# Patient Record
Sex: FEMALE | Race: WHITE | Employment: FULL TIME | URBAN - METROPOLITAN AREA
[De-identification: names, ages, dates, MRNs, and addresses within clinical notes are randomized per-mention and may not be internally consistent; named-entity substitution may affect disease eponyms.]

---

## 2016-02-09 LAB — HPV ABSTRACT: NORMAL

## 2017-05-12 LAB — TSH SERPL-ACNC: 0.98 UIU/ML (ref 0.3–4.5)

## 2017-06-07 ENCOUNTER — TRANSFERRED RECORDS (OUTPATIENT)
Dept: HEALTH INFORMATION MANAGEMENT | Facility: CLINIC | Age: 32
End: 2017-06-07

## 2017-06-07 LAB
HPV ABSTRACT: NORMAL
PAP-ABSTRACT: NORMAL

## 2018-04-09 ENCOUNTER — OFFICE VISIT (OUTPATIENT)
Dept: FAMILY MEDICINE | Facility: CLINIC | Age: 33
End: 2018-04-09
Payer: COMMERCIAL

## 2018-04-09 VITALS
RESPIRATION RATE: 16 BRPM | HEIGHT: 64 IN | DIASTOLIC BLOOD PRESSURE: 68 MMHG | HEART RATE: 68 BPM | SYSTOLIC BLOOD PRESSURE: 118 MMHG | BODY MASS INDEX: 24.59 KG/M2 | TEMPERATURE: 98.1 F | WEIGHT: 144 LBS | OXYGEN SATURATION: 99 %

## 2018-04-09 DIAGNOSIS — G43.009 MIGRAINE WITHOUT AURA AND WITHOUT STATUS MIGRAINOSUS, NOT INTRACTABLE: ICD-10-CM

## 2018-04-09 DIAGNOSIS — F33.1 MODERATE EPISODE OF RECURRENT MAJOR DEPRESSIVE DISORDER (H): Primary | ICD-10-CM

## 2018-04-09 DIAGNOSIS — F41.9 ANXIETY: ICD-10-CM

## 2018-04-09 DIAGNOSIS — G47.09 OTHER INSOMNIA: ICD-10-CM

## 2018-04-09 DIAGNOSIS — Z12.4 SCREENING FOR MALIGNANT NEOPLASM OF CERVIX: ICD-10-CM

## 2018-04-09 DIAGNOSIS — F90.9 ATTENTION DEFICIT HYPERACTIVITY DISORDER (ADHD), UNSPECIFIED ADHD TYPE: ICD-10-CM

## 2018-04-09 DIAGNOSIS — F19.21 DRUG DEPENDENCE, IN REMISSION (H): ICD-10-CM

## 2018-04-09 PROBLEM — E03.9 ACQUIRED HYPOTHYROIDISM: Status: ACTIVE | Noted: 2018-04-09

## 2018-04-09 PROCEDURE — 99203 OFFICE O/P NEW LOW 30 MIN: CPT | Performed by: FAMILY MEDICINE

## 2018-04-09 RX ORDER — RIZATRIPTAN BENZOATE 10 MG/1
TABLET, ORALLY DISINTEGRATING ORAL
Refills: 6 | COMMUNITY
Start: 2018-03-08 | End: 2019-08-19

## 2018-04-09 RX ORDER — VENLAFAXINE HYDROCHLORIDE 150 MG/1
150 CAPSULE, EXTENDED RELEASE ORAL DAILY
Refills: 3 | COMMUNITY
Start: 2018-03-27 | End: 2018-05-22

## 2018-04-09 RX ORDER — VENLAFAXINE HYDROCHLORIDE 75 MG/1
75 CAPSULE, EXTENDED RELEASE ORAL DAILY
Qty: 30 CAPSULE | Refills: 0 | Status: SHIPPED | OUTPATIENT
Start: 2018-04-09 | End: 2018-05-22

## 2018-04-09 RX ORDER — QUETIAPINE FUMARATE 25 MG/1
25-50 TABLET, FILM COATED ORAL
Qty: 45 TABLET | Refills: 0 | Status: SHIPPED | OUTPATIENT
Start: 2018-04-09 | End: 2018-05-13

## 2018-04-09 RX ORDER — ONDANSETRON 4 MG/1
TABLET, ORALLY DISINTEGRATING ORAL
Refills: 1 | COMMUNITY
Start: 2018-03-08 | End: 2019-10-30

## 2018-04-09 RX ORDER — BUPROPION HYDROCHLORIDE 150 MG/1
150 TABLET ORAL EVERY MORNING
Qty: 30 TABLET | Refills: 1 | Status: SHIPPED | OUTPATIENT
Start: 2018-04-09 | End: 2018-05-22

## 2018-04-09 ASSESSMENT — PATIENT HEALTH QUESTIONNAIRE - PHQ9: 5. POOR APPETITE OR OVEREATING: NEARLY EVERY DAY

## 2018-04-09 ASSESSMENT — ANXIETY QUESTIONNAIRES
5. BEING SO RESTLESS THAT IT IS HARD TO SIT STILL: SEVERAL DAYS
1. FEELING NERVOUS, ANXIOUS, OR ON EDGE: NEARLY EVERY DAY
7. FEELING AFRAID AS IF SOMETHING AWFUL MIGHT HAPPEN: SEVERAL DAYS
IF YOU CHECKED OFF ANY PROBLEMS ON THIS QUESTIONNAIRE, HOW DIFFICULT HAVE THESE PROBLEMS MADE IT FOR YOU TO DO YOUR WORK, TAKE CARE OF THINGS AT HOME, OR GET ALONG WITH OTHER PEOPLE: EXTREMELY DIFFICULT
3. WORRYING TOO MUCH ABOUT DIFFERENT THINGS: MORE THAN HALF THE DAYS
6. BECOMING EASILY ANNOYED OR IRRITABLE: MORE THAN HALF THE DAYS
GAD7 TOTAL SCORE: 14
2. NOT BEING ABLE TO STOP OR CONTROL WORRYING: MORE THAN HALF THE DAYS

## 2018-04-09 NOTE — MR AVS SNAPSHOT
After Visit Summary   4/9/2018    Inessa Ayala    MRN: [id number]           Patient Information     Date Of Birth          1985        Visit Information        Provider Department      4/9/2018 1:40 PM Maria Guadalupe Thornton MD [facility]        Today's Diagnoses     Screening for malignant neoplasm of cervix    -  1    Migraine without aura and without status migrainosus, not intractable        Moderate episode of recurrent major depressive disorder (H)        Anxiety          Care Instructions    Decrease Effexor: switch between 150 mg and 75 mg every other day. After 1-2 weeks decrease to 75 mg daily. After another 1-2 weeks add wellbutrin    Be aware you may not see mood changes until about 2-3 weeks after decreasing medication.     Start Wellbutrin 150 mg once daily in the morning- after you have been taking Effexor to 75 mg daily for a couple weeks.      Take Seroquel before bed.    Follow up with psychiatry and counseling.     Med check with me in 3-4 weeks.    Initiate supplemental oral Vitamin D, 2000 IUs daily.            Follow-ups after your visit        Additional Services     MENTAL HEALTH REFERRAL  - Adult; Outpatient Treatment; Individual/Couples/Family/Group Therapy/Health Psychology; [place]: [facility] [phone]; We will contact you to schedule the appointment or please call with any questions       All scheduling is subject to the client's specific insurance plan & benefits, provider/location availability, and provider clinical specialities.  Please arrive 15 minutes early for your first appointment and bring your completed paperwork.    Please be aware that coverage of these services is subject to the terms and limitations of your health insurance plan.  Call member services at your health plan with any benefit or coverage questions.                      MENTAL HEALTH REFERRAL  - Adult; Psychiatry and Medication Management;  "Psychiatry; [facility]: Psychiatry Clinic [phone]; We will contact you to schedule the appointment or please call with any questions       All scheduling is subject to the client's specific insurance plan & benefits, provider/location availability, and provider clinical specialities.  Please arrive 15 minutes early for your first appointment and bring your completed paperwork.    Please be aware that coverage of these services is subject to the terms and limitations of your health insurance plan.  Call member services at your health plan with any benefit or coverage questions.                            Who to contact     If you have questions or need follow up information about today's clinic visit or your schedule please contact [facility] directly at [phone].  Normal or non-critical lab and imaging results will be communicated to you by BRAINDIGIThart, letter or phone within 4 business days after the clinic has received the results. If you do not hear from us within 7 days, please contact the clinic through BRAINDIGIThart or phone. If you have a critical or abnormal lab result, we will notify you by phone as soon as possible.  Submit refill requests through [employer] or call your pharmacy and they will forward the refill request to us. Please allow 3 business days for your refill to be completed.          Additional Information About Your Visit        BRAINDIGIThart Information     [employer] lets you send messages to your doctor, view your test results, renew your prescriptions, schedule appointments and more. To sign up, go to [email or web address] . Click on \"Log in\" on the left side of the screen, which will take you to the Welcome page. Then click on \"Sign up Now\" on the right side of the page.     You will be asked to enter the access code listed below, as well as some personal information. Please follow the directions to create your username and password.     Your access code is: [email or web address]  Expires: " "2018  2:37 PM     Your access code will  in 90 days. If you need help or a new code, please call your [place] clinic or [phone].        Care EveryWhere ID     This is your Care EveryWhere ID. This could be used by other organizations to access your [place] medical records  [id number]        Your Vitals Were     Pulse Temperature Respirations Height Last Period Pulse Oximetry    68 98.1  F (36.7  C) (Oral) 16 1.615 m (5' 3.58\") 03/15/2018 99%    BMI (Body Mass Index)                   25.04 kg/m2            Blood Pressure from Last 3 Encounters:   18 118/68    Weight from Last 3 Encounters:   18 65.3 kg (144 lb)              We Performed the Following     MENTAL HEALTH REFERRAL  - Adult; Outpatient Treatment; Individual/Couples/Family/Group Therapy/Health Psychology; [place]: [facility] [phone]; We will contact you to schedule the appointment or please call with any questions     MENTAL HEALTH REFERRAL  - Adult; Psychiatry and Medication Management; Psychiatry; [facility] [place]: Psychiatry Clinic [phone]; We will contact you to schedule the appointment or please call with any questions          Today's Medication Changes          These changes are accurate as of 18  2:37 PM.  If you have any questions, ask your nurse or doctor.               Start taking these medicines.        Dose/Directions    buPROPion 150 MG 24 hr tablet   Commonly known as:  WELLBUTRIN XL   Used for:  Moderate episode of recurrent major depressive disorder (H), Anxiety   Started by:  Maria Guadalupe Thornton MD        Dose:  150 mg   Take 1 tablet (150 mg) by mouth every morning   Quantity:  30 tablet   Refills:  1       QUEtiapine 25 MG tablet   Commonly known as:  SEROQUEL   Used for:  Moderate episode of recurrent major depressive disorder (H), Anxiety   Started by:  Maria Guadalupe Thornton MD        Dose:  25-50 mg   Take 1-2 tablets (25-50 mg) by mouth nightly as needed   Quantity:  " 45 tablet   Refills:  0         These medicines have changed or have updated prescriptions.        Dose/Directions    * venlafaxine 150 MG 24 hr capsule   Commonly known as:  EFFEXOR-XR   This may have changed:  Another medication with the same name was added. Make sure you understand how and when to take each.   Changed by:  Maria Guadalupe Thornton MD        Dose:  150 mg   Take 150 mg by mouth daily   Refills:  3       * venlafaxine 75 MG 24 hr capsule   Commonly known as:  EFFEXOR-XR   This may have changed:  You were already taking a medication with the same name, and this prescription was added. Make sure you understand how and when to take each.   Used for:  Moderate episode of recurrent major depressive disorder (H), Anxiety   Changed by:  Maria Guadalupe Thornton MD        Dose:  75 mg   Take 1 capsule (75 mg) by mouth daily   Quantity:  30 capsule   Refills:  0       * Notice:  This list has 2 medication(s) that are the same as other medications prescribed for you. Read the directions carefully, and ask your doctor or other care provider to review them with you.         Where to get your medicines      These medications were sent to [id number] - [facility] [address] [facility], [place] AT [address], [place] [id number]     Phone:  [phone]     buPROPion 150 MG 24 hr tablet    QUEtiapine 25 MG tablet    venlafaxine 75 MG 24 hr capsule                Primary Care Provider Office Phone # Fax #    Maria Guadalupe Thornton [phone] [address] [facility] [address]        Equal Access to Services     [facility]: Hadii gladis martinez hadasho Soomaali, waaxda luqadaha, qaybta kaalmada adeegyashell, osman dawson . So [facility] [phone].    ATENCIÓN: Si habla español, tiene a go disposición servicios gratuitos de asistencia lingüística. Llame al [phone].    We comply with applicable federal  civil rights laws and [place] laws. We do not discriminate on the basis of race, color, national origin, age, disability, sex, sexual orientation, or gender identity.            Thank you!     Thank you for choosing [facility]  for your care. Our goal is always to provide you with excellent care. Hearing back from our patients is one way we can continue to improve our services. Please take a few minutes to complete the written survey that you may receive in the mail after your visit with us. Thank you!             Your Updated Medication List - Protect others around you: Learn how to safely use, store and throw away your medicines at www.disposemymeds.org.          This list is accurate as of 4/9/18  2:37 PM.  Always use your most recent med list.                   Brand Name Dispense Instructions for use Diagnosis    ADDERALL 10 MG per tablet   Generic drug:  amphetamine-dextroamphetamine      Take 10 mg by mouth 2 times daily. PRN        buPROPion 150 MG 24 hr tablet    WELLBUTRIN XL    30 tablet    Take 1 tablet (150 mg) by mouth every morning    Moderate episode of recurrent major depressive disorder (H), Anxiety       Multiple vitamin Tabs      Take 1 tablet by mouth daily.        ondansetron 4 MG ODT tab    ZOFRAN-ODT     TAKE 1-2 TABLETS BY MOUTH UP TO TWICE A DAY AS NEEDED FOR NAUSEA RELATED TO MIGRAINE. MAX 9 TABS/MO        QUEtiapine 25 MG tablet    SEROQUEL    45 tablet    Take 1-2 tablets (25-50 mg) by mouth nightly as needed    Moderate episode of recurrent major depressive disorder (H), Anxiety       rizatriptan 10 MG ODT tab    MAXALT-MLT     TAKE 1 TABLET AT ONSET OF TYPICAL MIGRAINE. MAY REPEAT IN 1-2 HRS. MAX 2 TABS/DAY, 9 TABS/MONTH        SYNTHROID 50 MCG tablet   Generic drug:  levothyroxine      Take 50 mcg by mouth daily. 1 tablet daily        * venlafaxine 150 MG 24 hr capsule    EFFEXOR-XR     Take 150 mg by mouth daily        * venlafaxine 75 MG 24 hr capsule    EFFEXOR-XR     30 capsule    Take 1 capsule (75 mg) by mouth daily    Moderate episode of recurrent major depressive disorder (H), Anxiety       XANAX 0.25 MG tablet   Generic drug:  ALPRAZolam      Take 0.25 mg by mouth as needed.        * Notice:  This list has 2 medication(s) that are the same as other medications prescribed for you. Read the directions carefully, and ask your doctor or other care provider to review them with you.

## 2018-04-09 NOTE — PATIENT INSTRUCTIONS
Decrease Effexor: switch between 150 mg and 75 mg every other day. After 1-2 weeks decrease to 75 mg daily. After another 1-2 weeks add wellbutrin    Be aware you may not see mood changes until about 2-3 weeks after decreasing medication.     Start Wellbutrin 150 mg once daily in the morning- after you have been taking Effexor to 75 mg daily for a couple weeks.      Take Seroquel before bed.    Follow up with psychiatry and counseling.     Med check with me in 3-4 weeks.    Initiate supplemental oral Vitamin D, 2000 IUs daily.

## 2018-04-09 NOTE — PROGRESS NOTES
"  SUBJECTIVE:   Inessa Ayala is a 32 year old female who presents to clinic today for the following health issues:    New Patient/Transfer of Care from [facility]. She has also been seen at  urgent care in the past. Patient is here today to establish care, looking for a PCP. Patient would like to discuss Adderall, haven't been on it for years.     Headaches: Pt has hx of migraines- ongoing for the past 10 years. She recently followed with neuro ([facility] [place]) for this and recent med update. She has tried PT, chiropractor, and acupuncture. Also has had MRI and x-rays completed in the past. Suspected migraines are due to hx of back pain.     Mood: When she originally started Effexor she thought it had helped. After increasing the dose she felt flat/zombie like. She is hoping to d/c this med.  Other medications she has tried in the past are zoloft (made her feel sick) and citalopram (made her feel suicidal). Pt has cycles of feeling up and down. For about a week she feels very good/happy, she takes risks and is very goal oriented but when she is not feeling this way she is very depressed and cries \"all the time.\" During her good weeks she feels like she spends excessive amounts of money, books vacations, and start new projects. During these weeks she doesn't talk faster and sleep is unchanged.   -She struggles with pulling out her hair to the point she has needed to get extensions for a period of time. Pt also mentions she binge eats and had a short phase of bulimia (about 2 months) when she was studying abroad in college.  - pt feels she has not slept more than 3 hours per night in months. She is taking \"Amando\" OTC gummy vitamins- has melatonin in it. Pt wakes frequently in the middle of the night but is able to fall asleep within 5-10 minutes. She later reports no sleep induction insomnia.  Anxiety worsens at night and this is usually the only time she needs to use xanax. She has " used 4 xanax tablets in the last 2-3 months. She has tried Unisom for sleep in the past, but has not tried any other prescription medications.  -Anxiety is specifically the worst on Sunday nights, prior to returning to work. She is unsure why because she loves her job- works as a . Pt mentions concentration has been a problem- she had taken adderall in her 20's during grad school to help with ADHD sx's.  -has followed with counseling via [facility] in [place]. Pt is unsure if it was helpful. Has not followed with psychiatry in the past.  -Hx of drug use for short time in her teens. She has not used any drugs since this time.       Thyroid: has family hx of thyroid disease. She has been taking levothyroxine for about 10 years and says there have been no changes. Pt feels she has many thyroid sx's but they are likely tied into mood rather than thyroid.      Problem list and histories reviewed & adjusted, as indicated.  Additional history: as documented    There is no problem list on file for this patient.    History reviewed. No pertinent surgical history.    Social History   Substance Use Topics     Smoking status: Never Smoker     Smokeless tobacco: Never Used     Alcohol use Yes      Comment: Occasionally     Family History   Problem Relation Age of Onset     HEART DISEASE Father      Heart Attack     DIABETES Maternal Grandfather      CANCER Maternal Uncle          Current Outpatient Prescriptions   Medication Sig Dispense Refill     venlafaxine (EFFEXOR-XR) 150 MG 24 hr capsule Take 150 mg by mouth daily  3     ondansetron (ZOFRAN-ODT) 4 MG ODT tab TAKE 1-2 TABLETS BY MOUTH UP TO TWICE A DAY AS NEEDED FOR NAUSEA RELATED TO MIGRAINE. MAX 9 TABS/MO  1     rizatriptan (MAXALT-MLT) 10 MG ODT tab TAKE 1 TABLET AT ONSET OF TYPICAL MIGRAINE. MAY REPEAT IN 1-2 HRS. MAX 2 TABS/DAY, 9 TABS/MONTH  6     alprazolam (XANAX) 0.25 MG tablet Take 0.25 mg by mouth as needed.       Multiple Vitamin  "TABS Take 1 tablet by mouth daily.       levothyroxine (SYNTHROID) 50 MCG tablet Take 50 mcg by mouth daily. 1 tablet daily       amphetamine-dextroamphetamine (ADDERALL) 10 MG tablet Take 10 mg by mouth 2 times daily. PRN        Allergies   Allergen Reactions     Sudafed [Fd&C Red #40-Fd&C Yellow #6-Pse]      Zoloft [Serotonin Reuptake Inhibitors]      Other reaction(s): Other, see comments  Pt states has opposite affects       Reviewed and updated as needed this visit by clinical staff  Tobacco  Allergies  Meds  Med Hx  Surg Hx  Fam Hx  Soc Hx      Reviewed and updated as needed this visit by Provider Tobacco  Allergies  Meds  Med Hx  Surg Hx  Fam Hx  Soc Hx          ROS:  Constitutional, HEENT, cardiovascular, pulmonary, gi and gu systems are negative, except as otherwise noted.    This document serves as a record of the services and decisions personally performed and made by Maria Guadalupe Thornton MD. It was created on her behalf by Nury Nash, a trained medical scribe. The creation of this document is based the provider's statements to the medical scribe.  Nury Nash April 9, 2018 2:03 PM      OBJECTIVE:     /68 (BP Location: Right arm, Patient Position: Sitting, Cuff Size: Adult Regular)  Pulse 68  Temp 98.1  F (36.7  C) (Oral)  Resp 16  Ht 1.615 m (5' 3.58\")  Wt 65.3 kg (144 lb)  LMP 03/15/2018  SpO2 99%  BMI 25.04 kg/m2  Body mass index is 25.04 kg/(m^2).  GENERAL: healthy, alert and no distress, overweight  SKIN: no suspicious lesions or rashes to visible skin  PSYCH: mentation appears normal, affect normal/bright    Diagnostic Test Results:  No results found for this or any previous visit (from the past 24 hour(s)).    ASSESSMENT/PLAN:     1. Moderate episode of recurrent major depressive disorder (H)  2. Anxiety  3. ADHD  4. R/o bipolar d/o  5. Hx drug abuse in remission.  6. insomnia  Mood is not well controlled.  ?bipolar disorder given her history of cycles of mood " and poor response to anti-depressants, drug use in the past. Pt is to f/u with psychiatry and counseling. Will trial weaning off Effexor and starting Wellbutrin. See pt instructions for directions on how to do this. Also advised starting 2000 IU's vitamin D daily and taking Seroquel before bed for sleep.    Med check in 3-4 weeks. She will f/u for cpe  - venlafaxine (EFFEXOR-XR) 75 MG 24 hr capsule; Take 1 capsule (75 mg) by mouth daily  Dispense: 30 capsule; Refill: 0  - MENTAL HEALTH REFERRAL  - Adult; Outpatient Treatment; Individual/Couples/Family/Group Therapy/Health Psychology; [facility]: [facility] [phone]; We will contact you to schedule the appointment or please call with any questions  - MENTAL HEALTH REFERRAL  - Adult; Psychiatry and Medication Management; Psychiatry; [facility]: Psychiatry Clinic [phone]; We will contact you to schedule the appointment or please call with any questions  - QUEtiapine (SEROQUEL) 25 MG tablet; Take 1-2 tablets (25-50 mg) by mouth nightly as needed  Dispense: 45 tablet; Refill: 0  - buPROPion (WELLBUTRIN XL) 150 MG 24 hr tablet; Take 1 tablet (150 mg) by mouth every morning  Dispense: 30 tablet; Refill: 1      3. Migraine without aura and without status migrainosus, not intractable  following with neurology.     4. Screening for malignant neoplasm of cervix  pt is to schedule annual physical with me      Patient Instructions   Decrease Effexor: switch between 150 mg and 75 mg every other day. After 1-2 weeks decrease to 75 mg daily. After another 1-2 weeks add wellbutrin    Be aware you may not see mood changes until about 2-3 weeks after decreasing medication.     Start Wellbutrin 150 mg once daily in the morning- after you have been taking Effexor to 75 mg daily for a couple weeks.      Take Seroquel before bed.    Follow up with psychiatry and counseling.     Med check with me in 3-4 weeks.    Initiate supplemental oral Vitamin D, 2000 IUs  daily.        Length of visit was 36 minutes with more than 50 percent of that time used for discussing medical concerns and education    The information in this document, created by the medical scribe for me, accurately reflects the services I personally performed and the decisions made by me. I have reviewed and approved this document for accuracy.   MD Maria Guadalupe Gusman MD  [facility]

## 2018-04-10 PROBLEM — F90.9 ATTENTION DEFICIT HYPERACTIVITY DISORDER (ADHD), UNSPECIFIED ADHD TYPE: Status: ACTIVE | Noted: 2018-04-10

## 2018-04-10 ASSESSMENT — ANXIETY QUESTIONNAIRES: GAD7 TOTAL SCORE: 14

## 2018-04-10 ASSESSMENT — PATIENT HEALTH QUESTIONNAIRE - PHQ9: SUM OF ALL RESPONSES TO PHQ QUESTIONS 1-9: 17

## 2018-05-13 ENCOUNTER — TELEPHONE (OUTPATIENT)
Dept: FAMILY MEDICINE | Facility: CLINIC | Age: 33
End: 2018-05-13

## 2018-05-13 DIAGNOSIS — F41.9 ANXIETY: ICD-10-CM

## 2018-05-13 DIAGNOSIS — F33.1 MODERATE EPISODE OF RECURRENT MAJOR DEPRESSIVE DISORDER (H): ICD-10-CM

## 2018-05-13 NOTE — LETTER
[address]  [address]  [phone]    May 21, 2018      Inessa Ayala  [id number] [address] [address]      Dear Inessa,    We have refilled your Seroquel for one month.   We will need to see you for an office visit before any additional refills can be given.  Please call [phone] to schedule this appointment.      Thank you,    [facility]

## 2018-05-14 NOTE — TELEPHONE ENCOUNTER
"Requested Prescriptions   Pending Prescriptions Disp Refills     QUEtiapine (SEROQUEL) 25 MG tablet [Pharmacy Med Name: QUETIAPINE FUMARATE 25 MG TAB] 45 tablet 0     Sig: TAKE 1-2 TABLETS BY MOUTH NIGHTLY AS NEEDED    Antipsychotic Medications Failed    5/13/2018  4:16 PM       Failed - Lipid panel on file within the past 12 months    No lab results found.           Failed - CBC on file in past 12 months    No lab results found.         Failed - A1c or Glucose on file in past 12 months    No lab results found.    Please review patients last 3 weights. If a weight gain of >10 lbs exists, you may refill the prescription once after instructing the patient to schedule an appointment within the next 30 days.    Wt Readings from Last 3 Encounters:   04/09/18 65.3 kg (144 lb)            Passed - Blood pressure under 140/90 in past 12 months    BP Readings from Last 3 Encounters:   04/09/18 118/68                Passed - Patient is 12 years of age or older       Passed - Heart Rate on file within past 12 months    Pulse Readings from Last 3 Encounters:   04/09/18 68              Passed - Patient is not pregnant       Passed - No positve pregnancy test on file in past 12 months       Passed - Recent (6 mo) or future (30 days) visit within the authorizing provider's specialty    Patient had office visit in the last 6 months or has a visit in the next 30 days with authorizing provider or within the authorizing provider's specialty.  See \"Patient Info\" tab in inbasket, or \"Choose Columns\" in Meds & Orders section of the refill encounter.            QUEtiapine (SEROQUEL) 25 MG tablet  Last Written Prescription Date:  4/9/18  Last Fill Quantity: 45,  # refills: 0   Last office visit: 4/9/2018 with prescribing provider:  Dr. Thornton   Future Office Visit:   Next 5 appointments (look out 90 days)     Jun 25, 2018  1:00 PM CDT   Return Visit with Nyasia Gómez LP   [facility] ([facility])    " [address] [facility] [id number] [phone]

## 2018-05-15 NOTE — TELEPHONE ENCOUNTER
Routing refill request to provider for review/approval because:  Antipsychotic Medications Failed      5/13/2018  4:16 PM         Failed - Lipid panel on file within the past 12 months     No lab results found.            Failed - CBC on file in past 12 months     No lab results found.         Failed - A1c or Glucose on file in past 12 months     No lab results found.

## 2018-05-16 RX ORDER — QUETIAPINE FUMARATE 25 MG/1
25-50 TABLET, FILM COATED ORAL
Qty: 45 TABLET | Refills: 0 | Status: SHIPPED | OUTPATIENT
Start: 2018-05-16 | End: 2018-08-14

## 2018-05-16 NOTE — TELEPHONE ENCOUNTER
This writer attempted to contact pt on 05/16/18      Reason for call appt and unable to leave message.      If patient calls back:   Schedule Office Visit appointment at her convenience with PCP, document that pt called and close encounter         Kaylin Grover CMA

## 2018-05-17 NOTE — TELEPHONE ENCOUNTER
This writer attempted to contact pt on 05/17/18      Reason for call schedule appt and left message.      If patient calls back:   Schedule Office Visit appointment within 1 month with PCP, document that pt called and close encounter         Joie Cervantes MA;

## 2018-05-22 ENCOUNTER — TELEPHONE (OUTPATIENT)
Dept: FAMILY MEDICINE | Facility: CLINIC | Age: 33
End: 2018-05-22

## 2018-05-22 ENCOUNTER — OFFICE VISIT (OUTPATIENT)
Dept: FAMILY MEDICINE | Facility: CLINIC | Age: 33
End: 2018-05-22
Payer: COMMERCIAL

## 2018-05-22 VITALS
SYSTOLIC BLOOD PRESSURE: 100 MMHG | DIASTOLIC BLOOD PRESSURE: 68 MMHG | TEMPERATURE: 97.9 F | OXYGEN SATURATION: 100 % | RESPIRATION RATE: 18 BRPM | WEIGHT: 147.1 LBS | HEIGHT: 63 IN | HEART RATE: 70 BPM | BODY MASS INDEX: 26.06 KG/M2

## 2018-05-22 DIAGNOSIS — F33.1 MODERATE EPISODE OF RECURRENT MAJOR DEPRESSIVE DISORDER (H): Primary | ICD-10-CM

## 2018-05-22 PROCEDURE — 99213 OFFICE O/P EST LOW 20 MIN: CPT | Performed by: NURSE PRACTITIONER

## 2018-05-22 RX ORDER — BUPROPION HYDROCHLORIDE 75 MG/1
75 TABLET ORAL 2 TIMES DAILY
Qty: 60 TABLET | Refills: 0 | Status: SHIPPED | OUTPATIENT
Start: 2018-05-22 | End: 2018-06-20

## 2018-05-22 ASSESSMENT — PAIN SCALES - GENERAL: PAINLEVEL: NO PAIN (0)

## 2018-05-22 NOTE — TELEPHONE ENCOUNTER
Patient calls to report that she quit taking Effexor on Saturday and started taking Wellbutrin on Sunday. She reports feeling really sick like she has the flu. She reports that she has experienced following symptoms: dizziness, nausea, decreased appetite and she reports that she is having difficult time drinking water as she feels like she is going to vomit. This RN told her that she should be evaluated by provider in clinic. Assisted her in scheduling appointment in next hour. Asked her if she felt like she was safe to drive and she stated that she felt safe enough to drive.   Candis Justice RN

## 2018-05-22 NOTE — MR AVS SNAPSHOT
"              After Visit Summary   5/22/2018    Inessa Ayala    MRN: [id number]           Patient Information     Date Of Birth          1985        Visit Information        Provider Department      5/22/2018 10:20 AM Cadence Huerta NP [facility]        Today's Diagnoses     Moderate episode of recurrent major depressive disorder (H)    -  1       Follow-ups after your visit        Your next 10 appointments already scheduled     Jun 04, 2018  1:00 PM CDT   (Arrive by 12:30 PM)   New Visit with Nyasia Gómez LP   [facility] ([facility])    [address] [place] [id number] [phone]            Jun 25, 2018  1:00 PM CDT   Return Visit with Nyasia Gómez LP   [facility] ([facility])    [address] [place] [id number] [phone]              Who to contact     If you have questions or need follow up information about today's clinic visit or your schedule please contact [facility] directly at [phone].  Normal or non-critical lab and imaging results will be communicated to you by MyChart, letter or phone within 4 business days after the clinic has received the results. If you do not hear from us within 7 days, please contact the clinic through [employer] or phone. If you have a critical or abnormal lab result, we will notify you by phone as soon as possible.  Submit refill requests through [employer] or call your pharmacy and they will forward the refill request to us. Please allow 3 business days for your refill to be completed.          Additional Information About Your Visit        MyChart Information     [employer] lets you send messages to your doctor, view your test results, renew your prescriptions, schedule appointments and more. To sign up, go to [email or web address] . Click on \"Log in\" on the left side of the screen, which will take you to the Welcome page. " "Then click on \"Sign up Now\" on the right side of the page.     You will be asked to enter the access code listed below, as well as some personal information. Please follow the directions to create your username and password.     Your access code is: [email or web address]  Expires: 2018  2:37 PM     Your access code will  in 90 days. If you need help or a new code, please call your [place] clinic or [phone].        Care EveryWhere ID     This is your Care EveryWhere ID. This could be used by other organizations to access your [place] medical records  [id number]        Your Vitals Were     Pulse Temperature Respirations Height Last Period Pulse Oximetry    70 97.9  F (36.6  C) (Oral) 18 1.6 m (5' 3\") 05/15/2018 100%    BMI (Body Mass Index)                   26.06 kg/m2            Blood Pressure from Last 3 Encounters:   18 100/68   18 118/68    Weight from Last 3 Encounters:   18 66.7 kg (147 lb 1.6 oz)   18 65.3 kg (144 lb)              Today, you had the following     No orders found for display         Today's Medication Changes          These changes are accurate as of 18 10:44 AM.  If you have any questions, ask your nurse or doctor.               Start taking these medicines.        Dose/Directions    buPROPion 75 MG tablet   Commonly known as:  WELLBUTRIN   Used for:  Moderate episode of recurrent major depressive disorder (H)   Replaces:  buPROPion 150 MG 24 hr tablet   Started by:  Cadence Huerta NP        Dose:  75 mg   Take 1 tablet (75 mg) by mouth 2 times daily   Quantity:  60 tablet   Refills:  0         Stop taking these medicines if you haven't already. Please contact your care team if you have questions.     buPROPion 150 MG 24 hr tablet   Commonly known as:  WELLBUTRIN XL   Replaced by:  buPROPion 75 MG tablet   Stopped by:  Cadence Huerta NP                Where to get your medicines      These medications were sent to [facility]/pharmacy #[id number] [address] [place] [address]  [address] [facility] [address]     Phone:  [phone]     buPROPion 75 MG tablet                Primary Care Provider Office Phone # Fax #    Maria Guadalupe Thornton [phone] [address]  [facility] [address]        Equal Access to Services     [facility] : Hadii aad ku hadasho Soomaali, waaxda luqadaha, qaybta kaalmada adeegyada, waxay idiin hayaan adeeg kharash la'jonathann ah. So [facility] [phone].    ATENCIÓN: Si dileep marinelli, tiene a go disposición servicios gratuitos de asistencia lingüística. [facility] [phone].    We comply with applicable federal civil rights laws and [place] laws. We do not discriminate on the basis of race, color, national origin, age, disability, sex, sexual orientation, or gender identity.            Thank you!     Thank you for choosing [facility]  for your care. Our goal is always to provide you with excellent care. Hearing back from our patients is one way we can continue to improve our services. Please take a few minutes to complete the written survey that you may receive in the mail after your visit with us. Thank you!             Your Updated Medication List - Protect others around you: Learn how to safely use, store and throw away your medicines at www.disposemymeds.org.          This list is accurate as of 5/22/18 10:44 AM.  Always use your most recent med list.                   Brand Name Dispense Instructions for use Diagnosis    buPROPion 75 MG tablet    WELLBUTRIN    60 tablet    Take 1 tablet (75 mg) by mouth 2 times daily    Moderate episode of recurrent major depressive disorder (H)       Multiple vitamin Tabs      Take 1 tablet by mouth daily.        ondansetron 4 MG ODT tab    ZOFRAN-ODT     TAKE 1-2 TABLETS BY MOUTH UP TO TWICE A DAY AS NEEDED FOR NAUSEA RELATED TO MIGRAINE. MAX 9 TABS/MO        QUEtiapine 25 MG tablet    SEROquel    45 tablet     Take 1-2 tablets (25-50 mg) by mouth nightly as needed Due for office visit prior to further refill    Moderate episode of recurrent major depressive disorder (H), Anxiety       rizatriptan 10 MG ODT tab    MAXALT-MLT     TAKE 1 TABLET AT ONSET OF TYPICAL MIGRAINE. MAY REPEAT IN 1-2 HRS. MAX 2 TABS/DAY, 9 TABS/MONTH        SYNTHROID 50 MCG tablet   Generic drug:  levothyroxine      Take 50 mcg by mouth daily. 1 tablet daily        XANAX 0.25 MG tablet   Generic drug:  ALPRAZolam      Take 0.25 mg by mouth as needed.

## 2018-05-22 NOTE — PROGRESS NOTES
SUBJECTIVE:   Inessa Ayala is a 32 year old female who presents to clinic today for the following health issues:      Concern - Nausea  Onset: sunday    Description:   Has been weak, fatigued, dizzy, nauseous and loss of appetite.     Intensity: moderate, severe    Progression of Symptoms:  worsening    Accompanying Signs & Symptoms:  no    Previous history of similar problem:   no    Precipitating factors:   Worsened by: started wellbutrin and thinking it may be a side effect. Also went to [facility] this past weekend and was hiking and not sure if it is from elevation change or not    Alleviating factors:  Improved by: nothing    Therapies Tried and outcome: xanax but that has not helped      Has tried zoloft, celexa in the past. Effexor recently weaned off to start bupropion last weekend. On seroquel at night. Thought maybe it was from [place] as she was hiking there the past week, but she still feels awful this week/today.  Very dizzy, has water retention, nausea, feeling hot. Takes bupropion with breakfast, still gets nauseated. Last took the 150 mg tab this morning. Last night her stomach felt really tight and 'off'. She feels slightly improved with that symptom today. Feeling a little constipated.     Has not taken seroquel in a week due to vacation. She falls asleep faster and stays asleep longer but still wakes up frequently when she does take it. Doesn't feels tired, lethargic after taking it.  Last night tried melatonin.     Has a therapy appointment at [facility] soon. Is going to make an appointment with psychiatrist soon. The only available appointment was in June when she couldn't get off from a mandatory work event.       Problem list and histories reviewed & adjusted, as indicated.  Additional history: as documented    Patient Active Problem List   Diagnosis     Anxiety     Acquired hypothyroidism     Migraine without aura and without status migrainosus, not intractable     Moderate episode of  "recurrent major depressive disorder (H)     Other insomnia     Drug dependence, in remission (H)     Attention deficit hyperactivity disorder (ADHD), unspecified ADHD type     S/P LEEP     History reviewed. No pertinent surgical history.    Social History   Substance Use Topics     Smoking status: Never Smoker     Smokeless tobacco: Never Used     Alcohol use Yes      Comment: Occasionally     Family History   Problem Relation Age of Onset     HEART DISEASE Father      Heart Attack     DIABETES Maternal Grandfather      CANCER Maternal Uncle            Reviewed and updated as needed this visit by clinical staff  Tobacco  Allergies  Meds  Problems  Med Hx  Surg Hx  Fam Hx  Soc Hx        Reviewed and updated as needed this visit by Provider  Allergies  Meds  Problems         ROS:  Constitutional, cardiovascular, pulmonary, gi and psych as above, systems are negative, except as otherwise noted.    OBJECTIVE:     /68 (BP Location: Right arm, Patient Position: Sitting, Cuff Size: Adult Regular)  Pulse 70  Temp 97.9  F (36.6  C) (Oral)  Resp 18  Ht 1.6 m (5' 3\")  Wt 66.7 kg (147 lb 1.6 oz)  LMP 05/15/2018  SpO2 100%  BMI 26.06 kg/m2  Body mass index is 26.06 kg/(m^2).  GENERAL: healthy, alert and no distress  RESP: lungs clear to auscultation - no rales, rhonchi or wheezes  CV: regular rate and rhythm, normal S1 S2, no S3 or S4, no murmur, click or rub  ABDOMEN: soft, nontender, no hepatosplenomegaly, no masses and bowel sounds normal  MS: no gross musculoskeletal defects noted, no edema  Psych: normal affect, anxious appearing, denies thoughts of hurting herself    Diagnostic Test Results:  none     ASSESSMENT/PLAN:         1. Moderate episode of recurrent major depressive disorder (H)  Change script to short acting formulation BID. Start tomorrow, 5/23/18. Call by Friday morning and let provider know how symptoms are. May need to change medication all together, although she may not know from " Wednesday to Friday if symptoms improved or not.   - buPROPion (WELLBUTRIN) 75 MG tablet; Take 1 tablet (75 mg) by mouth 2 times daily  Dispense: 60 tablet; Refill: 0    FUTURE APPOINTMENTS:       - Follow-up visit in 1-2 months, sooner if medication not helping    WALTER Nguyen, NP-C  [facility]

## 2018-06-04 ENCOUNTER — OFFICE VISIT (OUTPATIENT)
Dept: PSYCHOLOGY | Facility: CLINIC | Age: 33
End: 2018-06-04
Attending: FAMILY MEDICINE
Payer: COMMERCIAL

## 2018-06-04 DIAGNOSIS — F33.1 MODERATE EPISODE OF RECURRENT MAJOR DEPRESSIVE DISORDER (H): Primary | ICD-10-CM

## 2018-06-04 DIAGNOSIS — F41.9 ANXIETY: ICD-10-CM

## 2018-06-04 PROCEDURE — 90791 PSYCH DIAGNOSTIC EVALUATION: CPT | Performed by: PSYCHOLOGIST

## 2018-06-04 NOTE — MR AVS SNAPSHOT
"                  MRN:[id number]                      After Visit Summary   2018    Inessa Ayala    MRN: [id number]           Visit Information        Provider Department      2018 1:00 PM Dalia Nyasia OLMOS, [facility]      Your next 10 appointments already scheduled     2018  1:00 PM CDT   Return Visit with Nyasia Gómez [facility] ([address] [address] [phone]            2018  3:00 PM CDT   Return Visit with Nyasia Gómez [facility] ([address] [address] [phone]              MyChart Information     [employer] lets you send messages to your doctor, view your test results, renew your prescriptions, schedule appointments and more. To sign up, go to [email or web address] . Click on \"Log in\" on the left side of the screen, which will take you to the Welcome page. Then click on \"Sign up Now\" on the right side of the page.     You will be asked to enter the access code listed below, as well as some personal information. Please follow the directions to create your username and password.     Your access code is: [email or web address]  Expires: 2018  2:37 PM     Your access code will  in 90 days. If you need help or a new code, please call your [place] clinic or [phone].        Care EveryWhere ID     This is your Care EveryWhere ID. This could be used by other organizations to access your [place] medical records  [id number]        Equal Access to Services     MIMI BOJORQUEZ AH: Hadii gladis Marie, wablackda luqadaha, qaybta kaalmada adeconstanceyada, osman forman. So [facility] [phone].    ATENCIÓN: Si habla español, tiene a go disposición servicios gratuitos de asistencia lingüística. Llame al [phone].    We comply with applicable " federal civil rights laws and [place] laws. We do not discriminate on the basis of race, color, national origin, age, disability, sex, sexual orientation, or gender identity.
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
## 2018-06-04 NOTE — Clinical Note
Hello, I will be seeing Nancy for therapy. She present with some Bipolar II symptoms but has not gotten that diagnosis. I have referred her to [facility] for psych testing. Nyasia

## 2018-06-06 ASSESSMENT — ANXIETY QUESTIONNAIRES
7. FEELING AFRAID AS IF SOMETHING AWFUL MIGHT HAPPEN: NOT AT ALL
GAD7 TOTAL SCORE: 10
2. NOT BEING ABLE TO STOP OR CONTROL WORRYING: SEVERAL DAYS
IF YOU CHECKED OFF ANY PROBLEMS ON THIS QUESTIONNAIRE, HOW DIFFICULT HAVE THESE PROBLEMS MADE IT FOR YOU TO DO YOUR WORK, TAKE CARE OF THINGS AT HOME, OR GET ALONG WITH OTHER PEOPLE: VERY DIFFICULT
6. BECOMING EASILY ANNOYED OR IRRITABLE: NEARLY EVERY DAY
5. BEING SO RESTLESS THAT IT IS HARD TO SIT STILL: SEVERAL DAYS
3. WORRYING TOO MUCH ABOUT DIFFERENT THINGS: SEVERAL DAYS
1. FEELING NERVOUS, ANXIOUS, OR ON EDGE: MORE THAN HALF THE DAYS

## 2018-06-06 ASSESSMENT — PATIENT HEALTH QUESTIONNAIRE - PHQ9: 5. POOR APPETITE OR OVEREATING: MORE THAN HALF THE DAYS

## 2018-06-07 ASSESSMENT — ANXIETY QUESTIONNAIRES: GAD7 TOTAL SCORE: 10

## 2018-06-07 ASSESSMENT — PATIENT HEALTH QUESTIONNAIRE - PHQ9: SUM OF ALL RESPONSES TO PHQ QUESTIONS 1-9: 16

## 2018-06-19 ENCOUNTER — DOCUMENTATION ONLY (OUTPATIENT)
Dept: LAB | Facility: CLINIC | Age: 33
End: 2018-06-19

## 2018-06-19 DIAGNOSIS — F33.1 MODERATE EPISODE OF RECURRENT MAJOR DEPRESSIVE DISORDER (H): ICD-10-CM

## 2018-06-19 DIAGNOSIS — F41.9 ANXIETY: ICD-10-CM

## 2018-06-19 DIAGNOSIS — E03.9 ACQUIRED HYPOTHYROIDISM: ICD-10-CM

## 2018-06-19 DIAGNOSIS — Z00.00 ENCOUNTER FOR ROUTINE ADULT HEALTH EXAMINATION WITHOUT ABNORMAL FINDINGS: ICD-10-CM

## 2018-06-19 DIAGNOSIS — Z00.00 ENCOUNTER FOR ROUTINE ADULT HEALTH EXAMINATION WITHOUT ABNORMAL FINDINGS: Primary | ICD-10-CM

## 2018-06-19 LAB
BASOPHILS # BLD AUTO: 0 10E9/L (ref 0–0.2)
BASOPHILS NFR BLD AUTO: 1 %
CHOLEST SERPL-MCNC: 230 MG/DL
DEPRECATED CALCIDIOL+CALCIFEROL SERPL-MC: 49 UG/L (ref 20–75)
DIFFERENTIAL METHOD BLD: ABNORMAL
EOSINOPHIL # BLD AUTO: 0 10E9/L (ref 0–0.7)
EOSINOPHIL NFR BLD AUTO: 0.3 %
ERYTHROCYTE [DISTWIDTH] IN BLOOD BY AUTOMATED COUNT: 13.7 % (ref 10–15)
FERRITIN SERPL-MCNC: 8 NG/ML (ref 12–150)
GLUCOSE SERPL-MCNC: 90 MG/DL (ref 70–99)
HCT VFR BLD AUTO: 33.2 % (ref 35–47)
HDLC SERPL-MCNC: 55 MG/DL
HGB BLD-MCNC: 11 G/DL (ref 11.7–15.7)
LDLC SERPL CALC-MCNC: 158 MG/DL
LYMPHOCYTES # BLD AUTO: 1.2 10E9/L (ref 0.8–5.3)
LYMPHOCYTES NFR BLD AUTO: 30.4 %
MCH RBC QN AUTO: 28.9 PG (ref 26.5–33)
MCHC RBC AUTO-ENTMCNC: 33.1 G/DL (ref 31.5–36.5)
MCV RBC AUTO: 87 FL (ref 78–100)
MONOCYTES # BLD AUTO: 0.4 10E9/L (ref 0–1.3)
MONOCYTES NFR BLD AUTO: 10.2 %
NEUTROPHILS # BLD AUTO: 2.2 10E9/L (ref 1.6–8.3)
NEUTROPHILS NFR BLD AUTO: 58.1 %
NONHDLC SERPL-MCNC: 175 MG/DL
PLATELET # BLD AUTO: 290 10E9/L (ref 150–450)
RBC # BLD AUTO: 3.81 10E12/L (ref 3.8–5.2)
TRIGL SERPL-MCNC: 84 MG/DL
TSH SERPL DL<=0.005 MIU/L-ACNC: 1.9 MU/L (ref 0.4–4)
WBC # BLD AUTO: 3.8 10E9/L (ref 4–11)

## 2018-06-19 PROCEDURE — 84443 ASSAY THYROID STIM HORMONE: CPT | Performed by: FAMILY MEDICINE

## 2018-06-19 PROCEDURE — 82947 ASSAY GLUCOSE BLOOD QUANT: CPT | Performed by: FAMILY MEDICINE

## 2018-06-19 PROCEDURE — 80061 LIPID PANEL: CPT | Performed by: FAMILY MEDICINE

## 2018-06-19 PROCEDURE — 36415 COLL VENOUS BLD VENIPUNCTURE: CPT | Performed by: FAMILY MEDICINE

## 2018-06-19 PROCEDURE — 82728 ASSAY OF FERRITIN: CPT | Performed by: FAMILY MEDICINE

## 2018-06-19 PROCEDURE — 82306 VITAMIN D 25 HYDROXY: CPT | Performed by: FAMILY MEDICINE

## 2018-06-19 PROCEDURE — 85025 COMPLETE CBC W/AUTO DIFF WBC: CPT | Performed by: FAMILY MEDICINE

## 2018-06-19 NOTE — PROGRESS NOTES
Patient came in for FASTING pre visit labs but no orders found. Please place FUTURE orders when possible. Patient mentioned she needed thyroid. She was also wonder about her glucose and if she needs a  Hemoglobin check. She went to try to donate blood but was denied because was not at 12.5. She was at around 11. Thank you, Abigail.

## 2018-06-20 ENCOUNTER — TELEPHONE (OUTPATIENT)
Dept: FAMILY MEDICINE | Facility: CLINIC | Age: 33
End: 2018-06-20

## 2018-06-20 DIAGNOSIS — F33.1 MODERATE EPISODE OF RECURRENT MAJOR DEPRESSIVE DISORDER (H): ICD-10-CM

## 2018-06-20 NOTE — TELEPHONE ENCOUNTER
"Requested Prescriptions   Pending Prescriptions Disp Refills     buPROPion (WELLBUTRIN) 75 MG tablet 60 tablet 0     Sig: Take 1 tablet (75 mg) by mouth 2 times daily    SSRIs Protocol Failed    6/20/2018  2:10 PM       Failed - PHQ-9 score less than 5 in past 6 months    Please review last PHQ-9 score.          Passed - Medication is Bupropion    If the medication is Bupropion (Wellbutrin), and the patient is taking for smoking cessation; OK to refill.         Passed - Patient is age 18 or older       Passed - No active pregnancy on record       Passed - No positive pregnancy test in last 12 months       Passed - Recent (6 mo) or future (30 days) visit within the authorizing provider's specialty    Patient had office visit in the last 6 months or has a visit in the next 30 days with authorizing provider or within the authorizing provider's specialty.  See \"Patient Info\" tab in inbasket, or \"Choose Columns\" in Meds & Orders section of the refill encounter.            buPROPion (WELLBUTRIN) 75 MG tablet  Last Written Prescription Date:  5/22/18  Last Fill Quantity: 60,  # refills: 0   Last office visit: 5/22/2018 with prescribing provider:  Dr. Thornton   Future Office Visit:   Next 5 appointments (look out 90 days)     Jun 25, 2018  2:00 PM CDT   PHYSICAL with Maria Guadalupe Thornton MD   [facility] ([address] [address] [phone]            Jul 16, 2018  3:00 PM CDT   Return Visit with Nyasia Gómez LP   [facility] ([address] [address] [phone]                 PHQ-9 SCORE 4/9/2018 6/6/2018   Total Score 17 16     JOEL-7 SCORE 4/9/2018 6/6/2018   Total Score 14 10         "

## 2018-06-22 RX ORDER — BUPROPION HYDROCHLORIDE 75 MG/1
75 TABLET ORAL 2 TIMES DAILY
Qty: 60 TABLET | Refills: 0 | Status: SHIPPED | OUTPATIENT
Start: 2018-06-22 | End: 2018-07-21

## 2018-06-22 NOTE — TELEPHONE ENCOUNTER
This writer attempted to contact 1 on 06/22/18      Reason for call Rx and left detailed message.      If patient calls back:   Patient contacted by [facility] Team (MA/TC). Inform patient that someone from the team will contact them, document that pt called and route to care team.         Deborah Gallo, Medical Assistant

## 2018-06-22 NOTE — TELEPHONE ENCOUNTER
.Reason for Call:    checking on the medication refill    Detailed comments: dosage has changed, now takes it twice a day -  Pharmacy has made several attempts to contact clinic  Patient is low, about two days worth  Phone Number Patient can be reached at: Home number on file [phone] (home)    Best Time: anytime    Can we leave a detailed message on this number? YES    Call taken on 6/22/2018 at 10:37 AM by Radha Ward

## 2018-06-22 NOTE — TELEPHONE ENCOUNTER
Routing refill request to provider for review/approval because:  Patient was to follow up for recheck. Was only given one month. Notes dose change below. Patient has been seen by psychology.    Marquita Martínez RN, [facility]

## 2018-07-21 ENCOUNTER — TELEPHONE (OUTPATIENT)
Dept: FAMILY MEDICINE | Facility: CLINIC | Age: 33
End: 2018-07-21

## 2018-07-21 DIAGNOSIS — F33.1 MODERATE EPISODE OF RECURRENT MAJOR DEPRESSIVE DISORDER (H): ICD-10-CM

## 2018-07-23 NOTE — TELEPHONE ENCOUNTER
"Requested Prescriptions   Pending Prescriptions Disp Refills     buPROPion (WELLBUTRIN) 75 MG tablet [Pharmacy Med Name: BUPROPION HCL 75 MG TABLET]  Last Written Prescription Date:  6/22/18  Last Fill Quantity: 60 tablet,  # refills: 0   Last office visit: 5/22/2018 with prescribing provider:  Dr. Thornton   Future Office Visit:     60 tablet 0     Sig: TAKE 1 TABLET BY MOUTH TWICE A DAY    SSRIs Protocol Failed    7/21/2018 11:21 AM       Failed - PHQ-9 score less than 5 in past 6 months    Please review last PHQ-9 score.   PHQ-9 SCORE 4/9/2018 6/6/2018   Total Score 17 16     JOEL-7 SCORE 4/9/2018 6/6/2018   Total Score 14 10          Passed - Medication is Bupropion    If the medication is Bupropion (Wellbutrin), and the patient is taking for smoking cessation; OK to refill.         Passed - Patient is age 18 or older       Passed - No active pregnancy on record       Passed - No positive pregnancy test in last 12 months       Passed - Recent (6 mo) or future (30 days) visit within the authorizing provider's specialty    Patient had office visit in the last 6 months or has a visit in the next 30 days with authorizing provider or within the authorizing provider's specialty.  See \"Patient Info\" tab in inbasket, or \"Choose Columns\" in Meds & Orders section of the refill encounter.              "

## 2018-07-24 RX ORDER — BUPROPION HYDROCHLORIDE 75 MG/1
75 TABLET ORAL 2 TIMES DAILY
Qty: 30 TABLET | Refills: 0 | Status: SHIPPED | OUTPATIENT
Start: 2018-07-24 | End: 2018-08-14

## 2018-07-24 NOTE — TELEPHONE ENCOUNTER
Routing refill request to provider for review/approval because:  Constance given x1 and patient did not follow up, please advise    Anneliese Mcbride RN  [facility]

## 2018-07-24 NOTE — TELEPHONE ENCOUNTER
Patient needs clinic f/u for medication refill.   Will give 2 week lisset refill to allow her to get scheduled.   Her appt with me on 6/25/18 was cancelled and not rescheduled.

## 2018-08-03 ENCOUNTER — TELEPHONE (OUTPATIENT)
Dept: FAMILY MEDICINE | Facility: CLINIC | Age: 33
End: 2018-08-03

## 2018-08-13 ENCOUNTER — TELEPHONE (OUTPATIENT)
Dept: FAMILY MEDICINE | Facility: CLINIC | Age: 33
End: 2018-08-13

## 2018-08-13 NOTE — TELEPHONE ENCOUNTER
Panel Management Review      BP Readings from Last 1 Encounters:   05/22/18 100/68    , No results found for: A1C, 8/3/2018  Last Office Visit with this department: 8/3/2018    Fail List measure:     Depression / Dysthymia review    Measure:  Needs PHQ-9 score of 4 or less during index window.  Administer PHQ-9 and if score is 5 or more, send encounter to provider for next steps.    5 - 7 month window range: 8/9-12/9    PHQ-9 SCORE 4/9/2018 6/6/2018   Total Score 17 16       If PHQ-9 recheck is 5 or more, route to provider for next steps.    Patient is due for:  PHQ9      Patient is due/failing the following:   PHQ9    Action needed:   Patient needs to do PHQ9.    Type of outreach:    Sent ANDalyzehart message.    Questions for provider review:    None                                                                                                                                    Kaylin Grover [facility]      Chart routed to Care Team .

## 2018-08-14 ENCOUNTER — OFFICE VISIT (OUTPATIENT)
Dept: FAMILY MEDICINE | Facility: CLINIC | Age: 33
End: 2018-08-14
Payer: COMMERCIAL

## 2018-08-14 VITALS
WEIGHT: 143.5 LBS | OXYGEN SATURATION: 98 % | BODY MASS INDEX: 25.43 KG/M2 | SYSTOLIC BLOOD PRESSURE: 106 MMHG | HEIGHT: 63 IN | TEMPERATURE: 98.2 F | DIASTOLIC BLOOD PRESSURE: 76 MMHG | HEART RATE: 73 BPM | RESPIRATION RATE: 18 BRPM

## 2018-08-14 DIAGNOSIS — Z00.00 ENCOUNTER FOR ROUTINE ADULT HEALTH EXAMINATION WITHOUT ABNORMAL FINDINGS: Primary | ICD-10-CM

## 2018-08-14 DIAGNOSIS — F40.243 FEAR OF FLYING: ICD-10-CM

## 2018-08-14 DIAGNOSIS — E03.9 ACQUIRED HYPOTHYROIDISM: ICD-10-CM

## 2018-08-14 DIAGNOSIS — F41.9 ANXIETY: ICD-10-CM

## 2018-08-14 DIAGNOSIS — Z12.4 SCREENING FOR CERVICAL CANCER: ICD-10-CM

## 2018-08-14 DIAGNOSIS — D64.9 ANEMIA, UNSPECIFIED TYPE: ICD-10-CM

## 2018-08-14 DIAGNOSIS — F33.1 MODERATE EPISODE OF RECURRENT MAJOR DEPRESSIVE DISORDER (H): ICD-10-CM

## 2018-08-14 LAB
ERYTHROCYTE [DISTWIDTH] IN BLOOD BY AUTOMATED COUNT: 15.3 % (ref 10–15)
HCT VFR BLD AUTO: 37.4 % (ref 35–47)
HGB BLD-MCNC: 12.3 G/DL (ref 11.7–15.7)
MCH RBC QN AUTO: 28.5 PG (ref 26.5–33)
MCHC RBC AUTO-ENTMCNC: 32.9 G/DL (ref 31.5–36.5)
MCV RBC AUTO: 87 FL (ref 78–100)
PLATELET # BLD AUTO: 310 10E9/L (ref 150–450)
RBC # BLD AUTO: 4.32 10E12/L (ref 3.8–5.2)
WBC # BLD AUTO: 6.2 10E9/L (ref 4–11)

## 2018-08-14 PROCEDURE — 82607 VITAMIN B-12: CPT | Performed by: NURSE PRACTITIONER

## 2018-08-14 PROCEDURE — 85027 COMPLETE CBC AUTOMATED: CPT | Performed by: NURSE PRACTITIONER

## 2018-08-14 PROCEDURE — 99395 PREV VISIT EST AGE 18-39: CPT | Performed by: NURSE PRACTITIONER

## 2018-08-14 PROCEDURE — 83540 ASSAY OF IRON: CPT | Performed by: NURSE PRACTITIONER

## 2018-08-14 PROCEDURE — 82728 ASSAY OF FERRITIN: CPT | Performed by: NURSE PRACTITIONER

## 2018-08-14 PROCEDURE — 36415 COLL VENOUS BLD VENIPUNCTURE: CPT | Performed by: NURSE PRACTITIONER

## 2018-08-14 PROCEDURE — 83550 IRON BINDING TEST: CPT | Performed by: NURSE PRACTITIONER

## 2018-08-14 RX ORDER — ALPRAZOLAM 0.25 MG
0.25 TABLET ORAL PRN
Qty: 15 TABLET | Refills: 0 | Status: SHIPPED | OUTPATIENT
Start: 2018-08-14 | End: 2019-03-13

## 2018-08-14 RX ORDER — QUETIAPINE FUMARATE 25 MG/1
25-50 TABLET, FILM COATED ORAL
Qty: 90 TABLET | Refills: 0 | Status: SHIPPED | OUTPATIENT
Start: 2018-08-14 | End: 2019-10-30

## 2018-08-14 RX ORDER — BUPROPION HYDROCHLORIDE 75 MG/1
75 TABLET ORAL DAILY
Qty: 90 TABLET | Refills: 1 | Status: SHIPPED | OUTPATIENT
Start: 2018-08-14 | End: 2018-11-02

## 2018-08-14 ASSESSMENT — PATIENT HEALTH QUESTIONNAIRE - PHQ9: 5. POOR APPETITE OR OVEREATING: NEARLY EVERY DAY

## 2018-08-14 ASSESSMENT — ANXIETY QUESTIONNAIRES
5. BEING SO RESTLESS THAT IT IS HARD TO SIT STILL: SEVERAL DAYS
2. NOT BEING ABLE TO STOP OR CONTROL WORRYING: SEVERAL DAYS
3. WORRYING TOO MUCH ABOUT DIFFERENT THINGS: SEVERAL DAYS
GAD7 TOTAL SCORE: 10
6. BECOMING EASILY ANNOYED OR IRRITABLE: MORE THAN HALF THE DAYS
1. FEELING NERVOUS, ANXIOUS, OR ON EDGE: SEVERAL DAYS
IF YOU CHECKED OFF ANY PROBLEMS ON THIS QUESTIONNAIRE, HOW DIFFICULT HAVE THESE PROBLEMS MADE IT FOR YOU TO DO YOUR WORK, TAKE CARE OF THINGS AT HOME, OR GET ALONG WITH OTHER PEOPLE: SOMEWHAT DIFFICULT
7. FEELING AFRAID AS IF SOMETHING AWFUL MIGHT HAPPEN: SEVERAL DAYS

## 2018-08-14 ASSESSMENT — PAIN SCALES - GENERAL: PAINLEVEL: NO PAIN (0)

## 2018-08-14 NOTE — PATIENT INSTRUCTIONS
Return in 6 months for med check or as needed      Preventive Health Recommendations  Female Ages 26 - 39  Yearly exam:   See your health care provider every year in order to    Review health changes.     Discuss preventive care.      Review your medicines if you your doctor has prescribed any.    Until age 30: Get a Pap test every three years (more often if you have had an abnormal result).    After age 30: Talk to your doctor about whether you should have a Pap test every 3 years or have a Pap test with HPV screening every 5 years.   You do not need a Pap test if your uterus was removed (hysterectomy) and you have not had cancer.  You should be tested each year for STDs (sexually transmitted diseases), if you're at risk.   Talk to your provider about how often to have your cholesterol checked.  If you are at risk for diabetes, you should have a diabetes test (fasting glucose).  Shots: Get a flu shot each year. Get a tetanus shot every 10 years.   Nutrition:     Eat at least 5 servings of fruits and vegetables each day.    Eat whole-grain bread, whole-wheat pasta and brown rice instead of white grains and rice.    Get adequate Calcium and Vitamin D.     Lifestyle    Exercise at least 150 minutes a week (30 minutes a day, 5 days of the week). This will help you control your weight and prevent disease.    Limit alcohol to one drink per day.    No smoking.     Wear sunscreen to prevent skin cancer.    See your dentist every six months for an exam and cleaning.

## 2018-08-14 NOTE — MR AVS SNAPSHOT
After Visit Summary   8/14/2018    Inessa Ayala    MRN: [id number]           Patient Information     Date Of Birth          1985        Visit Information        Provider Department      8/14/2018 3:40 PM Cadence Huerta NP [facility]        Today's Diagnoses     Moderate episode of recurrent major depressive disorder (H)    -  1    Encounter for routine adult health examination without abnormal findings        Anxiety        Fear of flying        Screening for cervical cancer          Care Instructions      Preventive Health Recommendations  Female Ages 26 - 39  Yearly exam:   See your health care provider every year in order to    Review health changes.     Discuss preventive care.      Review your medicines if you your doctor has prescribed any.    Until age 30: Get a Pap test every three years (more often if you have had an abnormal result).    After age 30: Talk to your doctor about whether you should have a Pap test every 3 years or have a Pap test with HPV screening every 5 years.   You do not need a Pap test if your uterus was removed (hysterectomy) and you have not had cancer.  You should be tested each year for STDs (sexually transmitted diseases), if you're at risk.   Talk to your provider about how often to have your cholesterol checked.  If you are at risk for diabetes, you should have a diabetes test (fasting glucose).  Shots: Get a flu shot each year. Get a tetanus shot every 10 years.   Nutrition:     Eat at least 5 servings of fruits and vegetables each day.    Eat whole-grain bread, whole-wheat pasta and brown rice instead of white grains and rice.    Get adequate Calcium and Vitamin D.     Lifestyle    Exercise at least 150 minutes a week (30 minutes a day, 5 days of the week). This will help you control your weight and prevent disease.    Limit alcohol to one drink per day.    No smoking.     Wear sunscreen to prevent skin cancer.    See your dentist every  "six months for an exam and cleaning.            Follow-ups after your visit        Who to contact     If you have questions or need follow up information about today's clinic visit or your schedule please contact [facility] directly at [phone].  Normal or non-critical lab and imaging results will be communicated to you by ControlScanhart, letter or phone within 4 business days after the clinic has received the results. If you do not hear from us within 7 days, please contact the clinic through Leapforcet or phone. If you have a critical or abnormal lab result, we will notify you by phone as soon as possible.  Submit refill requests through Sharetivity or call your pharmacy and they will forward the refill request to us. Please allow 3 business days for your refill to be completed.          Additional Information About Your Visit        [id number] Information     Sharetivity gives you secure access to your electronic health record. If you see a primary care provider, you can also send messages to your care team and make appointments. If you have questions, please call your primary care clinic.  If you do not have a primary care provider, please call [phone] and they will assist you.        Care EveryWhere ID     This is your Care EveryWhere ID. This could be used by other organizations to access your [place] medical records  [id number]        Your Vitals Were     Pulse Temperature Respirations Height Last Period Pulse Oximetry    73 98.2  F (36.8  C) (Oral) 18 1.6 m (5' 3\") 08/03/2018 98%    BMI (Body Mass Index)                   25.42 kg/m2            Blood Pressure from Last 3 Encounters:   08/14/18 106/76   05/22/18 100/68   04/09/18 118/68    Weight from Last 3 Encounters:   08/14/18 65.1 kg (143 lb 8 oz)   05/22/18 66.7 kg (147 lb 1.6 oz)   04/09/18 65.3 kg (144 lb)              We Performed the Following     HPV High Risk Types DNA Cervical     PAP imaged thin layer screen          Today's Medication " Changes          These changes are accurate as of 8/14/18  4:15 PM.  If you have any questions, ask your nurse or doctor.               These medicines have changed or have updated prescriptions.        Dose/Directions    buPROPion 75 MG tablet   Commonly known as:  WELLBUTRIN   This may have changed:  when to take this   Used for:  Moderate episode of recurrent major depressive disorder (H)   Changed by:  Cadence Huerta NP        Dose:  75 mg   Take 1 tablet (75 mg) by mouth daily Needs office visit prior to refill (limited supply given today due to this)   Quantity:  90 tablet   Refills:  1            Where to get your medicines      These medications were sent to [facility]/pharmacy #[id number] - [place], [place] - [address] [place] AT [facility]  [address] [facility] [address]     Phone:  [phone]     buPROPion 75 MG tablet    QUEtiapine 25 MG tablet         Some of these will need a paper prescription and others can be bought over the counter.  Ask your nurse if you have questions.     Bring a paper prescription for each of these medications     ALPRAZolam 0.25 MG tablet                Primary Care Provider Office Phone # Fax #    Maria Guadalupe Thornton [phone] [address]  [facility] [address]        Equal Access to Services     MIMI BOJORQUEZ AH: Hadii gladis martinez hadasho Soomaali, waaxda luqadaha, qaybta kaalmada adeegyada, osman forman. So [facility] [phone].    ATENCIÓN: Si habla español, tiene a go disposición servicios gratuitos de asistencia lingüística. Llame al [phone].    We comply with applicable federal civil rights laws and [place] laws. We do not discriminate on the basis of race, color, national origin, age, disability, sex, sexual orientation, or gender identity.            Thank you!     Thank you for choosing [facility]  for your care. Our goal is always to provide you with excellent  care. Hearing back from our patients is one way we can continue to improve our services. Please take a few minutes to complete the written survey that you may receive in the mail after your visit with us. Thank you!             Your Updated Medication List - Protect others around you: Learn how to safely use, store and throw away your medicines at www.disposemymeds.org.          This list is accurate as of 8/14/18  4:15 PM.  Always use your most recent med list.                   Brand Name Dispense Instructions for use Diagnosis    ALPRAZolam 0.25 MG tablet    XANAX    15 tablet    Take 1 tablet (0.25 mg) by mouth as needed    Fear of flying       buPROPion 75 MG tablet    WELLBUTRIN    90 tablet    Take 1 tablet (75 mg) by mouth daily Needs office visit prior to refill (limited supply given today due to this)    Moderate episode of recurrent major depressive disorder (H)       Multiple vitamin Tabs      Take 1 tablet by mouth daily.        ondansetron 4 MG ODT tab    ZOFRAN-ODT     TAKE 1-2 TABLETS BY MOUTH UP TO TWICE A DAY AS NEEDED FOR NAUSEA RELATED TO MIGRAINE. MAX 9 TABS/MO        QUEtiapine 25 MG tablet    SEROquel    90 tablet    Take 1-2 tablets (25-50 mg) by mouth nightly as needed Due for office visit prior to further refill    Moderate episode of recurrent major depressive disorder (H), Anxiety       rizatriptan 10 MG ODT tab    MAXALT-MLT     TAKE 1 TABLET AT ONSET OF TYPICAL MIGRAINE. MAY REPEAT IN 1-2 HRS. MAX 2 TABS/DAY, 9 TABS/MONTH        SYNTHROID 50 MCG tablet   Generic drug:  levothyroxine      Take 50 mcg by mouth daily. 1 tablet daily

## 2018-08-14 NOTE — PROGRESS NOTES
SUBJECTIVE:   CC: Inessa Ayala is an 32 year old woman who presents for preventive health visit.     Healthy Habits:    Do you get at least three servings of calcium containing foods daily (dairy, green leafy vegetables, etc.)? no    Amount of exercise or daily activities, outside of work: 5 day(s) per week    Problems taking medications regularly No    Medication side effects: No    Have you had an eye exam in the past two years? yes    Do you see a dentist twice per year? no    Do you have sleep apnea, excessive snoring or daytime drowsiness?daytime drowsiness    Xanax-takes more when she travels-has flight anxiety, seroquel-taking 25 at night-has a hard time waking up the next day so only takes it when she knows she works from home, for sleep and mood. Takes around 8 pm at night. Helps her relax. Taking wellbutrin 75 mg daily. She did see a therapist-Nyasia Wilson at [place], and got a large bill and will not be returning to her.     Has had 3-4 abnormal pap smears. [facility].     Migraines: improved. Only went to neurology once. maxalt helps a lot    Works at [employer], -high stress job    Anemia: unknown cause. Check labs today        Today's PHQ-2 Score:   PHQ-2 ( 1999 Pfizer) 8/14/2018 4/9/2018   Q1: Little interest or pleasure in doing things 0 2   Q2: Feeling down, depressed or hopeless 0 2   PHQ-2 Score 0 4       Abuse: Current or Past(Physical, Sexual or Emotional)- NA  Do you feel safe in your environment - Yes    Social History   Substance Use Topics     Smoking status: Never Smoker     Smokeless tobacco: Never Used     Alcohol use Yes      Comment: Occasionally     If you drink alcohol do you typically have >3 drinks per day or >7 drinks per week? No                     Reviewed orders with patient.  Reviewed health maintenance and updated orders accordingly - Yes  Labs reviewed in EPIC    Mammogram not appropriate for this patient based on  "age.    Pertinent mammograms are reviewed under the imaging tab.  History of abnormal Pap smear: NO - age 30-65 PAP every 5 years with negative HPV co-testing recommended     Reviewed and updated as needed this visit by clinical staff  Tobacco  Allergies  Meds  Problems  Med Hx  Surg Hx  Fam Hx  Soc Hx          Reviewed and updated as needed this visit by Provider  Allergies  Meds  Problems  Med Hx  Surg Hx  Fam Hx            ROS:  CONSTITUTIONAL: NEGATIVE for fever, chills, change in weight  INTEGUMENTARU/SKIN: NEGATIVE for worrisome rashes, moles or lesions  EYES: NEGATIVE for vision changes or irritation  ENT: NEGATIVE for ear, mouth and throat problems  RESP: NEGATIVE for significant cough or SOB  BREAST: NEGATIVE for masses, tenderness or discharge  CV: NEGATIVE for chest pain, palpitations or peripheral edema  GI: NEGATIVE for nausea, abdominal pain, heartburn, or change in bowel habits  : NEGATIVE for unusual urinary or vaginal symptoms. Periods are regular.  MUSCULOSKELETAL: NEGATIVE for significant arthralgias or myalgia  NEURO: NEGATIVE for weakness, dizziness or paresthesias  PSYCHIATRIC: NEGATIVE for changes in mood or affect    OBJECTIVE:   /76 (BP Location: Right arm, Patient Position: Sitting, Cuff Size: Adult Regular)  Pulse 73  Temp 98.2  F (36.8  C) (Oral)  Resp 18  Ht 1.6 m (5' 3\")  Wt 65.1 kg (143 lb 8 oz)  LMP 08/03/2018  SpO2 98%  BMI 25.42 kg/m2  EXAM:  GENERAL: healthy, alert and no distress  EYES: Eyes grossly normal to inspection, PERRL and conjunctivae and sclerae normal  HENT: ear canals and TM's normal, nose and mouth without ulcers or lesions  NECK: no adenopathy, no asymmetry, masses, or scars and thyroid normal to palpation  RESP: lungs clear to auscultation - no rales, rhonchi or wheezes  BREAST: normal without masses although slightly 'lumpy', tenderness or nipple discharge and no palpable axillary masses or adenopathy  CV: regular rate and rhythm, " normal S1 S2, no S3 or S4, no murmur, click or rub  ABDOMEN: soft, nontender, no hepatosplenomegaly, no masses and bowel sounds normal   (female): normal female external genitalia, normal urethral meatus, vaginal mucosa pink, moist, well rugated, and normal cervix/adnexa/uterus without masses or discharge  MS: no gross musculoskeletal defects noted, no edema  SKIN: no suspicious lesions or rashes  NEURO: Normal strength and tone, mentation intact and speech normal  PSYCH: mentation appears normal, affect normal/bright    Diagnostic Test Results:  Results for orders placed or performed in visit on 08/14/18 (from the past 24 hour(s))   CBC with platelets   Result Value Ref Range    WBC 6.2 4.0 - 11.0 10e9/L    RBC Count 4.32 3.8 - 5.2 10e12/L    Hemoglobin 12.3 11.7 - 15.7 g/dL    Hematocrit 37.4 35.0 - 47.0 %    MCV 87 78 - 100 fl    MCH 28.5 26.5 - 33.0 pg    MCHC 32.9 31.5 - 36.5 g/dL    RDW 15.3 (H) 10.0 - 15.0 %    Platelet Count 310 150 - 450 10e9/L       ASSESSMENT/PLAN:   1. Encounter for routine adult health examination without abnormal findings  Normal    2. Moderate episode of recurrent major depressive disorder (H)  Refilled  - QUEtiapine (SEROQUEL) 25 MG tablet; Take 1-2 tablets (25-50 mg) by mouth nightly as needed Due for office visit prior to further refill  Dispense: 90 tablet; Refill: 0  - buPROPion (WELLBUTRIN) 75 MG tablet; Take 1 tablet (75 mg) by mouth daily Needs office visit prior to refill (limited supply given today due to this)  Dispense: 90 tablet; Refill: 1    3. Anxiety  Refilled  - QUEtiapine (SEROQUEL) 25 MG tablet; Take 1-2 tablets (25-50 mg) by mouth nightly as needed Due for office visit prior to further refill  Dispense: 90 tablet; Refill: 0    4. Fear of flying  Small refill  - ALPRAZolam (XANAX) 0.25 MG tablet; Take 1 tablet (0.25 mg) by mouth as needed  Dispense: 15 tablet; Refill: 0    5. Screening for cervical cancer  Done/screening  - PAP imaged thin layer screen  - HPV High  "Risk Types DNA Cervical      NP did not label specimen. Nurse will call patient, apologize for NP, and have her return Monday if she can and no charge for the visit to repeat the pap smear.     6. Anemia, unspecified type  Now Stable  - CBC with platelets  - Iron and iron binding capacity  - Ferritin  - Vitamin B12    7. Hypothyroidism  Stable, last TSH checked recently    COUNSELING:   Reviewed preventive health counseling, as reflected in patient instructions    BP Readings from Last 1 Encounters:   08/14/18 106/76     Estimated body mass index is 25.42 kg/(m^2) as calculated from the following:    Height as of this encounter: 1.6 m (5' 3\").    Weight as of this encounter: 65.1 kg (143 lb 8 oz).           reports that she has never smoked. She has never used smokeless tobacco.      Counseling Resources:  ATP IV Guidelines  Pooled Cohorts Equation Calculator  Breast Cancer Risk Calculator  FRAX Risk Assessment  ICSI Preventive Guidelines  Dietary Guidelines for Americans, 2010  USDA's MyPlate  ASA Prophylaxis  Lung CA Screening    Return in 6 months for med check or as needed    WALTER Nguyen, NP-C  [facility]  "

## 2018-08-15 ENCOUNTER — TELEPHONE (OUTPATIENT)
Dept: FAMILY MEDICINE | Facility: CLINIC | Age: 33
End: 2018-08-15

## 2018-08-15 LAB
FERRITIN SERPL-MCNC: 10 NG/ML (ref 12–150)
IRON SATN MFR SERPL: 29 % (ref 15–46)
IRON SERPL-MCNC: 131 UG/DL (ref 35–180)
TIBC SERPL-MCNC: 446 UG/DL (ref 240–430)
VIT B12 SERPL-MCNC: 379 PG/ML (ref 193–986)

## 2018-08-15 ASSESSMENT — ANXIETY QUESTIONNAIRES: GAD7 TOTAL SCORE: 10

## 2018-08-15 ASSESSMENT — PATIENT HEALTH QUESTIONNAIRE - PHQ9: SUM OF ALL RESPONSES TO PHQ QUESTIONS 1-9: 9

## 2018-08-15 NOTE — TELEPHONE ENCOUNTER
This writer attempted to contact pt on 08/15/18      Reason for call PAP SMEAR and left message.      If patient calls back:   Please send apologies per Cadence that pt's Pap smear was not labeled and tossed. So sorry for the inconvenience but if pt could return AT NO CHARGE for a repeat pap on Monday or whenever she is free.        Kaylin Grover, [facility]

## 2018-08-15 NOTE — TELEPHONE ENCOUNTER
Patient will be here at 8AM Monday for a walk-in, NO CHARGE pap smear repeat.   FYI per Cadence Huerta.     LXIONG3, MEDICAL ASSISTANT

## 2018-09-05 PROCEDURE — 87624 HPV HI-RISK TYP POOLED RSLT: CPT | Performed by: FAMILY MEDICINE

## 2018-09-06 ENCOUNTER — OFFICE VISIT (OUTPATIENT)
Dept: FAMILY MEDICINE | Facility: CLINIC | Age: 33
End: 2018-09-06
Payer: COMMERCIAL

## 2018-09-06 ENCOUNTER — RESULT FOLLOW UP (OUTPATIENT)
Dept: FAMILY MEDICINE | Facility: CLINIC | Age: 33
End: 2018-09-06

## 2018-09-06 VITALS
WEIGHT: 143 LBS | BODY MASS INDEX: 25.33 KG/M2 | HEART RATE: 71 BPM | OXYGEN SATURATION: 98 % | TEMPERATURE: 97.8 F | DIASTOLIC BLOOD PRESSURE: 68 MMHG | SYSTOLIC BLOOD PRESSURE: 116 MMHG

## 2018-09-06 DIAGNOSIS — Z98.890 S/P LEEP: ICD-10-CM

## 2018-09-06 DIAGNOSIS — Z12.4 CERVICAL CANCER SCREENING: Primary | ICD-10-CM

## 2018-09-06 PROCEDURE — G0145 SCR C/V CYTO,THINLAYER,RESCR: HCPCS | Performed by: FAMILY MEDICINE

## 2018-09-06 PROCEDURE — 99207 ZZC NO CHARGE LOS: CPT | Performed by: FAMILY MEDICINE

## 2018-09-06 PROCEDURE — G0124 SCREEN C/V THIN LAYER BY MD: HCPCS | Performed by: FAMILY MEDICINE

## 2018-09-06 ASSESSMENT — PAIN SCALES - GENERAL: PAINLEVEL: NO PAIN (0)

## 2018-09-06 NOTE — MR AVS SNAPSHOT
After Visit Summary   9/6/2018    Inessa Ayala    MRN: [id number]           Patient Information     Date Of Birth          1985        Visit Information        Provider Department      9/6/2018 7:40 AM Jacqueline Dawson MD [facility]        Today's Diagnoses     Cervical cancer screening    -  1    S/P LEECONSUELO          Care Instructions    PAP repeat collection today.            Follow-ups after your visit        Follow-up notes from your care team     Return in about 1 year (around 9/6/2019) for Physical Exam.      Who to contact     If you have questions or need follow up information about today's clinic visit or your schedule please contact [facility] directly at [phone].  Normal or non-critical lab and imaging results will be communicated to you by MyChart, letter or phone within 4 business days after the clinic has received the results. If you do not hear from us within 7 days, please contact the clinic through Proofpointhart or phone. If you have a critical or abnormal lab result, we will notify you by phone as soon as possible.  Submit refill requests through Harbour Antibodies or call your pharmacy and they will forward the refill request to us. Please allow 3 business days for your refill to be completed.          Additional Information About Your Visit        MyChart Information     Harbour Antibodies gives you secure access to your electronic health record. If you see a primary care provider, you can also send messages to your care team and make appointments. If you have questions, please call your primary care clinic.  If you do not have a primary care provider, please call [phone] and they will assist you.        Care EveryWhere ID     This is your Care EveryWhere ID. This could be used by other organizations to access your [place] medical records  [id number]        Your Vitals Were     Pulse Temperature Last Period Pulse Oximetry Breastfeeding? BMI (Body Mass  Index)    71 97.8  F (36.6  C) (Oral) 08/27/2018 (Approximate) 98% No 25.33 kg/m2       Blood Pressure from Last 3 Encounters:   09/06/18 116/68   08/14/18 106/76   05/22/18 100/68    Weight from Last 3 Encounters:   09/06/18 64.9 kg (143 lb)   08/14/18 65.1 kg (143 lb 8 oz)   05/22/18 66.7 kg (147 lb 1.6 oz)              We Performed the Following     HPV High Risk Types DNA Cervical     Pap imaged thin layer screen with HPV - recommended age 30 - 65 years (select HPV order below)        Primary Care Provider Office Phone # Fax #    Maria Guadalupe Thornton [phone] [address]  [facility] [address]        Equal Access to Services     DIOGO BOJORQUEZ : Hadii gladis martinez hadasho Soomaali, waaxda luqadaha, qaybta kaalmada adeconstanceyashell, osman dawson . So [facility] [phone].    ATENCIÓN: Si habla español, tiene a go disposición servicios gratuitos de asistencia lingüística. Elizabeth al [phone].    We comply with applicable federal civil rights laws and [place] laws. We do not discriminate on the basis of race, color, national origin, age, disability, sex, sexual orientation, or gender identity.            Thank you!     Thank you for choosing [facility]  for your care. Our goal is always to provide you with excellent care. Hearing back from our patients is one way we can continue to improve our services. Please take a few minutes to complete the written survey that you may receive in the mail after your visit with us. Thank you!             Your Updated Medication List - Protect others around you: Learn how to safely use, store and throw away your medicines at www.disposemymeds.org.          This list is accurate as of 9/6/18 11:59 PM.  Always use your most recent med list.                   Brand Name Dispense Instructions for use Diagnosis    ALPRAZolam 0.25 MG tablet    XANAX    15 tablet    Take 1 tablet (0.25 mg) by mouth as needed    Fear of  flying       buPROPion 75 MG tablet    WELLBUTRIN    90 tablet    Take 1 tablet (75 mg) by mouth daily Needs office visit prior to refill (limited supply given today due to this)    Moderate episode of recurrent major depressive disorder (H)       Multiple vitamin Tabs      Take 1 tablet by mouth daily.        ondansetron 4 MG ODT tab    ZOFRAN-ODT     TAKE 1-2 TABLETS BY MOUTH UP TO TWICE A DAY AS NEEDED FOR NAUSEA RELATED TO MIGRAINE. MAX 9 TABS/MO        QUEtiapine 25 MG tablet    SEROquel    90 tablet    Take 1-2 tablets (25-50 mg) by mouth nightly as needed Due for office visit prior to further refill    Moderate episode of recurrent major depressive disorder (H), Anxiety       rizatriptan 10 MG ODT tab    MAXALT-MLT     TAKE 1 TABLET AT ONSET OF TYPICAL MIGRAINE. MAY REPEAT IN 1-2 HRS. MAX 2 TABS/DAY, 9 TABS/MONTH        SYNTHROID 50 MCG tablet   Generic drug:  levothyroxine      Take 50 mcg by mouth daily. 1 tablet daily

## 2018-09-06 NOTE — LETTER
October 23, 2019      Inessa Lucy  [address]  [place] [facility] [address]    Dear ,      At [facility], your health and wellness is our primary concern. That is why we are following up on a colposcopy from 11/6/18. Your provider had recommended that you have a Pap smear and HPV test completed by 11/6/19. Our records do not show that this has been scheduled.    It is important to complete the follow up that your provider has suggested for you to ensure that there are no worsening changes which may, over time, develop into cancer.      Please contact our office at  [phone] to schedule an appointment for a Pap smear and HPV test at your earliest convenience. If you have questions or concerns, please call the clinic and we will be happy to assist you.    If you have completed the tests outside of [facility], please have the results forwarded to our office. We will update the chart for your primary Physician to review before your next annual physical.     Thank you for choosing [facility]!    Sincerely,      Your [facility] Care Team/elsie

## 2018-09-06 NOTE — PROGRESS NOTES
SUBJECTIVE:   Inessa Ayala is a 32 year old female who presents to clinic today for the following health issues:    Patient presents for Pap smear examination.    Patient had pap collection at her recent annual exam.  Sample was unable to be processed.  She returns to clinic now for repeat PAP collection.      /68 (BP Location: Right arm, Patient Position: Chair, Cuff Size: Adult Regular)  Pulse 71  Temp 97.8  F (36.6  C) (Oral)  Wt 64.9 kg (143 lb)  LMP 08/27/2018 (Approximate)  SpO2 98%  Breastfeeding? No  BMI 25.33 kg/m2  EXAM:  Constitutional: healthy, alert and no distress   Abdomen: Abdomen soft, non-tender. BS normal. No masses, organomegaly  : Normal external genitalia without lesions and speculum exam with normal appearing vagina and cervix.  PAP obtained.      1. Cervical cancer screening  2. S/P LEEP  - Pap imaged thin layer screen with HPV - recommended age 30 - 65 years (select HPV order below)  - HPV High Risk Types DNA Cervical      Patient Instructions   PAP repeat collection today.

## 2018-09-12 LAB
COPATH REPORT: ABNORMAL
PAP: ABNORMAL

## 2018-09-13 LAB
FINAL DIAGNOSIS: ABNORMAL
HPV HR 12 DNA CVX QL NAA+PROBE: POSITIVE
HPV16 DNA SPEC QL NAA+PROBE: NEGATIVE
HPV18 DNA SPEC QL NAA+PROBE: POSITIVE
SPECIMEN DESCRIPTION: ABNORMAL
SPECIMEN SOURCE CVX/VAG CYTO: ABNORMAL

## 2018-09-14 NOTE — PROGRESS NOTES
10/30/13   LSIL pap, cannot rule out HSIL  5/20/14   Froid bx: 5 & 7 o'clock ROSELIA 3.   7/22/14   LEEP: ROSELIA 2 with extension to margin. Plan: (Management of Women with biopsy confirmed ROSELIA 2, 3, Plan: cotest at 12 and 24 months. If all are NIL/neg HPV then she may go to  3 yr cotesting x 20 years post LEEP)  2/9/16   NIL pap, + HR HPV (not 16 or 18) in Care Everywhere  2/18/16   Froid ECC: HPV effect.   6/7/17   LSIL pap, + HR HPV (not 16 or 18) Plan: colp  7/13/17   Froid bx at 6 o'clock: ROSELIA 1. Plan: cotest in 1 yr.   9/6/18   LSIL pap, + HR HPV 18 and other (no 16). Plan: colp bef 12/6/18 9/18/18 Pt notified by phone.  11/6/18 Froid bx's 4 o'clock: neg, 11 o'clock: Focal koilocytosis. ECC: Focal squamous atypia suggestive of koilocytosis. Plan: cotest in 1 yr.  10/23/19 My Chart cotest reminder message sent (rl)  10/30/19 Pap: ASCUS, +HR HPV type 18. Plan: colpo/ck/lks  11/13/19 Left msg and sent my chart result  12/10/19 colp. Bx: WNL. ECC: WNL. Plan: cotest 1 year. ([facility])

## 2018-11-02 DIAGNOSIS — E03.9 ACQUIRED HYPOTHYROIDISM: Primary | ICD-10-CM

## 2018-11-02 DIAGNOSIS — F33.1 MODERATE EPISODE OF RECURRENT MAJOR DEPRESSIVE DISORDER (H): ICD-10-CM

## 2018-11-02 RX ORDER — BUPROPION HYDROCHLORIDE 75 MG/1
75 TABLET ORAL DAILY
Qty: 90 TABLET | Refills: 0 | Status: SHIPPED | OUTPATIENT
Start: 2018-11-02 | End: 2018-11-06 | Stop reason: DRUGHIGH

## 2018-11-02 RX ORDER — LEVOTHYROXINE SODIUM 50 UG/1
50 TABLET ORAL DAILY
Qty: 90 TABLET | Refills: 0 | Status: SHIPPED | OUTPATIENT
Start: 2018-11-02 | End: 2018-11-08

## 2018-11-02 NOTE — TELEPHONE ENCOUNTER
Routing refill request to provider for review/approval because:  PHQ9 not at goal    Kiah Nunez RN

## 2018-11-02 NOTE — TELEPHONE ENCOUNTER
Recommend E visit or phone visit in the next month or 2.  Encourage patient to fill out PQH9 online.  WALTER Nguyen, NP-C  [facility]

## 2018-11-02 NOTE — TELEPHONE ENCOUNTER
"Requested Prescriptions   Pending Prescriptions Disp Refills     buPROPion (WELLBUTRIN) 75 MG tablet  Last Written Prescription Date:  8/14/18  Last Fill Quantity: 90 tablet,  # refills: 1   Last office visit: 9/5/2018 with prescribing provider:  Cadence Huerta NP   Future Office Visit:   Next 5 appointments (look out 90 days)     Nov 06, 2018  9:20 AM CST   Colposcopy with Jacqueline Dawson MD, BA PROCEDURE ROOM   [facility]    [address] [place] [id number] [phone]                  90 tablet 1     Sig: Take 1 tablet (75 mg) by mouth daily Needs office visit prior to refill (limited supply given today due to this)    SSRIs Protocol Failed    11/2/2018  8:54 AM       Failed - PHQ-9 score less than 5 in past 6 months    Please review last PHQ-9 score.   PHQ-9 SCORE 4/9/2018 6/6/2018 8/14/2018   Total Score 17 16 9     JOEL-7 SCORE 4/9/2018 6/6/2018 8/14/2018   Total Score 14 10 10                Passed - Medication is Bupropion    If the medication is Bupropion (Wellbutrin), and the patient is taking for smoking cessation; OK to refill.         Passed - Patient is age 18 or older       Passed - No active pregnancy on record       Passed - No positive pregnancy test in last 12 months       Passed - Recent (6 mo) or future (30 days) visit within the authorizing provider's specialty    Patient had office visit in the last 6 months or has a visit in the next 30 days with authorizing provider or within the authorizing provider's specialty.  See \"Patient Info\" tab in inbasket, or \"Choose Columns\" in Meds & Orders section of the refill encounter.                    levothyroxine (SYNTHROID) 50 MCG tablet  Last Written Prescription Date:  N/A  Last Fill Quantity: N/A,  # refills: N/A   Last office visit: 9/5/2018 with Cadence Huerta NP  Future Office Visit:   Future Office visit:    Next 5 appointments (look out 90 days)     Nov 06, 2018  9:20 AM CST " "  Colposcopy with Jacqueline Dawson MD, BA PROCEDURE ROOM   [facility]    [address] [place] [id number]   [phone]                   Routing refill request to provider for review/approval because:  Medication is reported/historical    Next 5 appointments (look out 90 days)     Nov 06, 2018  9:20 AM CST   Colposcopy with Jacqueline Dawson MD, BA PROCEDURE ROOM   [facility] ([facility])    [address] [place] [id number]   [phone]                  30 tablet      Sig: Take 1 tablet (50 mcg) by mouth daily 1 tablet daily    Thyroid Protocol Passed    11/2/2018  8:54 AM       Passed - Patient is 12 years or older       Passed - Recent (12 mo) or future (30 days) visit within the authorizing provider's specialty    Patient had office visit in the last 12 months or has a visit in the next 30 days with authorizing provider or within the authorizing provider's specialty.  See \"Patient Info\" tab in inbasket, or \"Choose Columns\" in Meds & Orders section of the refill encounter.             Passed - Normal TSH on file in past 12 months    Recent Labs   Lab Test  06/19/18   0840   TSH  1.90             Passed - No active pregnancy on record    If patient is pregnant or has had a positive pregnancy test, please check TSH.         Passed - No positive pregnancy test in past 12 months    If patient is pregnant or has had a positive pregnancy test, please check TSH.            "

## 2018-11-06 ENCOUNTER — OFFICE VISIT (OUTPATIENT)
Dept: FAMILY MEDICINE | Facility: CLINIC | Age: 33
End: 2018-11-06
Payer: COMMERCIAL

## 2018-11-06 VITALS
DIASTOLIC BLOOD PRESSURE: 68 MMHG | HEIGHT: 63 IN | TEMPERATURE: 98.2 F | HEART RATE: 64 BPM | WEIGHT: 148.8 LBS | BODY MASS INDEX: 26.36 KG/M2 | RESPIRATION RATE: 16 BRPM | OXYGEN SATURATION: 100 % | SYSTOLIC BLOOD PRESSURE: 116 MMHG

## 2018-11-06 DIAGNOSIS — F41.9 ANXIETY: ICD-10-CM

## 2018-11-06 DIAGNOSIS — R87.612 PAPANICOLAOU SMEAR OF CERVIX WITH LOW GRADE SQUAMOUS INTRAEPITHELIAL LESION (LGSIL): Primary | ICD-10-CM

## 2018-11-06 DIAGNOSIS — Z01.812 PRE-PROCEDURE LAB EXAM: ICD-10-CM

## 2018-11-06 DIAGNOSIS — F33.1 MODERATE EPISODE OF RECURRENT MAJOR DEPRESSIVE DISORDER (H): ICD-10-CM

## 2018-11-06 DIAGNOSIS — H61.21 IMPACTED CERUMEN OF RIGHT EAR: ICD-10-CM

## 2018-11-06 LAB — BETA HCG QUAL IFA URINE: NEGATIVE

## 2018-11-06 PROCEDURE — 69210 REMOVE IMPACTED EAR WAX UNI: CPT | Performed by: FAMILY MEDICINE

## 2018-11-06 PROCEDURE — 57454 BX/CURETT OF CERVIX W/SCOPE: CPT | Performed by: FAMILY MEDICINE

## 2018-11-06 PROCEDURE — 99214 OFFICE O/P EST MOD 30 MIN: CPT | Mod: 25 | Performed by: FAMILY MEDICINE

## 2018-11-06 PROCEDURE — 84703 CHORIONIC GONADOTROPIN ASSAY: CPT | Performed by: FAMILY MEDICINE

## 2018-11-06 RX ORDER — BUPROPION HYDROCHLORIDE 100 MG/1
100 TABLET ORAL DAILY
Qty: 30 TABLET | Refills: 0 | Status: SHIPPED | OUTPATIENT
Start: 2018-11-06 | End: 2019-06-26

## 2018-11-06 RX ORDER — LEVOTHYROXINE SODIUM 25 UG/1
12.5 TABLET ORAL DAILY
Refills: 0 | COMMUNITY
Start: 2018-10-28 | End: 2018-11-08

## 2018-11-06 ASSESSMENT — PATIENT HEALTH QUESTIONNAIRE - PHQ9
5. POOR APPETITE OR OVEREATING: MORE THAN HALF THE DAYS
SUM OF ALL RESPONSES TO PHQ QUESTIONS 1-9: 9

## 2018-11-06 ASSESSMENT — ANXIETY QUESTIONNAIRES
7. FEELING AFRAID AS IF SOMETHING AWFUL MIGHT HAPPEN: SEVERAL DAYS
3. WORRYING TOO MUCH ABOUT DIFFERENT THINGS: SEVERAL DAYS
6. BECOMING EASILY ANNOYED OR IRRITABLE: SEVERAL DAYS
IF YOU CHECKED OFF ANY PROBLEMS ON THIS QUESTIONNAIRE, HOW DIFFICULT HAVE THESE PROBLEMS MADE IT FOR YOU TO DO YOUR WORK, TAKE CARE OF THINGS AT HOME, OR GET ALONG WITH OTHER PEOPLE: SOMEWHAT DIFFICULT
5. BEING SO RESTLESS THAT IT IS HARD TO SIT STILL: NOT AT ALL
1. FEELING NERVOUS, ANXIOUS, OR ON EDGE: MORE THAN HALF THE DAYS
2. NOT BEING ABLE TO STOP OR CONTROL WORRYING: SEVERAL DAYS
GAD7 TOTAL SCORE: 8

## 2018-11-06 ASSESSMENT — PAIN SCALES - GENERAL: PAINLEVEL: NO PAIN (0)

## 2018-11-06 NOTE — PATIENT INSTRUCTIONS
Increase the wellbutrin to 100 mg daily.  If this is well tolerated and symptoms are not completely controlled, the next dose would be the 150 mg daily dose.    Start vitamin supplements you have at home and follow up if fatigue symptoms persist after 30 days.        Irrigation of ear today.  For the ear wax - use cotton ball dipped in mineral oil and place in the external canal for 10 to 20 minutes once per week      Vaginal rest for 1 week.  Results and follow up recommendations will be sent when available.      I will verify the thyroid script to [facility] later today.
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
## 2018-11-06 NOTE — PROGRESS NOTES
SUBJECTIVE:   Inessa Ayala is a 33 year old female who presents to clinic today for the following health issues:      1. Colposcopy  - see procedure note    Depression and Anxiety Follow-Up    Status since last visit: stable - slightly improved    Other associated symptoms: anxiety has been worse - at night    Complicating factors:     Significant life event: No     Current substance abuse: None    PHQ 6/6/2018 8/14/2018 11/6/2018   PHQ-9 Total Score 16 9 9   Q9: Suicide Ideation Not at all Not at all Not at all     JOEL-7 SCORE 6/6/2018 8/14/2018 11/6/2018   Total Score 10 10 8     Poor sleep. Did not tolerated the 150 mg dose of wellbutrin due to side effects of GI upset, stomach pain.  Only taking the 75 mg dose once daily.      Right ear plugged - thinks full of wax.  Wants to get it flushed.      Hypothyroidism -   Issue with refills.  Reviewed chart with patient and care everywhere.  Her prior dose has been 1/2 of a 25 mcg dose per the former records and that is patient given history as well.  A 50 mcg dose was [facility]  Uncertain the reason for this but will communicate with provider who sent script and clarify.  [facility] and ask [facility] not to send the 50 mcg dose to her now.  She has 25 mcg pills at home currently.        Problem list and histories reviewed & adjusted, as indicated.  Additional history: as documented    Patient Active Problem List   Diagnosis     Anxiety     Acquired hypothyroidism     Migraine without aura and without status migrainosus, not intractable     Moderate episode of recurrent major depressive disorder (H)     Other insomnia     Drug dependence, in remission (H)     Attention deficit hyperactivity disorder (ADHD), unspecified ADHD type     S/P LEEP     Past Surgical History:   Procedure Laterality Date     LEEP TX, CERVICAL  07/22/2014       Social History   Substance Use Topics     Smoking status: Never Smoker      Smokeless tobacco: Never Used     Alcohol use Yes      Comment: Occasionally     Family History   Problem Relation Age of Onset     HEART DISEASE Father      Heart Attack     Diabetes Maternal Grandfather      Cancer Maternal Uncle          Current Outpatient Prescriptions   Medication Sig Dispense Refill     ALPRAZolam (XANAX) 0.25 MG tablet Take 1 tablet (0.25 mg) by mouth as needed 15 tablet 0     buPROPion (WELLBUTRIN) 75 MG tablet Take 1 tablet (75 mg) by mouth daily Follow up with provider by e-visit or phone visit 90 tablet 0     levothyroxine (SYNTHROID) 50 MCG tablet Take 1 tablet (50 mcg) by mouth daily 1 tablet daily 90 tablet 0     Multiple Vitamin TABS Take 1 tablet by mouth daily.       ondansetron (ZOFRAN-ODT) 4 MG ODT tab TAKE 1-2 TABLETS BY MOUTH UP TO TWICE A DAY AS NEEDED FOR NAUSEA RELATED TO MIGRAINE. MAX 9 TABS/MO  1     QUEtiapine (SEROQUEL) 25 MG tablet Take 1-2 tablets (25-50 mg) by mouth nightly as needed Due for office visit prior to further refill 90 tablet 0     rizatriptan (MAXALT-MLT) 10 MG ODT tab TAKE 1 TABLET AT ONSET OF TYPICAL MIGRAINE. MAY REPEAT IN 1-2 HRS. MAX 2 TABS/DAY, 9 TABS/MONTH  6     levothyroxine (SYNTHROID/LEVOTHROID) 25 MCG tablet Take 12.5 mcg by mouth daily  0     Allergies   Allergen Reactions     Sudafed [Fd&C Red #40-Fd&C Yellow #6-Pse]      Zoloft [Serotonin Reuptake Inhibitors]      Other reaction(s): Other, see comments  Pt states has opposite affects     Recent Labs   Lab Test  06/19/18   0840 05/12/17   LDL  158*   --    HDL  55   --    TRIG  84   --    TSH  1.90  0.98      BP Readings from Last 3 Encounters:   11/06/18 116/68   09/06/18 116/68   08/14/18 106/76    Wt Readings from Last 3 Encounters:   11/06/18 67.5 kg (148 lb 12.8 oz)   09/06/18 64.9 kg (143 lb)   08/14/18 65.1 kg (143 lb 8 oz)                  Labs reviewed in EPIC    Reviewed and updated as needed this visit by clinical staff  Tobacco  Allergies  Meds  Med Hx  Surg Hx  Fam Hx  Soc  "Hx      Reviewed and updated as needed this visit by Provider  Tobacco  Allergies  Meds  Med Hx  Surg Hx  Fam Hx  Soc Hx        ROS:  Constitutional, HEENT, cardiovascular, pulmonary, GI, , musculoskeletal, neuro, skin, endocrine and psych systems are negative, except as otherwise noted.      OBJECTIVE:     /68 (BP Location: Right arm, Patient Position: Sitting, Cuff Size: Adult Regular)  Pulse 64  Temp 98.2  F (36.8  C) (Oral)  Resp 16  Ht 1.6 m (5' 3\")  Wt 67.5 kg (148 lb 12.8 oz)  LMP 10/28/2018 (Exact Date)  SpO2 100%  BMI 26.36 kg/m2  Body mass index is 26.36 kg/(m^2).  GENERAL: healthy, alert and no distress  HENT: normal cephalic/atraumatic, right ear: Cerumenosis is noted.  Wax is removed by syringing and manual debridement. Instructions for home care to prevent wax buildup are given., left ear: normal: no effusions, no erythema, normal landmarks, nose and mouth without ulcers or lesions, oropharynx clear and oral mucous membranes moist  NECK: no adenopathy, no asymmetry, masses, or scars and thyroid normal to palpation  RESP: lungs clear to auscultation - no rales, rhonchi or wheezes  CV: regular rate and rhythm, normal S1 S2, no S3 or S4, no murmur, click or rub, no peripheral edema and peripheral pulses strong  ABDOMEN: soft, nontender, no hepatosplenomegaly, no masses and bowel sounds normal  MS: no gross musculoskeletal defects noted, no edema  PSYCH: mentation appears normal, affect flat, judgement and insight intact and appearance well groomed    Diagnostic Test Results:  Results for orders placed or performed in visit on 11/06/18   Beta HCG Qual, Urine - [facility] [place] ([id number])   Result Value Ref Range    Beta HCG Qual IFA Urine Negative NEG^Negative          ASSESSMENT/PLAN:           1. Papanicolaou smear of cervix with low grade squamous intraepithelial lesion (LGSIL)  See procedure noted  - COLP CERVIX/UPPER VAGINA W BX CERVIX/ENDOCERV CURETT  - Surgical pathology " exam    2. Pre-procedure lab exam  - Beta HCG Qual, Urine - [facility] [place] ([id number])    3. Moderate episode of recurrent major depressive disorder (H)  4. Anxiety  Adjust dose as noted.  Increase to 150 mg if this dose is tolerated.  - buPROPion (WELLBUTRIN) 100 MG tablet; Take 1 tablet (100 mg) by mouth daily  Dispense: 30 tablet; Refill: 0    5. Impacted cerumen of right ear  - REMOVE IMPACTED CERUMEN    Patient Instructions   Increase the wellbutrin to 100 mg daily.  If this is well tolerated and symptoms are not completely controlled, the next dose would be the 150 mg daily dose.    Start vitamin supplements you have at home and follow up if fatigue symptoms persist after 30 days.        Irrigation of ear today.  For the ear wax - use cotton ball dipped in mineral oil and place in the external canal for 10 to 20 minutes once per week      Vaginal rest for 1 week.  Results and follow up recommendations will be sent when available.      I will verify the thyroid script to [facility] later today.          Jacqueline Dawson MD  [facility]

## 2018-11-06 NOTE — MR AVS SNAPSHOT
After Visit Summary   11/6/2018    Inessa Ayala    MRN: [id number]           Patient Information     Date Of Birth          1985        Visit Information        Provider Department      11/6/2018 9:20 AM Jacqueline Dawson MD; BA PROCEDURE ROOM [facility]        Today's Diagnoses     Pre-procedure lab exam    -  1    Moderate episode of recurrent major depressive disorder (H)        Anxiety        Impacted cerumen of right ear          Care Instructions    Increase the wellbutrin to 100 mg daily.  If this is well tolerated and symptoms are not completely controlled, the next dose would be the 150 mg daily dose.    Start vitamin supplements you have at home and follow up if fatigue symptoms persist after 30 days.        Irrigation of ear today.  For the ear wax - use cotton ball dipped in mineral oil and place in the external canal for 10 to 20 minutes once per week      Vaginal rest for 1 week.  Results and follow up recommendations will be sent when available.      I will verify the thyroid script to [facility] later today.              Follow-ups after your visit        Who to contact     If you have questions or need follow up information about today's clinic visit or your schedule please contact [facility] directly at [phone].  Normal or non-critical lab and imaging results will be communicated to you by MyChart, letter or phone within 4 business days after the clinic has received the results. If you do not hear from us within 7 days, please contact the clinic through "[employer]hart or phone. If you have a critical or abnormal lab result, we will notify you by phone as soon as possible.  Submit refill requests through [employer] or call your pharmacy and they will forward the refill request to us. Please allow 3 business days for your refill to be completed.          Additional Information About Your Visit        MyChart Information     [employer] gives you  "secure access to your electronic health record. If you see a primary care provider, you can also send messages to your care team and make appointments. If you have questions, please call your primary care clinic.  If you do not have a primary care provider, please call [phone] and they will assist you.        Care EveryWhere ID     This is your Care EveryWhere ID. This could be used by other organizations to access your [place] medical records  [id number]        Your Vitals Were     Pulse Temperature Respirations Height Last Period Pulse Oximetry    64 98.2  F (36.8  C) (Oral) 16 5' 3\" (1.6 m) 10/28/2018 (Exact Date) 100%    BMI (Body Mass Index)                   26.36 kg/m2            Blood Pressure from Last 3 Encounters:   11/06/18 116/68   09/06/18 116/68   08/14/18 106/76    Weight from Last 3 Encounters:   11/06/18 148 lb 12.8 oz (67.5 kg)   09/06/18 143 lb (64.9 kg)   08/14/18 143 lb 8 oz (65.1 kg)              We Performed the Following     Beta HCG Qual, Urine - [facility] [place] ([id number])     REMOVE IMPACTED CERUMEN          Today's Medication Changes          These changes are accurate as of 11/6/18 10:20 AM.  If you have any questions, ask your nurse or doctor.               These medicines have changed or have updated prescriptions.        Dose/Directions    buPROPion 100 MG tablet   Commonly known as:  WELLBUTRIN   This may have changed:    - medication strength  - how much to take  - additional instructions   Used for:  Moderate episode of recurrent major depressive disorder (H), Anxiety   Changed by:  Jacqueline Dawson MD        Dose:  100 mg   Take 1 tablet (100 mg) by mouth daily   Quantity:  30 tablet   Refills:  0            Where to get your medicines      These medications were sent to [facility]/pharmacy #[id number] - [place], [place] - [address] [place] [address], [place] [address]     Phone:  [phone]     buPROPion 100 MG tablet       "          Primary Care Provider Office Phone # Fax #    Maria Guadalupe Thornton [phone] [address]  [facility] [address]        Equal Access to Services     MIMI BOJORQUEZ : Hadrica gladis martinez luho Solibia, waaxda luqadaha, qaybta kaalmada adeconstanceyada, osman saldivar eunice forman. So [facility] [phone].    ATENCIÓN: Si habla español, tiene a go disposición servicios gratuitos de asistencia lingüística. Llame al [phone].    We comply with applicable federal civil rights laws and [place] laws. We do not discriminate on the basis of race, color, national origin, age, disability, sex, sexual orientation, or gender identity.            Thank you!     Thank you for choosing [facility]  for your care. Our goal is always to provide you with excellent care. Hearing back from our patients is one way we can continue to improve our services. Please take a few minutes to complete the written survey that you may receive in the mail after your visit with us. Thank you!             Your Updated Medication List - Protect others around you: Learn how to safely use, store and throw away your medicines at www.disposemymeds.org.          This list is accurate as of 11/6/18 10:20 AM.  Always use your most recent med list.                   Brand Name Dispense Instructions for use Diagnosis    ALPRAZolam 0.25 MG tablet    XANAX    15 tablet    Take 1 tablet (0.25 mg) by mouth as needed    Fear of flying       buPROPion 100 MG tablet    WELLBUTRIN    30 tablet    Take 1 tablet (100 mg) by mouth daily    Moderate episode of recurrent major depressive disorder (H), Anxiety       * levothyroxine 25 MCG tablet    SYNTHROID/LEVOTHROID     Take 12.5 mcg by mouth daily        * levothyroxine 50 MCG tablet    SYNTHROID    90 tablet    Take 1 tablet (50 mcg) by mouth daily 1 tablet daily    Acquired hypothyroidism       Multiple vitamin Tabs      Take 1 tablet by mouth daily.         ondansetron 4 MG ODT tab    ZOFRAN-ODT     TAKE 1-2 TABLETS BY MOUTH UP TO TWICE A DAY AS NEEDED FOR NAUSEA RELATED TO MIGRAINE. MAX 9 TABS/MO        QUEtiapine 25 MG tablet    SEROquel    90 tablet    Take 1-2 tablets (25-50 mg) by mouth nightly as needed Due for office visit prior to further refill    Moderate episode of recurrent major depressive disorder (H), Anxiety       rizatriptan 10 MG ODT tab    MAXALT-MLT     TAKE 1 TABLET AT ONSET OF TYPICAL MIGRAINE. MAY REPEAT IN 1-2 HRS. MAX 2 TABS/DAY, 9 TABS/MONTH        * Notice:  This list has 2 medication(s) that are the same as other medications prescribed for you. Read the directions carefully, and ask your doctor or other care provider to review them with you.

## 2018-11-07 ENCOUNTER — TELEPHONE (OUTPATIENT)
Dept: FAMILY MEDICINE | Facility: CLINIC | Age: 33
End: 2018-11-07

## 2018-11-07 DIAGNOSIS — E03.9 ACQUIRED HYPOTHYROIDISM: Primary | ICD-10-CM

## 2018-11-07 PROCEDURE — 88305 TISSUE EXAM BY PATHOLOGIST: CPT | Performed by: FAMILY MEDICINE

## 2018-11-07 ASSESSMENT — ANXIETY QUESTIONNAIRES: GAD7 TOTAL SCORE: 8

## 2018-11-07 NOTE — TELEPHONE ENCOUNTER
Please review her recent refill request of synthroid dated 11/2/18.  See copied notes from my office visit below.  Not sure where the 50 mcg dose request came from - do we have any paperwork on this???    PSK              Issue with refills.  Reviewed chart with patient and care everywhere.  Her prior dose has been 1/2 of a 25 mcg dose per the former records and that is patient given history as well.  A 50 mcg dose was sent in for her [facility].  Uncertain the reason for this but will communicate with provider who sent script and clarify.  [facility] will [facility] and ask them not to send the 50 mcg dose to her now.  She has 25 mcg pills at home currently.

## 2018-11-07 NOTE — TELEPHONE ENCOUNTER
Patient reports she has been on 1/2 of a 25 mcg pill in past.  Review of care everywhere indicates this was dose from [facility] in past - was it a faxed request that was entered or an electronic request?  If faxed form please put copy of my desk.  If it is not available please call [facility] to see if they have previously dispensed and if so what dosage.     Thanks,  PSK

## 2018-11-07 NOTE — TELEPHONE ENCOUNTER
RN reviewed and request came in from pharmacy for that dosing. Also the following information is noted in the medication history. Appears like it was patient reported and marked as taking at the following visits below. No clear information on the original entered date of 6/1/11.    Order History  Historical Medication     Date/Time Action Taken User Additional Information     06/01/11 1603 Historical Med Noted Micky Hitchcock      04/09/18 1352 Taking Flag Checked Deborah Gallo      05/22/18 1023 Taking Flag Checked Kaylin Grover CMA      08/14/18 1546 Taking Flag Checked Kaylin Grover CMA      09/06/18 0748 Taking Flag Checked Nury Eduardo MA      11/02/18 0854 Reorder Cadence Huerta NP To Order: [id number]     11/02/18 1635 Discontinue Cadence Huerta NP Reason: Reorder     Marquita Martínez RN, [facility] Triage

## 2018-11-08 RX ORDER — LEVOTHYROXINE SODIUM 25 UG/1
12.5 TABLET ORAL DAILY
Qty: 45 TABLET | Refills: 1 | Status: SHIPPED | OUTPATIENT
Start: 2018-11-08 | End: 2019-03-29

## 2018-11-08 NOTE — TELEPHONE ENCOUNTER
Called pharmacy - script was already sent out on 11/5/18 - please review      Will Marianela STRONG

## 2018-11-08 NOTE — TELEPHONE ENCOUNTER
Filling the 25 mcg   1/2 pill daily.   Please call [facility] and cancel the previously sent 50 mcg dose.   PSK

## 2018-11-08 NOTE — TELEPHONE ENCOUNTER
Appears the refill request was from 11/2/18. Encounter created by xray tech and it is indicated as an incoming fax.     Team, please:     #1) See if xray still has a copy of the incoming fax showing the refill request for levothyroxine. If yes, then give to Dr. Dawson to review.     OR    #2) If xray does not have the fax anymore, then follow directions below to call [facility] (#[phone]) to see if they have previously sent patient a prescription for Levothyroxine. If they have, please ask them what dose was given to patient. Record findings and route to Dr. Dawson.     Romy Freeman RN

## 2018-11-09 LAB — COPATH REPORT: NORMAL

## 2018-11-12 ENCOUNTER — TELEPHONE (OUTPATIENT)
Dept: FAMILY MEDICINE | Facility: CLINIC | Age: 33
End: 2018-11-12

## 2018-11-12 NOTE — TELEPHONE ENCOUNTER
Reason for Call:  Request for results:    Name of test or procedure: Surgical Pathological Exam    Date of test of procedure: 11/07/18    Location of the test or procedure:  Clinic    OK to leave the result message on voice mail or with a family member? YES    Phone number Patient can be reached at:  Home number on file [phone] (home)    Additional comments: Pt calling for she recently had a Colposcopy and would like a call back to discuss further.    Call taken on 11/12/2018 at 1:19 PM by Billy Cortez

## 2018-11-12 NOTE — TELEPHONE ENCOUNTER
Team please contact patient and let her know that results are not available yet for the 11/7/18 surgical pathology exam. She will be notified when results become available.     Provider please review and advise.    Routing to team and provider.    Candis Justice RN

## 2018-11-13 NOTE — TELEPHONE ENCOUNTER
PC to patient RE:  Results biopsy.  LM that I was sending message over [employer] and that I would recommend repeat PAP in 1 year.  PSK          Your biopsy results do not show any abnormal cells.  I would recommend repeat PAP with HPV testing in 1 year.  Please call or [employer] message me if you have any questions.   PSK

## 2019-02-22 ENCOUNTER — VIRTUAL VISIT (OUTPATIENT)
Dept: FAMILY MEDICINE | Facility: OTHER | Age: 34
End: 2019-02-22

## 2019-02-22 NOTE — PROGRESS NOTES
"Date:   Clinician: Zach Martinez  Clinician NPI: [id number]  Patient: Inessa Ayala  Patient : 1985  Patient Address: [address], [place] [address]  Patient Phone: [phone]  Visit Protocol: URI  Patient Summary:  Inessa is a 33 year old ( : 1985 ) female who initiated a Visit for cold, sinus infection, or influenza. When asked the question \"Please sign me up to receive news, health information and promotions from [employer].\", Inessa responded \"No\".    Inessa states her symptoms started gradually 1 month or more ago. After her symptoms started, they improved and then got worse again.   Her symptoms consist of malaise, facial pain or pressure, a cough, rhinitis, tooth pain, nasal congestion, enlarged lymph nodes, a headache, and ear pain. She is experiencing mild difficulty breathing with activities but can speak normally in full sentences.   Symptom details     Nasal secretions: The color of her mucus is yellow, clear, and white.    Cough: Inessa coughs a few times an hour and her cough is more bothersome at night. Phlegm comes into her throat when she coughs. She does not believe the phlegm causes the cough. The color of the phlegm is yellow and white.     Facial pain or pressure: She has not had the facial pain or pressure for 12 weeks or more. The facial pain or pressure feels worse when bending over or leaning forward.     Headache: Inessa has not had the headache for 12 weeks or more. She states the headache is moderate (4-6 on a 10 point pain scale).     Tooth pain: The tooth pain is not caused by a cavity, recent dental work, or other mouth problems.      Inessa denies having fever, chills, wheezing, myalgias, and sore throat. She also denies having a sinus infection within the past year, taking antibiotic medication for the symptoms, and having recent facial or sinus surgery in the past 60 days.   She has not recently been exposed to someone with " influenza. Inessa has been in close contact with the following high risk individuals: pregnant women.   Weight: 145 lbs   Inessa does not smoke or use smokeless tobacco.   She denies pregnancy and denies breastfeeding. She has menstruated in the past month.   Additional information as reported by the patient (free text): I'm ending week 5 of pressure in my forehead and between eyes. It was much worse 2 weeks ago and I thought I was getting better but the pressure and cough remains. I've tried sudafed and mucenex- they help me through day but not curing what is wrong. Zero energy. Appetite is fine. Sleep is poor. Feel the worse morning upon awaking and before bed.   MEDICATIONS: levothyroxine oral, bupropion HCl oral, ALLERGIES: NKDA  Clinician Response:  Dear Inessa,  Based on the information provided, you have acute bacterial sinusitis, also known as a sinus infection. Sinus infections are caused by bacteria or a virus and symptoms are almost always identical. The difference between the 2 types of infections is timing.  Sinus infections start as viral infections and symptoms improve on their own in about 7 days. If symptoms have not improved after 7 days or have even worsened, a bacterial infection may have developed.  Medication information  I am prescribing:       Fluticasone 50 mcg/actuation nasal spray. Inhale 2 sprays in each nostril 1 time per day; after 1 week, may adjust to 1 - 2 sprays in each nostril 1 time per day. This medication takes several days to start working, so keep taking it even if it doesn't help right away. There are no refills with this prescription.      Amoxicillin 500 mg oral tablet. Take 1 tablet by mouth every 8 hours for 10 days. There are no refills with this prescription.     Antibiotics can cause an allergic reaction even if you have taken them without a problem in the past. If you develop a new rash, swelling, or difficulty breathing, stop the medication and be seen in a  clinic or urgent care immediately.  A yeast infection is a side effect of taking antibiotics in some women. Please use [facility] to get treatment if you have symptoms of a yeast infection.  If you become pregnant during this course of treatment, stop taking the medication and contact your primary care provider.  Unless you are allergic to the over-the-counter medication(s) below, I recommend using:     Saline nasal spray ([place] or store brand). Use 1-2 sprays in each nostril 3 times a day as needed for congestion.   Over-the-counter medications do not require a prescription. Ask the pharmacist if you have any questions.  Self care  The following tips will keep you as comfortable as possible while you recover:     Rest    Drink plenty of water and other liquids    Take a hot shower to loosen congestion    Take a spoonful of honey to reduce your cough     When to seek care  Please be seen in a clinic or urgent care if any of the following occur:     Symptoms do not start to improve after 3 days of treatment    New symptoms develop, or symptoms become worse      Diagnosis: Acute bacterial sinusitis  Diagnosis ICD: J01.90  Prescription: fluticasone 50 mcg/actuation nasal spray,suspension 1 120 spray aerosol with adapter (grams), 30 days supply. Inhale 2 sprays in each nostril 1 time per day; after 1 week, may adjust to 1 - 2 sprays in each nostril 1 time per day.. Refills: 0, Refill as needed: no, Allow substitutions: yes  Prescription: amoxicillin 500 mg oral tablet 30 tablet, 10 days supply. Take 1 tablet by mouth every 8 hours for 10 days. Refills: 0, Refill as needed: no, Allow substitutions: yes  Pharmacy: [id number] - [phone] - [address] [place] [address]  Addendum created: February 22 11:20:56, 2019 created by: Zach Martinez body: Try steam, humidifier.  Drink plenty of liquids.

## 2019-03-11 DIAGNOSIS — F40.243 FEAR OF FLYING: ICD-10-CM

## 2019-03-11 NOTE — TELEPHONE ENCOUNTER
Requested Prescriptions   Pending Prescriptions Disp Refills     ALPRAZolam (XANAX) 0.25 MG tablet 15 tablet 0     Sig: Take 1 tablet (0.25 mg) by mouth as needed    There is no refill protocol information for this order          ALPRAZolam (XANAX) 0.25 MG tablet      Last Written Prescription Date:  8/14/18  Last Fill Quantity: 15,   # refills: 0  Last Office Visit: 2/22/19  Future Office visit:       Routing refill request to provider for review/approval because:  Drug not on the G, P or [facility] refill protocol or controlled substance

## 2019-03-13 RX ORDER — ALPRAZOLAM 0.25 MG
0.25 TABLET ORAL PRN
Qty: 15 TABLET | Refills: 0 | Status: SHIPPED | OUTPATIENT
Start: 2019-03-13 | End: 2019-10-29

## 2019-03-29 DIAGNOSIS — E03.9 ACQUIRED HYPOTHYROIDISM: ICD-10-CM

## 2019-03-29 RX ORDER — LEVOTHYROXINE SODIUM 25 UG/1
12.5 TABLET ORAL DAILY
Qty: 45 TABLET | Refills: 0 | Status: SHIPPED | OUTPATIENT
Start: 2019-03-29 | End: 2019-07-11

## 2019-03-29 NOTE — TELEPHONE ENCOUNTER
Prescription approved per [facility] [place].    Estela Orellana RN

## 2019-03-29 NOTE — TELEPHONE ENCOUNTER
"Requested Prescriptions   Pending Prescriptions Disp Refills     levothyroxine (SYNTHROID/LEVOTHROID) 25 MCG tablet  Last Written Prescription Date:  11/8/18  Last Fill Quantity: 45 tablet,  # refills: 1   Last office visit: 11/6/2018 with prescribing provider:  Dr. Dawson   Future Office Visit:     45 tablet 1     Sig: Take 0.5 tablets (12.5 mcg) by mouth daily    Thyroid Protocol Passed - 3/29/2019  2:49 PM       Passed - Patient is 12 years or older       Passed - Recent (12 mo) or future (30 days) visit within the authorizing provider's specialty    Patient had office visit in the last 12 months or has a visit in the next 30 days with authorizing provider or within the authorizing provider's specialty.  See \"Patient Info\" tab in inbasket, or \"Choose Columns\" in Meds & Orders section of the refill encounter.             Passed - Medication is active on med list       Passed - Normal TSH on file in past 12 months    Recent Labs   Lab Test 06/19/18  0840   TSH 1.90             Passed - No active pregnancy on record    If patient is pregnant or has had a positive pregnancy test, please check TSH.         Passed - No positive pregnancy test in past 12 months    If patient is pregnant or has had a positive pregnancy test, please check TSH.            "

## 2019-06-10 ENCOUNTER — TELEPHONE (OUTPATIENT)
Dept: FAMILY MEDICINE | Facility: CLINIC | Age: 34
End: 2019-06-10

## 2019-06-10 DIAGNOSIS — F33.1 MODERATE EPISODE OF RECURRENT MAJOR DEPRESSIVE DISORDER (H): ICD-10-CM

## 2019-06-10 NOTE — TELEPHONE ENCOUNTER
"Requested Prescriptions   Pending Prescriptions Disp Refills     buPROPion (WELLBUTRIN) 75 MG tablet 90 tablet 0     Sig: Take 1 tablet (75 mg) by mouth daily Follow up with provider by e-visit or phone visit       SSRIs Protocol Failed - 6/10/2019  9:14 AM        Failed - PHQ-9 score less than 5 in past 6 months     Please review last PHQ-9 score.           Failed - Recent (6 mo) or future (30 days) visit within the authorizing provider's specialty     Patient had office visit in the last 6 months or has a visit in the next 30 days with authorizing provider or within the authorizing provider's specialty.  See \"Patient Info\" tab in inbasket, or \"Choose Columns\" in Meds & Orders section of the refill encounter.            Passed - Medication is Bupropion     If the medication is Bupropion (Wellbutrin), and the patient is taking for smoking cessation; OK to refill.          Passed - Medication is active on med list        Passed - Patient is age 18 or older        Passed - No active pregnancy on record        Passed - No positive pregnancy test in last 12 months        buPROPion (WELLBUTRIN) 75 MG tablet (Discontinued)      Last Written Prescription Date:  11/2/18  Last Fill Quantity: 90,   # refills: 0  Last Office Visit: 2/22/19  Future Office visit:       Routing refill request to provider for review/approval because:  Drug not active on patient's medication list      PHQ-9 SCORE 6/6/2018 8/14/2018 11/6/2018   PHQ-9 Total Score 16 9 9     JOEL-7 SCORE 6/6/2018 8/14/2018 11/6/2018   Total Score 10 10 8         "

## 2019-06-12 RX ORDER — BUPROPION HYDROCHLORIDE 75 MG/1
75 TABLET ORAL DAILY
Qty: 30 TABLET | Refills: 0 | Status: SHIPPED | OUTPATIENT
Start: 2019-06-12 | End: 2019-06-26

## 2019-06-12 NOTE — TELEPHONE ENCOUNTER
Routing refill request to provider for review/approval because:  A break in medication: given 30 day in November.    Marquita Martínez RN, [facility]

## 2019-06-13 NOTE — TELEPHONE ENCOUNTER
Patient was due for med check 6 months after last visit (due 4/2018)  Will send one month lisset refill. Please send reminder to schedule appt

## 2019-06-26 ENCOUNTER — OFFICE VISIT (OUTPATIENT)
Dept: FAMILY MEDICINE | Facility: CLINIC | Age: 34
End: 2019-06-26
Payer: COMMERCIAL

## 2019-06-26 VITALS
SYSTOLIC BLOOD PRESSURE: 112 MMHG | TEMPERATURE: 98.4 F | BODY MASS INDEX: 25.87 KG/M2 | WEIGHT: 146 LBS | DIASTOLIC BLOOD PRESSURE: 73 MMHG | HEART RATE: 70 BPM | OXYGEN SATURATION: 100 % | RESPIRATION RATE: 18 BRPM | HEIGHT: 63 IN

## 2019-06-26 DIAGNOSIS — F33.1 MODERATE EPISODE OF RECURRENT MAJOR DEPRESSIVE DISORDER (H): Primary | ICD-10-CM

## 2019-06-26 DIAGNOSIS — F41.9 ANXIETY: ICD-10-CM

## 2019-06-26 DIAGNOSIS — E03.9 ACQUIRED HYPOTHYROIDISM: ICD-10-CM

## 2019-06-26 DIAGNOSIS — Z86.2 HISTORY OF ANEMIA: ICD-10-CM

## 2019-06-26 DIAGNOSIS — F40.243 FEAR OF FLYING: ICD-10-CM

## 2019-06-26 LAB
ERYTHROCYTE [DISTWIDTH] IN BLOOD BY AUTOMATED COUNT: 12.5 % (ref 10–15)
HCT VFR BLD AUTO: 39.5 % (ref 35–47)
HGB BLD-MCNC: 13.4 G/DL (ref 11.7–15.7)
IRON SATN MFR SERPL: 25 % (ref 15–46)
IRON SERPL-MCNC: 99 UG/DL (ref 35–180)
MCH RBC QN AUTO: 31 PG (ref 26.5–33)
MCHC RBC AUTO-ENTMCNC: 33.9 G/DL (ref 31.5–36.5)
MCV RBC AUTO: 91 FL (ref 78–100)
PLATELET # BLD AUTO: 253 10E9/L (ref 150–450)
RBC # BLD AUTO: 4.32 10E12/L (ref 3.8–5.2)
TIBC SERPL-MCNC: 392 UG/DL (ref 240–430)
TSH SERPL DL<=0.005 MIU/L-ACNC: 1.55 MU/L (ref 0.4–4)
VIT B12 SERPL-MCNC: 421 PG/ML (ref 193–986)
WBC # BLD AUTO: 6.9 10E9/L (ref 4–11)

## 2019-06-26 PROCEDURE — 84443 ASSAY THYROID STIM HORMONE: CPT | Performed by: NURSE PRACTITIONER

## 2019-06-26 PROCEDURE — 83550 IRON BINDING TEST: CPT | Performed by: NURSE PRACTITIONER

## 2019-06-26 PROCEDURE — 82607 VITAMIN B-12: CPT | Performed by: NURSE PRACTITIONER

## 2019-06-26 PROCEDURE — 99214 OFFICE O/P EST MOD 30 MIN: CPT | Performed by: NURSE PRACTITIONER

## 2019-06-26 PROCEDURE — 85027 COMPLETE CBC AUTOMATED: CPT | Performed by: NURSE PRACTITIONER

## 2019-06-26 PROCEDURE — 83540 ASSAY OF IRON: CPT | Performed by: NURSE PRACTITIONER

## 2019-06-26 PROCEDURE — 36415 COLL VENOUS BLD VENIPUNCTURE: CPT | Performed by: NURSE PRACTITIONER

## 2019-06-26 RX ORDER — BUPROPION HYDROCHLORIDE 100 MG/1
100 TABLET ORAL DAILY
Qty: 30 TABLET | Refills: 0 | Status: CANCELLED | OUTPATIENT
Start: 2019-06-26

## 2019-06-26 RX ORDER — BUPROPION HYDROCHLORIDE 75 MG/1
75 TABLET ORAL DAILY
Qty: 90 TABLET | Refills: 0 | Status: SHIPPED | OUTPATIENT
Start: 2019-06-26 | End: 2019-08-29

## 2019-06-26 RX ORDER — ALPRAZOLAM 0.25 MG
0.25 TABLET ORAL PRN
Qty: 15 TABLET | Refills: 0 | Status: CANCELLED | OUTPATIENT
Start: 2019-06-26

## 2019-06-26 RX ORDER — ALPRAZOLAM 0.5 MG
0.5 TABLET ORAL 2 TIMES DAILY PRN
Qty: 20 TABLET | Refills: 0 | Status: SHIPPED | OUTPATIENT
Start: 2019-06-26 | End: 2019-10-29

## 2019-06-26 ASSESSMENT — ANXIETY QUESTIONNAIRES
GAD7 TOTAL SCORE: 11
IF YOU CHECKED OFF ANY PROBLEMS ON THIS QUESTIONNAIRE, HOW DIFFICULT HAVE THESE PROBLEMS MADE IT FOR YOU TO DO YOUR WORK, TAKE CARE OF THINGS AT HOME, OR GET ALONG WITH OTHER PEOPLE: SOMEWHAT DIFFICULT
5. BEING SO RESTLESS THAT IT IS HARD TO SIT STILL: SEVERAL DAYS
7. FEELING AFRAID AS IF SOMETHING AWFUL MIGHT HAPPEN: SEVERAL DAYS
3. WORRYING TOO MUCH ABOUT DIFFERENT THINGS: MORE THAN HALF THE DAYS
6. BECOMING EASILY ANNOYED OR IRRITABLE: SEVERAL DAYS
2. NOT BEING ABLE TO STOP OR CONTROL WORRYING: MORE THAN HALF THE DAYS
1. FEELING NERVOUS, ANXIOUS, OR ON EDGE: MORE THAN HALF THE DAYS

## 2019-06-26 ASSESSMENT — PAIN SCALES - GENERAL: PAINLEVEL: NO PAIN (0)

## 2019-06-26 ASSESSMENT — PATIENT HEALTH QUESTIONNAIRE - PHQ9
SUM OF ALL RESPONSES TO PHQ QUESTIONS 1-9: 10
5. POOR APPETITE OR OVEREATING: MORE THAN HALF THE DAYS

## 2019-06-26 ASSESSMENT — MIFFLIN-ST. JEOR: SCORE: 1336.38

## 2019-06-26 NOTE — PROGRESS NOTES
Subjective     Inessa Ayala is a 33 year old female who presents to clinic today for the following health issues:    HPI   Depression and Anxiety Follow-Up    How are you doing with your depression since your last visit? No change    How are you doing with your anxiety since your last visit?  Worsened     Are you having other symptoms that might be associated with depression or anxiety? No    Have you had a significant life event? No     Do you have any concerns with your use of alcohol or other drugs? No    Social History     Tobacco Use     Smoking status: Never Smoker     Smokeless tobacco: Never Used   Substance Use Topics     Alcohol use: Yes     Comment: Occasionally     Drug use: No     PHQ 6/6/2018 8/14/2018 11/6/2018   PHQ-9 Total Score 16 9 9   Q9: Thoughts of better off dead/self-harm past 2 weeks Not at all Not at all Not at all     JOEL-7 SCORE 6/6/2018 8/14/2018 11/6/2018   Total Score 10 10 8     No flowsheet data found.      Suicide Assessment Five-step Evaluation and Treatment (SAFE-T)    Amount of exercise or physical activity: 6-7 days/week for an average of 45-60 minutes    Problems taking medications regularly: No    Medication side effects: none    Diet: regular (no restrictions)      Feeling like last 6 weeks has more anxiety. Sleeps great. But cannot stay awake at work. Was getting up early before her alarm clock in March/april. But now at like 9 am feeling fatigued. Stopped drinking caffeine. Denies snoring. She feels more hungry now. Doesn't feel heart racing. Workouts 6 days a week. Normally an hour. No chance that she is pregnant. No fever/chills, SOB. Stomach hurting the past few weeks, could be anxiety. Urinating okay. For the past few years this seems to be occurring around this time of year during early summer iwhen her mood seems to shift. Takes a b-complex also.       Patient Active Problem List   Diagnosis     Anxiety     Acquired hypothyroidism     Migraine without aura and  "without status migrainosus, not intractable     Moderate episode of recurrent major depressive disorder (H)     Other insomnia     Drug dependence, in remission (H)     Attention deficit hyperactivity disorder (ADHD), unspecified ADHD type     S/P LEEP     Past Surgical History:   Procedure Laterality Date     LEEP TX, CERVICAL  07/22/2014       Social History     Tobacco Use     Smoking status: Never Smoker     Smokeless tobacco: Never Used   Substance Use Topics     Alcohol use: Yes     Comment: Occasionally     Family History   Problem Relation Age of Onset     Heart Disease Father         Heart Attack     Diabetes Maternal Grandfather      Cancer Maternal Uncle            Reviewed and updated as needed this visit by Provider         Review of Systems   ROS COMP: Constitutional, cardiovascular, pulmonary psych as above, systems are negative, except as otherwise noted.      Objective    /73 (BP Location: Right arm, Patient Position: Sitting, Cuff Size: Adult Regular)   Pulse 70   Temp 98.4  F (36.9  C) (Oral)   Resp 18   Ht 1.6 m (5' 3\")   Wt 66.2 kg (146 lb)   LMP 06/03/2019   SpO2 100%   BMI 25.86 kg/m    Body mass index is 25.86 kg/m .  Physical Exam   GENERAL: healthy, alert and no distress  RESP: lungs clear to auscultation - no rales, rhonchi or wheezes  CV: regular rate and rhythm, normal S1 S2, no S3 or S4, no murmur, click or rub  MS: no gross musculoskeletal defects noted, no edema  PSYCH: mentation appears normal, affect normal but anxious, no SI    Diagnostic Test Results:  Labs reviewed in Epic  No results found for this or any previous visit (from the past 24 hour(s)).        Assessment & Plan     1. Moderate episode of recurrent major depressive disorder (H)  Start taking buproprion, increase to 75 mg BID in 7-10 if you feel it isn't too much, otherwise okay to stay at 75 mg daily  - buPROPion (WELLBUTRIN) 75 MG tablet; Take 1 tablet (75 mg) by mouth daily  Dispense: 90 tablet; Refill: " "0    2. Anxiety  As above    3. Fear of flying  refilled  - ALPRAZolam (XANAX) 0.5 MG tablet; Take 1 tablet (0.5 mg) by mouth 2 times daily as needed for anxiety  Dispense: 20 tablet; Refill: 0    4. Acquired hypothyroidism  TSH normal  - TSH with free T4 reflex    5. History of anemia  No anemia  - Iron and iron binding capacity  - CBC with platelets  - Vitamin B12     BMI:   Estimated body mass index is 25.86 kg/m  as calculated from the following:    Height as of this encounter: 1.6 m (5' 3\").    Weight as of this encounter: 66.2 kg (146 lb).       Return in about 6 weeks (around 8/7/2019), or if symptoms worsen or fail to improve.     WALTER Nguyen, NP-C  [facility]      "

## 2019-06-27 ASSESSMENT — ANXIETY QUESTIONNAIRES: GAD7 TOTAL SCORE: 11

## 2019-07-11 DIAGNOSIS — E03.9 ACQUIRED HYPOTHYROIDISM: ICD-10-CM

## 2019-07-11 DIAGNOSIS — F33.1 MODERATE EPISODE OF RECURRENT MAJOR DEPRESSIVE DISORDER (H): ICD-10-CM

## 2019-07-11 NOTE — TELEPHONE ENCOUNTER
"Requested Prescriptions   Pending Prescriptions Disp Refills     levothyroxine (SYNTHROID/LEVOTHROID) 25 MCG tablet [Pharmacy Med Name: LEVOTHYROXINE 25 MCG TABLET] 15 tablet 2     Sig: TAKE 0.5 TABLETS (12.5 MCG) BY MOUTH DAILY       Thyroid Protocol Passed - 7/11/2019  1:30 AM        Passed - Patient is 12 years or older        Passed - Recent (12 mo) or future (30 days) visit within the authorizing provider's specialty     Patient had office visit in the last 12 months or has a visit in the next 30 days with authorizing provider or within the authorizing provider's specialty.  See \"Patient Info\" tab in inbasket, or \"Choose Columns\" in Meds & Orders section of the refill encounter.              Passed - Medication is active on med list        Passed - Normal TSH on file in past 12 months     Recent Labs   Lab Test 06/26/19  1527   TSH 1.55              Passed - No active pregnancy on record     If patient is pregnant or has had a positive pregnancy test, please check TSH.          Passed - No positive pregnancy test in past 12 months     If patient is pregnant or has had a positive pregnancy test, please check TSH.          buPROPion (WELLBUTRIN) 75 MG tablet [Pharmacy Med Name: BUPROPION HCL 75 MG TABLET] 30 tablet 0     Sig: TAKE 1 TABLET (75 MG) BY MOUTH DAILY FOLLOW UP WITH PROVIDER BY E-VISIT OR PHONE VISIT       SSRIs Protocol Failed - 7/11/2019  1:30 AM        Failed - PHQ-9 score less than 5 in past 6 months     Please review last PHQ-9 score.           Passed - Medication is Bupropion     If the medication is Bupropion (Wellbutrin), and the patient is taking for smoking cessation; OK to refill.          Passed - Medication is active on med list        Passed - Patient is age 18 or older        Passed - No active pregnancy on record        Passed - No positive pregnancy test in last 12 months        Passed - Recent (6 mo) or future (30 days) visit within the authorizing provider's specialty     Patient had " "office visit in the last 6 months or has a visit in the next 30 days with authorizing provider or within the authorizing provider's specialty.  See \"Patient Info\" tab in inbasket, or \"Choose Columns\" in Meds & Orders section of the refill encounter.            levothyroxine (SYNTHROID/LEVOTHROID) 25 MCG tablet  Last Written Prescription Date:  3/29/19  Last Fill Quantity: 45,  # refills: 0   Last office visit: 6/26/2019 with prescribing provider:  Dr. Thornton   Future Office Visit:      buPROPion (WELLBUTRIN) 75 MG tablet  Last Written Prescription Date:  6/26/19  Last Fill Quantity: 90,  # refills: 0   Last office visit: 6/26/2019 with prescribing provider:  Dr. Thornton   Future Office Visit:      PHQ-9 score:    PHQ-9 SCORE 6/26/2019   PHQ-9 Total Score 10       JEOL-7 SCORE 8/14/2018 11/6/2018 6/26/2019   Total Score 10 8 11           "

## 2019-07-12 RX ORDER — LEVOTHYROXINE SODIUM 25 UG/1
12.5 TABLET ORAL DAILY
Qty: 45 TABLET | Refills: 1 | Status: SHIPPED | OUTPATIENT
Start: 2019-07-12 | End: 2019-10-30

## 2019-07-12 RX ORDER — BUPROPION HYDROCHLORIDE 75 MG/1
75 TABLET ORAL DAILY
Qty: 30 TABLET | Refills: 0 | OUTPATIENT
Start: 2019-07-12

## 2019-07-12 NOTE — TELEPHONE ENCOUNTER
Outpatient Medication Detail      Disp Refills Start End DANA   buPROPion (WELLBUTRIN) 75 MG tablet 90 tablet 0 6/26/2019  No   Sig - Route: Take 1 tablet (75 mg) by mouth daily - Oral   Sent to pharmacy as: buPROPion (WELLBUTRIN) 75 MG tablet   Class: E-Prescribe   Order: [id number]   E-Prescribing Status: Receipt confirmed by pharmacy (6/26/2019  3:23 PM CDT)     Rx filled for a 90-day supply in June 2019 to requesting pharmacy, too soon to refill.     Prescription approved per [facility] Refill Protocol - Levothyroxine.    Estela Prasad, ALYN, RN, PHN

## 2019-08-19 DIAGNOSIS — G43.009 MIGRAINE WITHOUT AURA AND WITHOUT STATUS MIGRAINOSUS, NOT INTRACTABLE: Primary | ICD-10-CM

## 2019-08-19 NOTE — TELEPHONE ENCOUNTER
"Requested Prescriptions   Pending Prescriptions Disp Refills     rizatriptan (MAXALT-MLT) 10 MG ODT [Pharmacy Med Name: RIZATRIPTAN 10 MG ODT] 9 tablet 9     Sig: TAKE 1 TABLET AT ONSET OF TYPICAL MIGRAINE. MAY REPEAT IN 1-2 HRS. MAX 2 TABS/DAY, 9 TABS/MONTH       Serotonin Agonists Failed - 8/19/2019  8:30 AM        Failed - Serotonin Agonist request needs review.     Please review patient's record. If patient has had 8 or more treatments in the past month, please forward to provider.          Passed - Blood pressure under 140/90 in past 12 months     BP Readings from Last 3 Encounters:   06/26/19 112/73   11/06/18 116/68   09/06/18 116/68                 Passed - Recent (12 mo) or future (30 days) visit within the authorizing provider's specialty     Patient had office visit in the last 12 months or has a visit in the next 30 days with authorizing provider or within the authorizing provider's specialty.  See \"Patient Info\" tab in inbasket, or \"Choose Columns\" in Meds & Orders section of the refill encounter.              Passed - Medication is active on med list        Passed - Patient is age 18 or older        Passed - No active pregnancy on record        Passed - No positive pregnancy test in past 12 months          rizatriptan (MAXALT-MLT) 10 MG ODT tab      Last Written Prescription Date:  3/8/19  Last Fill Quantity: na,   # refills: 6  Last Office Visit: 6/26/19  Future Office visit:       Routing refill request to provider for review/approval because:  Medication is reported/historical    "

## 2019-08-20 RX ORDER — RIZATRIPTAN BENZOATE 10 MG/1
TABLET, ORALLY DISINTEGRATING ORAL
Qty: 9 TABLET | Refills: 3 | Status: SHIPPED | OUTPATIENT
Start: 2019-08-20 | End: 2020-10-19

## 2019-08-20 NOTE — TELEPHONE ENCOUNTER
Routing refill request to provider for review/approval because:  Medication is reported/historical     Serotonin Agonists Failed - 8/19/2019  8:30 AM            Failed - Serotonin Agonist request needs review.       Please review patient's record. If patient has had 8 or more treatments in the past month, please forward to provider.       Supriya Melendez RN on 8/20/2019 at 9:12 AM

## 2019-08-29 ENCOUNTER — TELEPHONE (OUTPATIENT)
Dept: FAMILY MEDICINE | Facility: CLINIC | Age: 34
End: 2019-08-29

## 2019-08-29 DIAGNOSIS — F33.1 MODERATE EPISODE OF RECURRENT MAJOR DEPRESSIVE DISORDER (H): ICD-10-CM

## 2019-08-29 RX ORDER — BUPROPION HYDROCHLORIDE 75 MG/1
75 TABLET ORAL 2 TIMES DAILY
Qty: 60 TABLET | Refills: 1 | Status: SHIPPED | OUTPATIENT
Start: 2019-08-29 | End: 2019-10-29

## 2019-08-29 NOTE — TELEPHONE ENCOUNTER
Sent updated script of Buproprion 75 mg twice a day. Patient should have follow up with provider in 1-2 months to see how dosage is going.  WALTER Nguyen, NP-C  [facility]

## 2019-08-29 NOTE — TELEPHONE ENCOUNTER
Message from [facility] [phone]    buPROPion (WELLBUTRIN) 75 MG tablet    Patient requests new RX.  Patient states taking 2 tablets per day.  RX in June is only once daily dosing.  Can we get a new RX with corrected directions if she is indeed taking 2 tablets daily.    Samantha Montoya

## 2019-09-28 ENCOUNTER — HEALTH MAINTENANCE LETTER (OUTPATIENT)
Age: 34
End: 2019-09-28

## 2019-10-25 DIAGNOSIS — F33.1 MODERATE EPISODE OF RECURRENT MAJOR DEPRESSIVE DISORDER (H): ICD-10-CM

## 2019-10-25 NOTE — TELEPHONE ENCOUNTER
"Requested Prescriptions   Pending Prescriptions Disp Refills     buPROPion (WELLBUTRIN) 75 MG tablet  Last Written Prescription Date:  8/29/19  Last Fill Quantity: 60 tablet,  # refills: 1   Last office visit: 6/26/2019 with prescribing provider:  Cadence Huerta NP   Future Office Visit:   Next 5 appointments (look out 90 days)    Oct 30, 2019  8:00 AM CDT  PHYSICAL with Cadence Huerta NP  [facility] ([address] [address] [phone]          60 tablet 1     Sig: Take 1 tablet (75 mg) by mouth 2 times daily       SSRIs Protocol Failed - 10/25/2019  8:32 AM        Failed - PHQ-9 score less than 5 in past 6 months     Please review last PHQ-9 score.   PHQ-9 SCORE 8/14/2018 11/6/2018 6/26/2019   PHQ-9 Total Score 9 9 10     JOEL-7 SCORE 8/14/2018 11/6/2018 6/26/2019   Total Score 10 8 11                 Passed - Medication is Bupropion     If the medication is Bupropion (Wellbutrin), and the patient is taking for smoking cessation; OK to refill.          Passed - Medication is active on med list        Passed - Patient is age 18 or older        Passed - No active pregnancy on record        Passed - No positive pregnancy test in last 12 months        Passed - Recent (6 mo) or future (30 days) visit within the authorizing provider's specialty     Patient had office visit in the last 6 months or has a visit in the next 30 days with authorizing provider or within the authorizing provider's specialty.  See \"Patient Info\" tab in inbasket, or \"Choose Columns\" in Meds & Orders section of the refill encounter.              "

## 2019-10-27 ASSESSMENT — ENCOUNTER SYMPTOMS
PALPITATIONS: 0
ABDOMINAL PAIN: 0
DIARRHEA: 0
HEARTBURN: 1
EYE PAIN: 0
HEMATOCHEZIA: 0
JOINT SWELLING: 0
WEAKNESS: 0
SORE THROAT: 0
CHILLS: 0
ARTHRALGIAS: 0
DYSURIA: 0
FEVER: 0
BREAST MASS: 0
SHORTNESS OF BREATH: 0
HEADACHES: 0
PARESTHESIAS: 0
HEMATURIA: 0
DIZZINESS: 0
CONSTIPATION: 0
NAUSEA: 0
MYALGIAS: 0
NERVOUS/ANXIOUS: 1
FREQUENCY: 0
COUGH: 1

## 2019-10-28 DIAGNOSIS — F40.243 FEAR OF FLYING: ICD-10-CM

## 2019-10-28 NOTE — TELEPHONE ENCOUNTER
Requested Prescriptions   Pending Prescriptions Disp Refills     ALPRAZolam (XANAX) 0.5 MG tablet 20 tablet 0     Sig: Take 1 tablet (0.5 mg) by mouth 2 times daily as needed for anxiety       There is no refill protocol information for this order          ALPRAZolam (XANAX) 0.5 MG tablet      Last Written Prescription Date:  6/26/19  Last Fill Quantity: 20,   # refills: 0  Last Office Visit: 6/26/19  Future Office visit:    Next 5 appointments (look out 90 days)    Oct 30, 2019  8:00 AM CDT  [facility]erta [facility] [address] [place] [id number] [phone]           Routing refill request to provider for review/approval because:  Drug not on the [facility], [facility] or [facility] refill protocol or controlled substance

## 2019-10-28 NOTE — TELEPHONE ENCOUNTER
Routing refill request to provider for review/approval because:  Drug not on the [facility] refill protocol     Candis Justice RN

## 2019-10-29 RX ORDER — BUPROPION HYDROCHLORIDE 75 MG/1
75 TABLET ORAL 2 TIMES DAILY
Qty: 60 TABLET | Refills: 0 | Status: SHIPPED | OUTPATIENT
Start: 2019-10-29 | End: 2019-10-30

## 2019-10-29 RX ORDER — ALPRAZOLAM 0.5 MG
0.5 TABLET ORAL 2 TIMES DAILY PRN
Qty: 20 TABLET | Refills: 0 | Status: SHIPPED | OUTPATIENT
Start: 2019-10-29 | End: 2020-04-06

## 2019-10-29 NOTE — TELEPHONE ENCOUNTER
Refill sent. Has an appointment in 1 day.  WALTER Nguyen, NP-C  [facility]

## 2019-10-29 NOTE — TELEPHONE ENCOUNTER
Routing refill request to provider for review/approval because:  Patient needs apt and update PHQ-9 score.   Dose was adjusted in August.    **patient has apt scheduled with primary care tomorrow (10/30/19)    Anneliese Mcbride RN

## 2019-10-30 ENCOUNTER — OFFICE VISIT (OUTPATIENT)
Dept: FAMILY MEDICINE | Facility: CLINIC | Age: 34
End: 2019-10-30
Payer: COMMERCIAL

## 2019-10-30 VITALS
HEART RATE: 74 BPM | TEMPERATURE: 98 F | RESPIRATION RATE: 18 BRPM | WEIGHT: 137.7 LBS | SYSTOLIC BLOOD PRESSURE: 113 MMHG | DIASTOLIC BLOOD PRESSURE: 72 MMHG | HEIGHT: 64 IN | BODY MASS INDEX: 23.51 KG/M2 | OXYGEN SATURATION: 95 %

## 2019-10-30 DIAGNOSIS — F41.9 ANXIETY: ICD-10-CM

## 2019-10-30 DIAGNOSIS — Z12.4 SCREENING FOR CERVICAL CANCER: ICD-10-CM

## 2019-10-30 DIAGNOSIS — F63.3 HAIR PULLING: ICD-10-CM

## 2019-10-30 DIAGNOSIS — R53.83 OTHER FATIGUE: ICD-10-CM

## 2019-10-30 DIAGNOSIS — F33.1 MODERATE EPISODE OF RECURRENT MAJOR DEPRESSIVE DISORDER (H): ICD-10-CM

## 2019-10-30 DIAGNOSIS — Z00.00 ENCOUNTER FOR ROUTINE ADULT HEALTH EXAMINATION WITHOUT ABNORMAL FINDINGS: Primary | ICD-10-CM

## 2019-10-30 LAB
ANION GAP SERPL CALCULATED.3IONS-SCNC: 5 MMOL/L (ref 3–14)
BUN SERPL-MCNC: 17 MG/DL (ref 7–30)
CALCIUM SERPL-MCNC: 8.9 MG/DL (ref 8.5–10.1)
CHLORIDE SERPL-SCNC: 108 MMOL/L (ref 94–109)
CHOLEST SERPL-MCNC: 204 MG/DL
CO2 SERPL-SCNC: 29 MMOL/L (ref 20–32)
CREAT SERPL-MCNC: 0.97 MG/DL (ref 0.52–1.04)
ESTRADIOL SERPL-MCNC: 98 PG/ML
GFR SERPL CREATININE-BSD FRML MDRD: 76 ML/MIN/{1.73_M2}
GLUCOSE SERPL-MCNC: 83 MG/DL (ref 70–99)
HDLC SERPL-MCNC: 63 MG/DL
LDLC SERPL CALC-MCNC: 122 MG/DL
NONHDLC SERPL-MCNC: 141 MG/DL
POTASSIUM SERPL-SCNC: 3.8 MMOL/L (ref 3.4–5.3)
PROGEST SERPL-MCNC: 0.4 NG/ML
SODIUM SERPL-SCNC: 142 MMOL/L (ref 133–144)
TRIGL SERPL-MCNC: 93 MG/DL
TSH SERPL DL<=0.005 MIU/L-ACNC: 1.88 MU/L (ref 0.4–4)

## 2019-10-30 PROCEDURE — 87624 HPV HI-RISK TYP POOLED RSLT: CPT | Performed by: NURSE PRACTITIONER

## 2019-10-30 PROCEDURE — G0145 SCR C/V CYTO,THINLAYER,RESCR: HCPCS | Performed by: NURSE PRACTITIONER

## 2019-10-30 PROCEDURE — G0124 SCREEN C/V THIN LAYER BY MD: HCPCS | Performed by: NURSE PRACTITIONER

## 2019-10-30 PROCEDURE — 36415 COLL VENOUS BLD VENIPUNCTURE: CPT | Performed by: NURSE PRACTITIONER

## 2019-10-30 PROCEDURE — 80048 BASIC METABOLIC PNL TOTAL CA: CPT | Performed by: NURSE PRACTITIONER

## 2019-10-30 PROCEDURE — 99395 PREV VISIT EST AGE 18-39: CPT | Performed by: NURSE PRACTITIONER

## 2019-10-30 PROCEDURE — 82670 ASSAY OF TOTAL ESTRADIOL: CPT | Performed by: NURSE PRACTITIONER

## 2019-10-30 PROCEDURE — 84144 ASSAY OF PROGESTERONE: CPT | Performed by: NURSE PRACTITIONER

## 2019-10-30 PROCEDURE — 84443 ASSAY THYROID STIM HORMONE: CPT | Performed by: NURSE PRACTITIONER

## 2019-10-30 PROCEDURE — 80061 LIPID PANEL: CPT | Performed by: NURSE PRACTITIONER

## 2019-10-30 RX ORDER — BUPROPION HYDROCHLORIDE 75 MG/1
75 TABLET ORAL 2 TIMES DAILY
Qty: 120 TABLET | Refills: 1 | Status: SHIPPED | OUTPATIENT
Start: 2019-10-30 | End: 2020-04-06

## 2019-10-30 ASSESSMENT — ENCOUNTER SYMPTOMS
PARESTHESIAS: 0
MYALGIAS: 0
ABDOMINAL PAIN: 0
ARTHRALGIAS: 0
CHILLS: 0
DIZZINESS: 0
HEMATOCHEZIA: 0
HEADACHES: 0
HEARTBURN: 1
DYSURIA: 0
FEVER: 0
DIARRHEA: 0
HEMATURIA: 0
SHORTNESS OF BREATH: 0
WEAKNESS: 0
CONSTIPATION: 0
NAUSEA: 0
SORE THROAT: 0
PALPITATIONS: 0
NERVOUS/ANXIOUS: 1
EYE PAIN: 0
COUGH: 1
BREAST MASS: 0
JOINT SWELLING: 0
FREQUENCY: 0

## 2019-10-30 ASSESSMENT — MIFFLIN-ST. JEOR: SCORE: 1305.63

## 2019-10-30 ASSESSMENT — PAIN SCALES - GENERAL: PAINLEVEL: NO PAIN (0)
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
## 2019-10-30 NOTE — PATIENT INSTRUCTIONS
Start bright light therapy using fluorescent bulbs with 10,000 lux, 16-31 inches from face with eyes open (do not stare at light) for 30 minutes a day. Do at the same time in the morning between/around 7-8 am for depression/anxiety.  Get light box that is designed to protect the eyes with features that filters out UV rays. Have baseline opthamology exam and annually thereafter while doing bright light therapy. Insurance does not cover this.     Also recommended to get outside even on cloudy days and walk for 30 minutes as this is also shown to help with anxiety and depression as the sunlight, even on overcast days can help improve mood.

## 2019-11-05 LAB
COPATH REPORT: ABNORMAL
PAP: ABNORMAL

## 2019-11-06 LAB
FINAL DIAGNOSIS: ABNORMAL
HPV HR 12 DNA CVX QL NAA+PROBE: NEGATIVE
HPV16 DNA SPEC QL NAA+PROBE: NEGATIVE
HPV18 DNA SPEC QL NAA+PROBE: POSITIVE
SPECIMEN DESCRIPTION: ABNORMAL
SPECIMEN SOURCE CVX/VAG CYTO: ABNORMAL

## 2019-11-26 NOTE — TELEPHONE ENCOUNTER
Patient Name: Godfrey Farfan  Specialist or PCP: Dr. Schmitt  Symptoms: 9 month pregnant, water broke.   Best Availability: Anytime   Callback Number: [phone]      Thank you,  Kanika Farrell   Refill given.  Will discuss with patient tomorrow about dose or needing adjustments.  WALTER Nguyen, NP-C  [facility]

## 2019-11-29 ENCOUNTER — E-VISIT (OUTPATIENT)
Dept: FAMILY MEDICINE | Facility: CLINIC | Age: 34
End: 2019-11-29
Payer: COMMERCIAL

## 2019-11-29 DIAGNOSIS — R51.9 INTRACTABLE HEADACHE, UNSPECIFIED CHRONICITY PATTERN, UNSPECIFIED HEADACHE TYPE: Primary | ICD-10-CM

## 2019-11-29 PROCEDURE — 99207 ZZC NO BILLABLE SERVICE THIS VISIT: CPT | Performed by: NURSE PRACTITIONER

## 2019-12-10 ENCOUNTER — OFFICE VISIT (OUTPATIENT)
Dept: OBGYN | Facility: CLINIC | Age: 34
End: 2019-12-10
Payer: COMMERCIAL

## 2019-12-10 VITALS
DIASTOLIC BLOOD PRESSURE: 65 MMHG | BODY MASS INDEX: 24.48 KG/M2 | HEART RATE: 69 BPM | WEIGHT: 141.5 LBS | SYSTOLIC BLOOD PRESSURE: 105 MMHG

## 2019-12-10 DIAGNOSIS — R87.610 ASCUS WITH POSITIVE HIGH RISK HPV CERVICAL: ICD-10-CM

## 2019-12-10 DIAGNOSIS — Z32.00 PREGNANCY EXAMINATION OR TEST, PREGNANCY UNCONFIRMED: Primary | ICD-10-CM

## 2019-12-10 DIAGNOSIS — Z98.890 S/P LEEP: ICD-10-CM

## 2019-12-10 DIAGNOSIS — R87.810 ASCUS WITH POSITIVE HIGH RISK HPV CERVICAL: ICD-10-CM

## 2019-12-10 LAB — HCG UR QL: NEGATIVE

## 2019-12-10 PROCEDURE — 99242 OFF/OP CONSLTJ NEW/EST SF 20: CPT | Mod: 25 | Performed by: OBSTETRICS & GYNECOLOGY

## 2019-12-10 PROCEDURE — 88305 TISSUE EXAM BY PATHOLOGIST: CPT | Performed by: OBSTETRICS & GYNECOLOGY

## 2019-12-10 PROCEDURE — 57454 BX/CURETT OF CERVIX W/SCOPE: CPT | Performed by: OBSTETRICS & GYNECOLOGY

## 2019-12-10 PROCEDURE — 81025 URINE PREGNANCY TEST: CPT | Performed by: OBSTETRICS & GYNECOLOGY

## 2019-12-10 NOTE — PATIENT INSTRUCTIONS
If you have any questions regarding your visit, Please contact your care team.  [facility]: [phone]  [facility] CLINIC HOURS TELEPHONE NUMBER   Cephas Agbeh, M.D.          Naa Gaitan         Monday-[place]    8:00a.m-4:45 p.m    Tuesday--[place]     8:00a.m-4:45 p.m.    Thursday-[place]    8:00a.m-4:45 p.m.    Friday-[place]    8:00a.m-4:45 p.m    [facility]   [address]   [place] [id number] [phone]-Ask for [facility]   Fax [phone]   Eslasrt-[phone]     [facility]   [address] [place] [address] [place] [address]   [phone]    [facility]  [address] [facility] [address]  [phone]  [phone]   Urgent Care locations:    [facility] Monday-Friday  5 pm - 9 pm  Saturday and Sunday   9 am - 5 pm   Monday-Friday   5 pm - 9 pm  Saturday and Sunday  9 am - 5 pm    [phone]   If you need a medication refill, please contact your pharmacy. Please allow 3 business days for your refill to be completed.  As always, Thank you for trusting us with your healthcare needs!

## 2019-12-10 NOTE — PROGRESS NOTES
Patient Name: Inessa Ayala              Date: 12/10/2019   YOB: 1985                         Age: 34 year old   Phone: [phone] (home)   ________________________________________________________________________  I have been asked to see Inessa in consultation by MARY SANDOVAL  to discuss the pap smear, findings and possible further evaluation.  The patient's pap smear history is as noted:  Noted:  4/16/2018   Priority:  Medium   Overview Addendum 11/13/2019  2:14 PM by Bita Suarez RN   10/30/13   LSIL pap, cannot rule out HSIL  5/20/14   [place] bx: 5 & 7 o'clock ROSELIA 3.   7/22/14   LEEP: ROSELIA 2 with extension to margin. Plan: (Management of Women with biopsy confirmed ROSELIA 2, 3, Plan: cotest at 12 and 24 months. If all are NIL/neg HPV then she may go to q 3 yr cotesting x 20 years post LEEP)  2/9/16   NIL pap, + HR HPV (not 16 or 18) in Care Everywhere  2/18/16   [place] ECC: HPV effect.   6/7/17   LSIL pap, + HR HPV (not 16 or 18) Plan: colp  7/13/17   [place] bx at 6 o'clock: ROSELIA 1. Plan: cotest in 1 yr.   9/6/18   LSIL pap, + HR HPV 18 and other (no 16). Plan: colp bef 12/6/18 11/6/18 [place] bx's 4 o'clock: neg, 11 o'clock: Focal koilocytosis. ECC: Focal squamous atypia suggestive of koilocytosis. Plan: cotest in 1 yr.  10/30/19 Pap: ASCUS, +HR HPV type 18. Plan colpo       I attempted to ensure that the patient was educated regarding the nature of her findings and implications to date.  We reviewed the role of HPV, incidence in the population and the natural history of the infection, and its transmission.  We also reviewed ways to minimize her future risk, the effect of HPV on the cervix and treatment options available, should they be indicated.    The pathophysiology of the cervix, including a discussion of the squamous and columnar cells, metaplasia and dysplasia have been reviewed, drawings, sketches and the pamphlets were reviewed with her.      Patient's last menstrual period was 11/19/2019  (approximate).  Current Birth Control Method:     Past Medical History:   Diagnosis Date     Papanicolaou smear of cervix with low grade squamous intraepithelial lesion (LGSIL) 10/30/2013       Past Surgical History:   Procedure Laterality Date     LEEP TX, CERVICAL  07/22/2014        Outpatient Encounter Medications as of 12/10/2019   Medication Sig Dispense Refill     ALPRAZolam (XANAX) 0.5 MG tablet Take 1 tablet (0.5 mg) by mouth 2 times daily as needed for anxiety 20 tablet 0     buPROPion (WELLBUTRIN) 75 MG tablet Take 1 tablet (75 mg) by mouth 2 times daily 120 tablet 1     Multiple Vitamin TABS Take 1 tablet by mouth daily.       rizatriptan (MAXALT-MLT) 10 MG ODT TAKE 1 TABLET AT ONSET OF TYPICAL MIGRAINE. MAY REPEAT IN 1-2 HRS. MAX 2 TABS/DAY, 9 TABS/MONTH 9 tablet 3     No facility-administered encounter medications on file as of 12/10/2019.         Allergies as of 12/10/2019 - Reviewed 12/10/2019   Allergen Reaction Noted     Sudafed [fd&c red #40-fd&c yellow #6-pse]  09/29/2011     Zoloft [serotonin reuptake inhibitors]  09/29/2011       Social History     Socioeconomic History     Marital status: Single     Spouse name: None     Number of children: None     Years of education: None     Highest education level: None   Occupational History     None   Social Needs     Financial resource strain: None     Food insecurity:     Worry: None     Inability: None     Transportation needs:     Medical: None     Non-medical: None   Tobacco Use     Smoking status: Never Smoker     Smokeless tobacco: Never Used   Substance and Sexual Activity     Alcohol use: Yes     Comment: Occasionally     Drug use: No     Sexual activity: Yes     Partners: Male     Birth control/protection: Condom   Lifestyle     Physical activity:     Days per week: None     Minutes per session: None     Stress: None   Relationships     Social connections:     Talks on phone: None     Gets together: None     Attends Rastafari service: None      Active member of club or organization: None     Attends meetings of clubs or organizations: None     Relationship status: None     Intimate partner violence:     Fear of current or ex partner: None     Emotionally abused: None     Physically abused: None     Forced sexual activity: None   Other Topics Concern     Parent/sibling w/ CABG, MI or angioplasty before 65F 55M? Yes     Comment: Father 38   Social History Narrative     None        Family History   Problem Relation Age of Onset     Heart Disease Father         Heart Attack     Diabetes Maternal Grandfather      Cancer Maternal Uncle          Review Of Systems  10 point ROS of systems including Constitutional, Eyes, Respiratory, Cardiovascular, Gastroenterology, Genitourinary, Integumentary, Muscularskeletal, Psychiatric were all negative except for pertinent positives noted in my HPI and in the PMH.      Exam:   /65   Pulse 69   Wt 64.2 kg (141 lb 8 oz)   LMP 11/19/2019 (Approximate)   Breastfeeding No   BMI 24.48 kg/m    GENERAL:  WNWD female NAD  HEENT: NC/AT, EOMI  Lungs:  Good respiratory effort   SKIN: normal skin turgor  GAIT: Normal  NECK: Symmetrical, no masses noted   VULVA: Normal Genitalia  BUS: Normal  URETHRA:  No hypermobility noted  URETHRAL MEATUS:  No masses noted  VAGINA: Normal mucosa, no discharge  CERVIX: Closed, mobile, no discharge  PERIANAL:  No masses or lesions seen  EXTREMITIES: no clubbing, cyanosis, or edema    Assessment:  ASCUS, Other High Risk HPV, not 16/18    Plan:  Recommend to Proceed with Colpo  The details of the colposcopic procedure were reviewed, the risks of missed diagnoses, pain, infection, and bleeding.          Procedure:  Procedure for colposcopy and biopsy has been explained to the patient and consent obtained.    Before the procedure, it was ensured that the patient was educated regarding the nature of her findings and implications to date.  We reviewed the role of HPV and the natural history of the  infection.  We also reviewed ways to minimize her future risk, the effect of HPV on the cervix and treatment options available, should they be indicated.    The pathophysiology of the cervix, including a discussion of the squamous and columnar cells, metaplasia and dysplasia have been reviewed, drawings, sketches and the pamphlets were reviewed with her.  The details of the colposcopic procedure were reviewed, the risks of missed diagnoses, pain, infection, and bleeding.  Questions seemed to be answered before proceeding and the patient then consented to the procedure.     Speculum placed in vagina and excellent visualization of cervix achieved, cervix swabbed  with acetic acid solution.    ECC BIOPSY.  Cervical biopsy at 9 oclock.  Hemostasis effected with Silver Nitrate.     Findings:    Cervix: acetowhitening noted 9 oclock  Vaginal inspection: no visible lesions.  Procedure Summary: Patient tolerated procedure well.      Assessment:     ICD-10-CM    1. Pregnancy examination or test, pregnancy unconfirmed Z32.00 HCG Qual, Urine ([id number])   2. S/P LEEP Z98.890 Surgical pathology exam     COLP CERVIX/UPPER VAGINA W BX CERVIX/ENDOCERV CURETT   3. ASCUS with positive high risk HPV cervical R87.610 Surgical pathology exam    R87.810 COLP CERVIX/UPPER VAGINA W BX CERVIX/ENDOCERV CURETT         Plan:  Specimens labelled and sent to pathology.  Will base further treatment on pathology findings.  Post biopsy instructions given to patient and call to discuss Pathology results.  TT 30 min, in addition to the time for the procedure  CT greater than 50%, as noted above in the HPI and in the Plan.   CEPHAS AGBEH, MD.

## 2019-12-17 LAB — COPATH REPORT: NORMAL

## 2020-04-06 ENCOUNTER — E-VISIT (OUTPATIENT)
Dept: FAMILY MEDICINE | Facility: CLINIC | Age: 35
End: 2020-04-06
Payer: COMMERCIAL

## 2020-04-06 DIAGNOSIS — F40.243 FEAR OF FLYING: ICD-10-CM

## 2020-04-06 DIAGNOSIS — F41.9 ANXIETY: Primary | ICD-10-CM

## 2020-04-06 PROCEDURE — 99421 OL DIG E/M SVC 5-10 MIN: CPT | Performed by: NURSE PRACTITIONER

## 2020-04-06 RX ORDER — ALPRAZOLAM 0.5 MG
0.5 TABLET ORAL 2 TIMES DAILY PRN
Qty: 30 TABLET | Refills: 0 | Status: SHIPPED | OUTPATIENT
Start: 2020-04-06 | End: 2020-10-19

## 2020-04-06 RX ORDER — ALPRAZOLAM 0.5 MG
0.5 TABLET ORAL 2 TIMES DAILY PRN
Qty: 20 TABLET | Refills: 0 | Status: CANCELLED | OUTPATIENT
Start: 2020-04-06

## 2020-04-06 ASSESSMENT — ANXIETY QUESTIONNAIRES
6. BECOMING EASILY ANNOYED OR IRRITABLE: MORE THAN HALF THE DAYS
GAD7 TOTAL SCORE: 13
5. BEING SO RESTLESS THAT IT IS HARD TO SIT STILL: SEVERAL DAYS
3. WORRYING TOO MUCH ABOUT DIFFERENT THINGS: MORE THAN HALF THE DAYS
GAD7 TOTAL SCORE: 13
1. FEELING NERVOUS, ANXIOUS, OR ON EDGE: MORE THAN HALF THE DAYS
GAD7 TOTAL SCORE: 13
4. TROUBLE RELAXING: NEARLY EVERY DAY
7. FEELING AFRAID AS IF SOMETHING AWFUL MIGHT HAPPEN: SEVERAL DAYS
7. FEELING AFRAID AS IF SOMETHING AWFUL MIGHT HAPPEN: SEVERAL DAYS
2. NOT BEING ABLE TO STOP OR CONTROL WORRYING: MORE THAN HALF THE DAYS

## 2020-04-06 ASSESSMENT — PATIENT HEALTH QUESTIONNAIRE - PHQ9
10. IF YOU CHECKED OFF ANY PROBLEMS, HOW DIFFICULT HAVE THESE PROBLEMS MADE IT FOR YOU TO DO YOUR WORK, TAKE CARE OF THINGS AT HOME, OR GET ALONG WITH OTHER PEOPLE: SOMEWHAT DIFFICULT
SUM OF ALL RESPONSES TO PHQ QUESTIONS 1-9: 7
SUM OF ALL RESPONSES TO PHQ QUESTIONS 1-9: 7

## 2020-04-06 NOTE — TELEPHONE ENCOUNTER
Requested Prescriptions   Pending Prescriptions Disp Refills     ALPRAZolam (XANAX) 0.5 MG tablet 20 tablet 0     Sig: Take 1 tablet (0.5 mg) by mouth 2 times daily as needed for anxiety       There is no refill protocol information for this order          ALPRAZolam (XANAX) 0.5 MG tablet      Last Written Prescription Date:  10/29/19  Last Fill Quantity: 20,   # refills: 0  Last Office Visit: 10/30/19  Future Office visit:       Routing refill request to provider for review/approval because:  Drug not on the [facility], [facility] or [facility] refill protocol or controlled substance

## 2020-04-06 NOTE — TELEPHONE ENCOUNTER
Routing refill request to provider for review/approval because:  Drug not on the [facility] refill protocol     Mari Sorensen RN, BSN, PHN

## 2020-04-06 NOTE — TELEPHONE ENCOUNTER
This writer attempted to contact pt on 04/06/20      Reason for call schedule e-visit and left message.      If patient calls back:   Schedule evisit appointment within 2 business days with PCP, document that pt called and close encounter         Joie Cervantes MA

## 2020-04-06 NOTE — TELEPHONE ENCOUNTER
Needs e-visit for refills. If she submits today we can take care of this today.  WALTER Nguyen, NP-C  [facility]

## 2020-04-07 ASSESSMENT — PATIENT HEALTH QUESTIONNAIRE - PHQ9: SUM OF ALL RESPONSES TO PHQ QUESTIONS 1-9: 7

## 2020-04-07 ASSESSMENT — ANXIETY QUESTIONNAIRES: GAD7 TOTAL SCORE: 13

## 2020-08-13 ENCOUNTER — VIRTUAL VISIT (OUTPATIENT)
Dept: FAMILY MEDICINE | Facility: CLINIC | Age: 35
End: 2020-08-13
Payer: COMMERCIAL

## 2020-08-13 ENCOUNTER — MYC MEDICAL ADVICE (OUTPATIENT)
Dept: FAMILY MEDICINE | Facility: CLINIC | Age: 35
End: 2020-08-13

## 2020-08-13 DIAGNOSIS — G47.00 INSOMNIA, UNSPECIFIED TYPE: Primary | ICD-10-CM

## 2020-08-13 DIAGNOSIS — R63.5 WEIGHT GAIN: ICD-10-CM

## 2020-08-13 DIAGNOSIS — F41.9 ANXIETY: ICD-10-CM

## 2020-08-13 PROCEDURE — 99214 OFFICE O/P EST MOD 30 MIN: CPT | Mod: 95 | Performed by: NURSE PRACTITIONER

## 2020-08-13 ASSESSMENT — ANXIETY QUESTIONNAIRES
7. FEELING AFRAID AS IF SOMETHING AWFUL MIGHT HAPPEN: SEVERAL DAYS
2. NOT BEING ABLE TO STOP OR CONTROL WORRYING: NEARLY EVERY DAY
1. FEELING NERVOUS, ANXIOUS, OR ON EDGE: MORE THAN HALF THE DAYS
3. WORRYING TOO MUCH ABOUT DIFFERENT THINGS: NEARLY EVERY DAY
IF YOU CHECKED OFF ANY PROBLEMS ON THIS QUESTIONNAIRE, HOW DIFFICULT HAVE THESE PROBLEMS MADE IT FOR YOU TO DO YOUR WORK, TAKE CARE OF THINGS AT HOME, OR GET ALONG WITH OTHER PEOPLE: SOMEWHAT DIFFICULT
GAD7 TOTAL SCORE: 15
5. BEING SO RESTLESS THAT IT IS HARD TO SIT STILL: SEVERAL DAYS
6. BECOMING EASILY ANNOYED OR IRRITABLE: MORE THAN HALF THE DAYS

## 2020-08-13 ASSESSMENT — PAIN SCALES - GENERAL: PAINLEVEL: NO PAIN (0)

## 2020-08-13 ASSESSMENT — PATIENT HEALTH QUESTIONNAIRE - PHQ9
SUM OF ALL RESPONSES TO PHQ QUESTIONS 1-9: 10
5. POOR APPETITE OR OVEREATING: NEARLY EVERY DAY

## 2020-08-13 NOTE — PATIENT INSTRUCTIONS
At [facility], we strive to deliver an exceptional experience to you, every time we see you. If you receive a survey, please complete it as we do value your feedback.  If you have MyChart, you can expect to receive results automatically within 24 hours of their completion.  Your provider will send a note interpreting your results as well.   If you do not have MyChart, you should receive your results in about a week by mail.    Your care team:     Family Medicine   GORDON Hutchison APRN CNP S. Matthew Hockett, MD Pamela Kolacz, MD Angela Wermerskirchen, MD    Internal Medicine  Shakir Garzon MD     Clinic hours: Monday - Wednesday 7 am-7 pm   Thursdays and Fridays 7 am-5 pm.     [facility]: Monday - Friday 11 am-9 pm,   Saturday and Sunday 9 am-5 pm.     [facility]: Monday - Thursday 8 am - 7 pm; Friday 8 am - 6 pm    Clinic: [phone]   [facility]: [phone]     Use [email or web address] for 24/7 diagnosis and treatment of dozens of conditions.

## 2020-08-13 NOTE — PROGRESS NOTES
"Inessa Ayala is a 34 year old female who is being evaluated via a billable video visit.      The patient has been notified of following:     \"This video visit will be conducted via a call between you and your physician/provider. We have found that certain health care needs can be provided without the need for an in-person physical exam.  This service lets us provide the care you need with a video conversation.  If a prescription is necessary we can send it directly to your pharmacy.  If lab work is needed we can place an order for that and you can then stop by our lab to have the test done at a later time.    Video visits are billed at different rates depending on your insurance coverage.  Please reach out to your insurance provider with any questions.    If during the course of the call the physician/provider feels a video visit is not appropriate, you will not be charged for this service.\"    Patient has given verbal consent for Video visit? Yes  How would you like to obtain your AVS? MyChart  If you are dropped from the video visit, the video invite should be resent to: Text to cell phone: [phone]  Will anyone else be joining your video visit? No      Subjective     Inessa Ayala is a 34 year old female who presents today via video visit for the following health issues:    HPI    Depression and Anxiety Follow-Up    How are you doing with your depression since your last visit? No change    How are you doing with your anxiety since your last visit?  Worsened     Are you having other symptoms that might be associated with depression or anxiety? No    Have you had a significant life event? Housing Concerns and Health Concerns     Do you have any concerns with your use of alcohol or other drugs? No    Social History     Tobacco Use     Smoking status: Never Smoker     Smokeless tobacco: Never Used   Substance Use Topics     Alcohol use: Yes     Comment: Occasionally     Drug use: No     PHQ " 6/26/2019 4/6/2020 8/13/2020   PHQ-9 Total Score 10 7 10   Q9: Thoughts of better off dead/self-harm past 2 weeks Not at all Not at all Not at all     JOEL-7 SCORE 6/26/2019 4/6/2020 8/13/2020   Total Score - 13 (moderate anxiety) -   Total Score 11 13 15     Last PHQ-9 8/13/2020   1.  Little interest or pleasure in doing things 1   2.  Feeling down, depressed, or hopeless 3   3.  Trouble falling or staying asleep, or sleeping too much 2   4.  Feeling tired or having little energy 1   5.  Poor appetite or overeating 1   6.  Feeling bad about yourself 0   7.  Trouble concentrating 1   8.  Moving slowly or restless 1   Q9: Thoughts of better off dead/self-harm past 2 weeks 0   PHQ-9 Total Score 10   Difficulty at work, home, or with people Somewhat difficult     JOEL-7  8/13/2020   1. Feeling nervous, anxious, or on edge 2   2. Not being able to stop or control worrying 3   3. Worrying too much about different things 3   4. Trouble relaxing 3   5. Being so restless that it is hard to sit still 1   6. Becoming easily annoyed or irritable 2   7. Feeling afraid, as if something awful might happen 1   JOEL-7 Total Score 15   If you checked any problems, how difficult have they made it for you to do your work, take care of things at home, or get along with other people? Somewhat difficult       Suicide Assessment Five-step Evaluation and Treatment (SAFE-T)      How many servings of fruits and vegetables do you eat daily?  2-3    On average, how many sweetened beverages do you drink each day (Examples: soda, juice, sweet tea, etc.  Do NOT count diet or artificially sweetened beverages)?   0    How many days per week do you exercise enough to make your heart beat faster? 4    How many minutes a day do you exercise enough to make your heart beat faster? 30 - 60    How many days per week do you miss taking your medication? 0      Patient has struggled with anxiety and depression for a long time.  In January 2020 she decided to  wean off the bupropion because she was feeling better and wanted to do without  medications.  She just does not want to feel zero emotion which she feels was happening when she was on antidepressant medication.  She still uses of the Xanax once in a while.  She still has plenty of medication left from the April prescription.  Overall she has been doing well for anxiety, but notes in the past couple weeks have had significant anxiety.  She also has concerns about weight gain.  She states that she was around 135 pounds last fall, in February of this year she was 143 pounds, and currently she is 152 pounds with 32% body fat.  She is frustrated by this as she is exercising with a , also feels like she is doing well with nutritionally.  She did a fertility test in April and noted that her thyroid was normal.  She may want this rechecked.  She has noted some hair changes but her skin is okay.  Ever since she was little she is had difficulty sleeping.  She sometimes uses melatonin which helps her relax but then she wakes up frequently.  She denies snoring.  She is when she wakes up in the morning, and she does need to nap often.  In the past that she has had issues with binge eating but denies doing that recently.      Video Start Time:11a      Patient Active Problem List   Diagnosis     Anxiety     Acquired hypothyroidism     Migraine without aura and without status migrainosus, not intractable     Moderate episode of recurrent major depressive disorder (H)     Other insomnia     Drug dependence, in remission (H)     Attention deficit hyperactivity disorder (ADHD), unspecified ADHD type     S/P LEEP     Hair pulling     Past Surgical History:   Procedure Laterality Date     LEEP TX, CERVICAL  07/22/2014       Social History     Tobacco Use     Smoking status: Never Smoker     Smokeless tobacco: Never Used   Substance Use Topics     Alcohol use: Yes     Comment: Occasionally     Family History   Problem Relation Age  of Onset     Heart Disease Father         Heart Attack     Diabetes Maternal Grandfather      Cancer Maternal Uncle          Current Outpatient Medications   Medication Sig Dispense Refill     ALPRAZolam (XANAX) 0.5 MG tablet Take 1 tablet (0.5 mg) by mouth 2 times daily as needed for anxiety 30 tablet 0     Multiple Vitamin TABS Take 1 tablet by mouth daily.       rizatriptan (MAXALT-MLT) 10 MG ODT TAKE 1 TABLET AT ONSET OF TYPICAL MIGRAINE. MAY REPEAT IN 1-2 HRS. MAX 2 TABS/DAY, 9 TABS/MONTH 9 tablet 3     Allergies   Allergen Reactions     Sudafed [Fd&C Red #40-Fd&C Yellow #6-Pse]      Zoloft [Serotonin Reuptake Inhibitors]      Other reaction(s): Other, see comments  Pt states has opposite affects       Reviewed and updated as needed this visit by Provider  Tobacco  Allergies  Meds  Problems  Med Hx  Surg Hx  Fam Hx         Review of Systems   Constitutional, HEENT, cardiovascular, pulmonary, gi and gu psych as above, systems are negative, except as otherwise noted.      Objective    Vitals - Patient Reported  Pain Score: No Pain (0)      Vitals:  No vitals were obtained today due to virtual visit.    Physical Exam     GENERAL: Healthy, alert and no distress  EYES: Eyes grossly normal to inspection.  No discharge or erythema, or obvious scleral/conjunctival abnormalities.  RESP: No audible wheeze, cough, or visible cyanosis.  No visible retractions or increased work of breathing.    SKIN: Visible skin clear. No significant rash, abnormal pigmentation or lesions.  NEURO: Cranial nerves grossly intact.  Mentation and speech appropriate for age.  PSYCH: Mentation appears normal, affect normal/bright, judgement and insight intact, normal speech and appearance well-groomed.      Diagnostic Test Results:  Labs reviewed in Epic  No results found for this or any previous visit (from the past 24 hour(s)).        Assessment & Plan     1. Insomnia, unspecified type  Patient should have sleep evaluation and home  sleep study.  This can help determine if she has mild sleep apnea or not, and then would help determine if we should trial something like medication for sleep versus a CPAP  - SLEEP EVALUATION & MANAGEMENT REFERRAL - [facility] [place]  [phone] (Age 15 and up); Future    2. Anxiety  Patient has struggled with this for a long time.  Is not on medication at this time.  Check labs, she still has left over Xanax from her April prescription, she will call around September or October for refill.  We talked about using something else called buspirone, she thinks she is tried this before.   - **Comprehensive metabolic panel FUTURE anytime; Future  - Magnesium; Future  - **TSH with free T4 reflex FUTURE 1yr; Future  - Thyroid peroxidase antibody; Future  - **CBC with platelets differential FUTURE 2mo; Future  - **Vitamin D Deficiency FUTURE anytime; Future  - **Vitamin B12 FUTURE 2mo; Future    3. Weight gain  Unknown cause, check thyroid levels encouraged her to continue exercising and eating healthy             Return in about 4 weeks (around 9/10/2020), or if symptoms worsen or fail to improve.    WALTER Nguyen, NP-C  [facility]      Video-Visit Details    Type of service:  Video Visit    Video End Time 11:25a    Originating Location (pt. Location): Home    Distant Location (provider location):    Home secure location    There was some difficulty with the [employer] so provider called patient with [employer]    Platform used for Video Visit: Karson

## 2020-08-14 ASSESSMENT — ANXIETY QUESTIONNAIRES: GAD7 TOTAL SCORE: 15

## 2020-08-18 DIAGNOSIS — F41.9 ANXIETY: ICD-10-CM

## 2020-08-18 LAB
ALBUMIN SERPL-MCNC: 4 G/DL (ref 3.4–5)
ALP SERPL-CCNC: 65 U/L (ref 40–150)
ALT SERPL W P-5'-P-CCNC: 42 U/L (ref 0–50)
ANION GAP SERPL CALCULATED.3IONS-SCNC: 5 MMOL/L (ref 3–14)
AST SERPL W P-5'-P-CCNC: 30 U/L (ref 0–45)
BASOPHILS # BLD AUTO: 0.1 10E9/L (ref 0–0.2)
BASOPHILS NFR BLD AUTO: 0.9 %
BILIRUB SERPL-MCNC: 0.3 MG/DL (ref 0.2–1.3)
BUN SERPL-MCNC: 18 MG/DL (ref 7–30)
CALCIUM SERPL-MCNC: 9 MG/DL (ref 8.5–10.1)
CHLORIDE SERPL-SCNC: 109 MMOL/L (ref 94–109)
CO2 SERPL-SCNC: 27 MMOL/L (ref 20–32)
CREAT SERPL-MCNC: 0.94 MG/DL (ref 0.52–1.04)
DIFFERENTIAL METHOD BLD: NORMAL
EOSINOPHIL # BLD AUTO: 0.1 10E9/L (ref 0–0.7)
EOSINOPHIL NFR BLD AUTO: 0.9 %
ERYTHROCYTE [DISTWIDTH] IN BLOOD BY AUTOMATED COUNT: 12 % (ref 10–15)
GFR SERPL CREATININE-BSD FRML MDRD: 79 ML/MIN/{1.73_M2}
GLUCOSE SERPL-MCNC: 92 MG/DL (ref 70–99)
HCT VFR BLD AUTO: 38.9 % (ref 35–47)
HGB BLD-MCNC: 13.4 G/DL (ref 11.7–15.7)
IMM GRANULOCYTES # BLD: 0 10E9/L (ref 0–0.4)
IMM GRANULOCYTES NFR BLD: 0.2 %
LYMPHOCYTES # BLD AUTO: 1.9 10E9/L (ref 0.8–5.3)
LYMPHOCYTES NFR BLD AUTO: 32.7 %
MAGNESIUM SERPL-MCNC: 1.9 MG/DL (ref 1.6–2.3)
MCH RBC QN AUTO: 31.3 PG (ref 26.5–33)
MCHC RBC AUTO-ENTMCNC: 34.4 G/DL (ref 31.5–36.5)
MCV RBC AUTO: 91 FL (ref 78–100)
MONOCYTES # BLD AUTO: 0.4 10E9/L (ref 0–1.3)
MONOCYTES NFR BLD AUTO: 7.7 %
NEUTROPHILS # BLD AUTO: 3.3 10E9/L (ref 1.6–8.3)
NEUTROPHILS NFR BLD AUTO: 57.6 %
PLATELET # BLD AUTO: 263 10E9/L (ref 150–450)
POTASSIUM SERPL-SCNC: 3.7 MMOL/L (ref 3.4–5.3)
PROT SERPL-MCNC: 7.6 G/DL (ref 6.8–8.8)
RBC # BLD AUTO: 4.28 10E12/L (ref 3.8–5.2)
SODIUM SERPL-SCNC: 141 MMOL/L (ref 133–144)
THYROPEROXIDASE AB SERPL-ACNC: <10 IU/ML
TSH SERPL DL<=0.005 MIU/L-ACNC: 2.08 MU/L (ref 0.4–4)
VIT B12 SERPL-MCNC: 470 PG/ML (ref 193–986)
WBC # BLD AUTO: 5.7 10E9/L (ref 4–11)

## 2020-08-18 PROCEDURE — 36415 COLL VENOUS BLD VENIPUNCTURE: CPT | Performed by: NURSE PRACTITIONER

## 2020-08-18 PROCEDURE — 82607 VITAMIN B-12: CPT | Performed by: NURSE PRACTITIONER

## 2020-08-18 PROCEDURE — 83735 ASSAY OF MAGNESIUM: CPT | Performed by: NURSE PRACTITIONER

## 2020-08-18 PROCEDURE — 82306 VITAMIN D 25 HYDROXY: CPT | Performed by: NURSE PRACTITIONER

## 2020-08-18 PROCEDURE — 80050 GENERAL HEALTH PANEL: CPT | Performed by: NURSE PRACTITIONER

## 2020-08-18 PROCEDURE — 86376 MICROSOMAL ANTIBODY EACH: CPT | Performed by: NURSE PRACTITIONER

## 2020-08-18 NOTE — RESULT ENCOUNTER NOTE
Abe Wylie,   So far your labs are coming back normal. Others are still pending.   Thank you,  WALTER Nguyen, NP-C  [facility]

## 2020-08-19 LAB — DEPRECATED CALCIDIOL+CALCIFEROL SERPL-MC: 43 UG/L (ref 20–75)

## 2020-08-19 NOTE — RESULT ENCOUNTER NOTE
Hi Inessa,   Vit d and thyroid antibody are normal. Please let me know if you have questions.   Thank you,  WALTER Nguyen, NP-C  [facility]

## 2020-09-11 ENCOUNTER — VIRTUAL VISIT (OUTPATIENT)
Dept: FAMILY MEDICINE | Facility: OTHER | Age: 35
End: 2020-09-11
Payer: COMMERCIAL

## 2020-09-11 PROCEDURE — 99421 OL DIG E/M SVC 5-10 MIN: CPT | Performed by: INTERNAL MEDICINE

## 2020-09-11 NOTE — PROGRESS NOTES
"Date: 2020 08:32:20  Clinician: Lisbeth Mortensen  Clinician NPI: [id number]  Patient: Inessa Ayala  Patient : 1985  Patient Address: [address] [address] [place] [id number]  Patient Phone: [phone]  Visit Protocol: Eye conditions  Patient Summary:  Inessa is a 34 year old (: 1985 ) female who initiated a Visit for stye.  When asked the question \"Please sign me up to receive news, health information and promotions from [employer].\", Inessa responded \"No\".    Images of her eye condition were uploaded.   Her symptoms started 3-6 days ago and affect the left eye. The symptoms consist of eyelid swelling and eye pain.   Symptom details   Eye pain: She is experiencing moderate eye pain (4-6 on a 10 point pain scale). She has not taken over-the-counter medication for the pain.   Denied symptoms include bumps on the eyelid, light sensitivity, drainage coming from the eye(s), eye redness, and itchy eye(s). Inessa does not have subconjunctival hemorrhage. She does not feel feverish. Visual acuity was unable to be evaluated.   Precipitating events  Inessa wears contact lenses. She has not slept in them in the past week.   She has not had a recent eye injury, foreign body in the eye(s), cold or ear infection, and eye surgery.   Pertinent medical history  Inessa has not ever been diagnosed with glaucoma.   Inessa is not taking medication to treat her current symptoms.   Inessa does not require proof of evaluation of her eye condition before returning to school, work, or .   Inessa does not smoke or use smokeless tobacco.   She denies pregnancy and denies breastfeeding. She has menstruated in the past month.     MEDICATIONS: No current medications, ALLERGIES: NKDA  Clinician Response:  Dear Inessa,  Based on the information provided, the bump on your eyelid is most likely a stye (hordeolum). A stye is a red, painful bump that forms when glands on the edge of the eyelid become " "infected or inflamed.  Medication information  I am prescribing:  Bacitracin 500 units/gram ophthalmic (eye) ointment. Apply 0.25-0.5 inch ribbon into the affected eye(s) every 3-4 hours for 7-10 days. There are no refills with this prescription.  The medication I prescribed is an antibiotic medication. Infections can be caused by either bacteria or a virus, and often have similar symptoms, so it is possible that this is a viral infection. Antibiotics are only effective against bacterial infections, so when it is caused by a virus, the medication will not help symptoms improve or make it less contagious.  Self care  I recommend wetting a clean washcloth with warm water and gently placing it on the bump/swollen area of your eye for 10-15 minutes. Reheat the washcloth with warm water when it cools. Doing this 3-4 times a day will ease soreness and help the stye to drain. Do not squeeze or try to pop your stye because this could cause the infection to spread.  To reduce the spread of the eye infection, you should not wear contacts or use eye makeup until the infection has fully resolved, and be sure to wash your hands at least once per hour and avoid touching the eyes as much as possible.  The following will reduce the risk for future eye infections:     Frequent handwashing    Replace towels and washcloths daily    Do not share towels and washcloths with others    Replace contacts and cases used while eyes were infected    Do not sleep in contacts that are not FDA-approved for overnight wear    Do not reuse or \"top off\" contact solution    Replace eye makeup used while eyes were infected    Do not use anyone else's eye makeup     Steps you can take to be as comfortable as possible:     Take regular breaks and remember to blink regularly when reading or using a computer for long periods of time    Wear sunglasses when outside    Wear eye protection when swimming or working with chemicals    Use good lighting     When " to seek care  Please make an appointment to be seen in a clinic or urgent care if any of the following occurs:     You develop new symptoms or your symptoms becomes worse.    Your symptoms do not improve within 2 days of starting treatment.      Diagnosis: Stye (hordeolum externum)  Diagnosis ICD: H00.019  Prescription: bacitracin 500 unit/gram ophthalmic (eye) ointment 1 3.5 gram tube, 10 days supply. Apply 0.25-0.5 inch ribbon into the affected eye(s) every 3-4 hours for 7-10 days. Refills: 0, Refill as needed: no, Allow substitutions: yes  Pharmacy: [facility] [place], [place] - [phone] - [address] [address] [place] [id number]

## 2020-09-21 ENCOUNTER — FCC EXTENDED DOCUMENTATION (OUTPATIENT)
Dept: PSYCHOLOGY | Facility: CLINIC | Age: 35
End: 2020-09-21

## 2020-09-21 NOTE — PROGRESS NOTES
Discharge Summary  Single Session    Client Name: Inessa Ayala MRN#: [id number] YOB: 1985      Intake / Discharge Date: 6/4/2018      DSM5 Diagnoses:   Diagnoses:  Attention-Deficit/Hyperactivity Disorder  314.01 (F90.1) Predominantly hyperactive / impulsive presentation Severity: Mild - PEr client - not assessed by this therapist  296.89 Bipolar II Disorder mild - RULE OUT  296.35 (F33.41)  Major Depressive Disorder, Recurrent Episode, In partial remission _  300.02 (F41.1) Generalized Anxiety Disorder  Psychosocial & Contextual Factors: Death of BF 2005, Inconsistent functioning in life  WHODAS 2.0 (12 item) Score: 15 at inake          Presenting Concern:  Client reports the reason for seeking therapy at this time as having anxiety, maybe depression with times of trouble getting out of bed and low motivation.       Reason for Discharge:  Referred to Vesta Tate for ADHD and psych testing      Disposition at Time of Last Encounter:   Comments:   Cooperative and seeking help     Risk Management:   Client Client denied a history of suicidal ideation.  She reports occasional thoughts of life is hard but not plans or intent to harm self or others. Nancy believes she had suicidal thoughts because of a medication and talked to PCP and changed medications.  Client denied a history of suicide attempts.    Client denied a history of homicidal ideation.    Client denied a history of self-injurious ideation and behaviors.    Client denied a history of personal safety concerns.    Client denied a history of assaultive behaviors.    Recommended that patient call 911 or go to the local ED should there be a change in any of these risk factors.      Referred To:  Vesta Stern for psycholgical diagnosis        Nyasia Gómez LP   9/21/2020

## 2020-10-16 NOTE — PATIENT INSTRUCTIONS
- Non stimulants for ADHD      Intuniv      Clonidine      Strattera           Preventive Health Recommendations  Female Ages 26 - 39  Yearly exam:   See your health care provider every year in order to    Review health changes.     Discuss preventive care.      Review your medicines if you your doctor has prescribed any.    Until age 30: Get a Pap test every three years (more often if you have had an abnormal result).    After age 30: Talk to your doctor about whether you should have a Pap test every 3 years or have a Pap test with HPV screening every 5 years.   You do not need a Pap test if your uterus was removed (hysterectomy) and you have not had cancer.  You should be tested each year for STDs (sexually transmitted diseases), if you're at risk.   Talk to your provider about how often to have your cholesterol checked.  If you are at risk for diabetes, you should have a diabetes test (fasting glucose).  Shots: Get a flu shot each year. Get a tetanus shot every 10 years.   Nutrition:     Eat at least 5 servings of fruits and vegetables each day.    Eat whole-grain bread, whole-wheat pasta and brown rice instead of white grains and rice.    Get adequate Calcium and Vitamin D.     Lifestyle    Exercise at least 150 minutes a week (30 minutes a day, 5 days of the week). This will help you control your weight and prevent disease.    Limit alcohol to one drink per day.    No smoking.     Wear sunscreen to prevent skin cancer.    See your dentist every six months for an exam and cleaning.

## 2020-10-16 NOTE — PROGRESS NOTES
SUBJECTIVE:   CC: Inessa Ayala is an 35 year old woman who presents for preventive health visit.       Patient has been advised of split billing requirements and indicates understanding: Yes  Healthy Habits:     Getting at least 3 servings of Calcium per day:  NO    Bi-annual eye exam:  Yes    Dental care twice a year:  NO    Sleep apnea or symptoms of sleep apnea:  Daytime drowsiness    Diet:  Regular (no restrictions)    Frequency of exercise:  2-3 days/week    Duration of exercise:  30-45 minutes    Taking medications regularly:  Yes    Medication side effects:  Not applicable    PHQ-2 Total Score: 2    Additional concerns today:  No    Depression/Anxiety/ADHD   - Recently re-started counseling going well   - Failed on a lot of medications   - Sometimes takes daily medication in the winter but trying to avoid this   - Also failed on Adderall, made her feel like she was on speed   - Takes Xanax very sparingly      Last filled April, thinks previous provider did #30 with 1-2 refills in calendar year      Aware of risks       Today's PHQ-2 Score:   PHQ-2 ( 1999 Pf[employer]) 10/27/2019   Q1: Little interest or pleasure in doing things 1   Q2: Feeling down, depressed or hopeless 0   PHQ-2 Score 1   Q1: Little interest or pleasure in doing things Several days   Q2: Feeling down, depressed or hopeless Not at all   PHQ-2 Score 1       Abuse: Current or Past (Physical, Sexual or Emotional) - No  Do you feel safe in your environment? Yes      Social History     Tobacco Use     Smoking status: Never Smoker     Smokeless tobacco: Never Used   Substance Use Topics     Alcohol use: Yes     Comment: Occasionally       Reviewed orders with patient.  Reviewed health maintenance and updated orders accordingly - Yes  Labs reviewed in EPIC  BP Readings from Last 3 Encounters:   10/19/20 108/56   12/10/19 105/65   10/30/19 113/72    Wt Readings from Last 3 Encounters:   10/19/20 66.6 kg (146 lb 14.4 oz)   12/10/19 64.2 kg  (141 lb 8 oz)   10/30/19 62.5 kg (137 lb 11.2 oz)            Mammogram not appropriate for this patient based on age.    Pertinent mammograms are reviewed under the imaging tab.  History of abnormal Pap smear: YES - updated in Problem List and Health Maintenance accordingly  PAP / HPV Latest Ref Rng & Units 10/30/2019 9/6/2018 9/5/2018   PAP - ASC-US(A) LSIL(A) -   HPV 16 DNA NEG:Negative Negative - Negative   HPV 18 DNA NEG:Negative Positive(A) - Positive(A)   OTHER HR HPV NEG:Negative Negative - Positive(A)     Reviewed and updated as needed this visit by clinical staff  Tobacco  Allergies  Meds  Problems  Med Hx  Surg Hx  Fam Hx  Soc Hx          Reviewed and updated as needed this visit by Provider  Tobacco  Allergies  Meds  Problems  Med Hx  Surg Hx  Fam Hx           Past Medical History:   Diagnosis Date     History of colposcopy with cervical biopsy 12/10/2019    see problem list     Papanicolaou smear of cervix with low grade squamous intraepithelial lesion (LGSIL) 10/30/2013      Past Surgical History:   Procedure Laterality Date     LEEP TX, CERVICAL  07/22/2014       Review of Systems   Constitutional: Negative for chills and fever.   HENT: Negative for congestion, ear pain, hearing loss and sore throat.    Eyes: Negative for pain and visual disturbance.   Respiratory: Positive for cough. Negative for shortness of breath.    Cardiovascular: Negative for chest pain, palpitations and peripheral edema.   Gastrointestinal: Negative for abdominal pain, constipation, diarrhea, heartburn, hematochezia and nausea.   Breasts:  Negative for tenderness, breast mass and discharge.   Genitourinary: Negative for dysuria, frequency, genital sores, hematuria, pelvic pain, urgency, vaginal bleeding and vaginal discharge.   Musculoskeletal: Negative for arthralgias, joint swelling and myalgias.   Skin: Negative for rash.   Neurological: Negative for dizziness, weakness, headaches and paresthesias.  "  Psychiatric/Behavioral: Negative for mood changes. The patient is nervous/anxious.           OBJECTIVE:   /56   Pulse 64   Temp 97.5  F (36.4  C) (Temporal)   Resp 18   Ht 1.6 m (5' 3\")   Wt 66.6 kg (146 lb 14.4 oz)   LMP 09/29/2020 (Approximate)   SpO2 100%   Breastfeeding No   BMI 26.02 kg/m    Physical Exam  Constitutional:       General: She is not in acute distress.     Appearance: She is well-developed.   HENT:      Right Ear: External ear normal.      Left Ear: External ear normal.      Nose: Nose normal.      Mouth/Throat:      Pharynx: No oropharyngeal exudate.   Eyes:      General:         Right eye: No discharge.         Left eye: No discharge.      Conjunctiva/sclera: Conjunctivae normal.      Pupils: Pupils are equal, round, and reactive to light.   Neck:      Musculoskeletal: Normal range of motion and neck supple.      Thyroid: No thyromegaly.      Vascular: No JVD.      Trachea: No tracheal deviation.   Cardiovascular:      Rate and Rhythm: Normal rate and regular rhythm.      Heart sounds: Normal heart sounds. No murmur. No friction rub. No gallop.    Pulmonary:      Effort: Pulmonary effort is normal. No respiratory distress.      Breath sounds: Normal breath sounds. No stridor. No wheezing or rales.   Chest:      Breasts: Breasts are symmetrical.         Right: No inverted nipple, mass, nipple discharge or skin change.         Left: No inverted nipple, mass, nipple discharge or skin change.   Abdominal:      General: Bowel sounds are normal. There is no distension.      Palpations: Abdomen is soft. There is no mass.      Tenderness: There is no abdominal tenderness. There is no guarding or rebound.      Hernia: No hernia is present.   Genitourinary:     Vagina: Normal. No vaginal discharge.      Cervix: No cervical motion tenderness or discharge.      Adnexa:         Right: No mass, tenderness or fullness.          Left: No mass, tenderness or fullness.     Musculoskeletal: Normal " range of motion.   Lymphadenopathy:      Cervical: No cervical adenopathy.   Skin:     General: Skin is warm and dry.   Neurological:      Mental Status: She is alert and oriented to person, place, and time.   Psychiatric:         Behavior: Behavior normal.         Thought Content: Thought content normal.         Judgment: Judgment normal.         Diagnostic Test Results:  Labs reviewed in Epic  none     ASSESSMENT/PLAN:       ICD-10-CM    1. Routine general medical examination at a health care facility  Z00.00    2. Migraine without aura and without status migrainosus, not intractable  G43.009 rizatriptan (MAXALT-MLT) 10 MG ODT   3. JOEL (generalized anxiety disorder)  F41.1    4. Fear of flying  F40.243 ALPRAZolam (XANAX) 0.5 MG tablet   5. Attention deficit hyperactivity disorder (ADHD), unspecified ADHD type  F90.9    6. Moderate episode of recurrent major depressive disorder (H)  F33.1    7. High risk HPV infection  B97.7    8. S/P LEEP  Z98.890    9. Need for prophylactic vaccination and inoculation against influenza  Z23 INFLUENZA VACCINE IM > 6 MONTHS VALENT IIV4 [01068]     Vaccine Administration, Initial [83818]     - Migraines      Stable on current regimen      Reviewed use and side effects, refilled     - Mood      Not great, but better with recently restarted counseling      Failed on a lot of medications in the past, recommend she consider Genesight testing     Xanax - takes occasionally, discussed not a great long term solution       Last fill was April for #30      Will give her another fill, states usually gets #30 with 1-2 refills and gets through a year       Reviewed use and side effects including sedation and addiction, no driving on this medication     - ADHD      Wondered about medications for this as failed on Adderall (only one she tried)      Does have a full eval to support diagnosis      Discussed various non-stimulant options she could try      She did mention that she wants to try to  "get pregnant in the last few years      Discussed optimal mood control prior to this and no ADHD medications indicated during pregnancy       Patient has been advised of split billing requirements and indicates understanding: Yes  COUNSELING:  Reviewed preventive health counseling, as reflected in patient instructions  Special attention given to:        Regular exercise       Healthy diet/nutrition       Immunizations    Vaccinated for: Influenza         Contraception       Family planning    Estimated body mass index is 26.02 kg/m  as calculated from the following:    Height as of this encounter: 1.6 m (5' 3\").    Weight as of this encounter: 66.6 kg (146 lb 14.4 oz).    Weight management plan: Discussed healthy diet and exercise guidelines    She reports that she has never smoked. She has never used smokeless tobacco.      Counseling Resources:  ATP IV Guidelines  Pooled Cohorts Equation Calculator  Breast Cancer Risk Calculator  BRCA-Related Cancer Risk Assessment: FHS-7 Tool  FRAX Risk Assessment  ICSI Preventive Guidelines  Dietary Guidelines for Americans, 2010  USDA's MyPlate  ASA Prophylaxis  Lung CA Screening    In addition to the preventive visit, 15 minutes of the appointment were spent evaluating and developing a treatment plan for her additional concern(s).        Sadia Reagan PA-C  [facility]      "

## 2020-10-18 ASSESSMENT — ANXIETY QUESTIONNAIRES
1. FEELING NERVOUS, ANXIOUS, OR ON EDGE: NEARLY EVERY DAY
4. TROUBLE RELAXING: SEVERAL DAYS
GAD7 TOTAL SCORE: 15
5. BEING SO RESTLESS THAT IT IS HARD TO SIT STILL: SEVERAL DAYS
GAD7 TOTAL SCORE: 15
3. WORRYING TOO MUCH ABOUT DIFFERENT THINGS: NEARLY EVERY DAY
GAD7 TOTAL SCORE: 15
7. FEELING AFRAID AS IF SOMETHING AWFUL MIGHT HAPPEN: MORE THAN HALF THE DAYS
2. NOT BEING ABLE TO STOP OR CONTROL WORRYING: NEARLY EVERY DAY
7. FEELING AFRAID AS IF SOMETHING AWFUL MIGHT HAPPEN: MORE THAN HALF THE DAYS
6. BECOMING EASILY ANNOYED OR IRRITABLE: MORE THAN HALF THE DAYS

## 2020-10-18 ASSESSMENT — ENCOUNTER SYMPTOMS
COUGH: 1
FEVER: 0
SORE THROAT: 0
DYSURIA: 0
FREQUENCY: 0
NAUSEA: 0
HEADACHES: 0
BREAST MASS: 0
PALPITATIONS: 0
HEMATOCHEZIA: 0
HEARTBURN: 0
JOINT SWELLING: 0
DIZZINESS: 0
ABDOMINAL PAIN: 0
HEMATURIA: 0
CONSTIPATION: 0
NERVOUS/ANXIOUS: 1
WEAKNESS: 0
DIARRHEA: 0
PARESTHESIAS: 0
MYALGIAS: 0
ARTHRALGIAS: 0
EYE PAIN: 0
SHORTNESS OF BREATH: 0
CHILLS: 0

## 2020-10-18 ASSESSMENT — PATIENT HEALTH QUESTIONNAIRE - PHQ9
SUM OF ALL RESPONSES TO PHQ QUESTIONS 1-9: 7
10. IF YOU CHECKED OFF ANY PROBLEMS, HOW DIFFICULT HAVE THESE PROBLEMS MADE IT FOR YOU TO DO YOUR WORK, TAKE CARE OF THINGS AT HOME, OR GET ALONG WITH OTHER PEOPLE: SOMEWHAT DIFFICULT
SUM OF ALL RESPONSES TO PHQ QUESTIONS 1-9: 7

## 2020-10-19 ENCOUNTER — OFFICE VISIT (OUTPATIENT)
Dept: FAMILY MEDICINE | Facility: OTHER | Age: 35
End: 2020-10-19
Payer: COMMERCIAL

## 2020-10-19 VITALS
SYSTOLIC BLOOD PRESSURE: 108 MMHG | HEIGHT: 63 IN | HEART RATE: 64 BPM | TEMPERATURE: 97.5 F | OXYGEN SATURATION: 100 % | RESPIRATION RATE: 18 BRPM | DIASTOLIC BLOOD PRESSURE: 56 MMHG | BODY MASS INDEX: 26.03 KG/M2 | WEIGHT: 146.9 LBS

## 2020-10-19 DIAGNOSIS — F33.1 MODERATE EPISODE OF RECURRENT MAJOR DEPRESSIVE DISORDER (H): ICD-10-CM

## 2020-10-19 DIAGNOSIS — B97.7 HIGH RISK HPV INFECTION: ICD-10-CM

## 2020-10-19 DIAGNOSIS — Z23 NEED FOR PROPHYLACTIC VACCINATION AND INOCULATION AGAINST INFLUENZA: ICD-10-CM

## 2020-10-19 DIAGNOSIS — G43.009 MIGRAINE WITHOUT AURA AND WITHOUT STATUS MIGRAINOSUS, NOT INTRACTABLE: ICD-10-CM

## 2020-10-19 DIAGNOSIS — Z98.890 S/P LEEP: ICD-10-CM

## 2020-10-19 DIAGNOSIS — F40.243 FEAR OF FLYING: ICD-10-CM

## 2020-10-19 DIAGNOSIS — F90.9 ATTENTION DEFICIT HYPERACTIVITY DISORDER (ADHD), UNSPECIFIED ADHD TYPE: ICD-10-CM

## 2020-10-19 DIAGNOSIS — Z00.00 ROUTINE GENERAL MEDICAL EXAMINATION AT A HEALTH CARE FACILITY: Primary | ICD-10-CM

## 2020-10-19 DIAGNOSIS — F41.1 GAD (GENERALIZED ANXIETY DISORDER): ICD-10-CM

## 2020-10-19 PROCEDURE — 99213 OFFICE O/P EST LOW 20 MIN: CPT | Mod: 25 | Performed by: PHYSICIAN ASSISTANT

## 2020-10-19 PROCEDURE — G0145 SCR C/V CYTO,THINLAYER,RESCR: HCPCS | Performed by: PHYSICIAN ASSISTANT

## 2020-10-19 PROCEDURE — 99395 PREV VISIT EST AGE 18-39: CPT | Mod: 25 | Performed by: PHYSICIAN ASSISTANT

## 2020-10-19 PROCEDURE — 87624 HPV HI-RISK TYP POOLED RSLT: CPT | Performed by: PHYSICIAN ASSISTANT

## 2020-10-19 PROCEDURE — 90686 IIV4 VACC NO PRSV 0.5 ML IM: CPT | Performed by: PHYSICIAN ASSISTANT

## 2020-10-19 PROCEDURE — 90471 IMMUNIZATION ADMIN: CPT | Performed by: PHYSICIAN ASSISTANT

## 2020-10-19 RX ORDER — RIZATRIPTAN BENZOATE 10 MG/1
TABLET, ORALLY DISINTEGRATING ORAL
Qty: 9 TABLET | Refills: 3 | Status: SHIPPED | OUTPATIENT
Start: 2020-10-19 | End: 2021-03-25

## 2020-10-19 RX ORDER — ALPRAZOLAM 0.5 MG
0.5 TABLET ORAL 2 TIMES DAILY PRN
Qty: 30 TABLET | Refills: 2 | Status: SHIPPED | OUTPATIENT
Start: 2020-10-19 | End: 2020-10-21

## 2020-10-19 ASSESSMENT — ENCOUNTER SYMPTOMS
WEAKNESS: 0
BREAST MASS: 0
MYALGIAS: 0
NERVOUS/ANXIOUS: 1
DIZZINESS: 0
JOINT SWELLING: 0
SORE THROAT: 0
EYE PAIN: 0
SHORTNESS OF BREATH: 0
HEARTBURN: 0
DIARRHEA: 0
ABDOMINAL PAIN: 0
CHILLS: 0
FEVER: 0
COUGH: 1
FREQUENCY: 0
HEADACHES: 0
HEMATURIA: 0
CONSTIPATION: 0
NAUSEA: 0
ARTHRALGIAS: 0
PARESTHESIAS: 0
PALPITATIONS: 0
HEMATOCHEZIA: 0
DYSURIA: 0

## 2020-10-19 ASSESSMENT — PATIENT HEALTH QUESTIONNAIRE - PHQ9: SUM OF ALL RESPONSES TO PHQ QUESTIONS 1-9: 7

## 2020-10-19 ASSESSMENT — ANXIETY QUESTIONNAIRES: GAD7 TOTAL SCORE: 15

## 2020-10-19 ASSESSMENT — MIFFLIN-ST. JEOR: SCORE: 1330.46

## 2020-10-19 NOTE — NURSING NOTE
Patient received influenza vaccination today. See immunization tab for complete administration details.     Naomy Booker MA

## 2020-10-20 ENCOUNTER — TELEPHONE (OUTPATIENT)
Dept: FAMILY MEDICINE | Facility: OTHER | Age: 35
End: 2020-10-20

## 2020-10-20 DIAGNOSIS — Z12.4 SCREENING FOR CERVICAL CANCER: Primary | ICD-10-CM

## 2020-10-20 NOTE — TELEPHONE ENCOUNTER
We have a pap sample on this patient and no orders. Please place future orders.    Thank You,  Ashley Marina MLT(ASCP)

## 2020-10-22 LAB
COPATH REPORT: NORMAL
PAP: NORMAL

## 2020-10-23 LAB
FINAL DIAGNOSIS: NORMAL
HPV HR 12 DNA CVX QL NAA+PROBE: NEGATIVE
HPV16 DNA SPEC QL NAA+PROBE: NEGATIVE
HPV18 DNA SPEC QL NAA+PROBE: NEGATIVE
SPECIMEN DESCRIPTION: NORMAL
SPECIMEN SOURCE CVX/VAG CYTO: NORMAL

## 2020-10-26 ENCOUNTER — PATIENT OUTREACH (OUTPATIENT)
Dept: FAMILY MEDICINE | Facility: OTHER | Age: 35
End: 2020-10-26

## 2020-10-26 NOTE — TELEPHONE ENCOUNTER
10/30/13   LSIL pap, cannot rule out HSIL  5/20/14   [place] bx: 5 & 7 o'clock ROSELIA 3.   7/22/14   LEEP: ROSELIA 2 with extension to margin. Plan: (Management of Women with biopsy confirmed ROSELIA 2, 3, Plan: cotest at 12 and 24 months. If all are NIL/neg HPV then she may go to q 3 yr cotesting x 20 years post LEEP)  2/9/16   NIL pap, + HR HPV (not 16 or 18) in Care Everywhere  2/18/16   [place] ECC: HPV effect.   6/7/17   LSIL pap, + HR HPV (not 16 or 18) Plan: colp  7/13/17   [place] bx at 6 o'clock: ROSELIA 1. Plan: cotest in 1 yr.   9/6/18   LSIL pap, + HR HPV 18 and other (no 16). Plan: colp bef 12/6/18 11/6/18 [place] bx's 4 o'clock: neg, 11 o'clock: Focal koilocytosis. ECC: Focal squamous atypia suggestive of koilocytosis. Plan: cotest in 1 yr.  10/30/19 Pap: ASCUS, +HR HPV type 18. Plan colpo  12/10/19 colp. Bx: WNL. ECC: WNL. Plan: cotest 1 year  10/19/20 NIL pap, Neg HPV. Plan cotest in 1 year due bef 10/19/21.

## 2020-10-27 RX ORDER — PRENATAL VIT/IRON FUM/FOLIC AC 27MG-0.8MG
1 TABLET ORAL DAILY
COMMUNITY
End: 2022-10-26

## 2020-10-28 ENCOUNTER — PRENATAL OFFICE VISIT (OUTPATIENT)
Dept: FAMILY MEDICINE | Facility: CLINIC | Age: 35
End: 2020-10-28
Payer: COMMERCIAL

## 2020-10-28 ENCOUNTER — TELEPHONE (OUTPATIENT)
Dept: FAMILY MEDICINE | Facility: OTHER | Age: 35
End: 2020-10-28

## 2020-10-28 DIAGNOSIS — O09.519 SUPERVISION OF HIGH-RISK PREGNANCY OF ELDERLY PRIMIGRAVIDA: Primary | ICD-10-CM

## 2020-10-28 PROCEDURE — 99207 PR NO CHARGE NURSE ONLY: CPT

## 2020-10-28 NOTE — TELEPHONE ENCOUNTER
----- Message from Marcia Alejandre RN sent at 10/28/2020  8:15 AM CDT -----  Regarding: OB appts changed  At her 3:30 appointment today, will you please let Inessa know I wasn't able to schedule her  US on 11/25 - ultrasound isn't in [place] that day.  Her ultrasound is scheduled 11/24 arrive at 2:15 with a full bladder.  Ultrasound at 2:30p and lab after that.  Her appointment with you is still scheduled on 11/25.      I left a general message on her VM, but not details.    Thanks

## 2020-10-28 NOTE — TELEPHONE ENCOUNTER
I don't have appt with her today, so please make sure someone updates her on the next US appt as per the notes.  Lisbeth Lebron MD

## 2020-10-29 ENCOUNTER — MYC MEDICAL ADVICE (OUTPATIENT)
Dept: FAMILY MEDICINE | Facility: OTHER | Age: 35
End: 2020-10-29

## 2020-10-29 NOTE — TELEPHONE ENCOUNTER
Patient called in today, reviewed notes and patient called clinic yesterday and moved some appointments to [place] on 11/25 and then appt here after. Thank you

## 2020-10-30 ENCOUNTER — MYC MEDICAL ADVICE (OUTPATIENT)
Dept: FAMILY MEDICINE | Facility: OTHER | Age: 35
End: 2020-10-30

## 2020-10-30 DIAGNOSIS — O09.519 SUPERVISION OF HIGH-RISK PREGNANCY OF ELDERLY PRIMIGRAVIDA: Primary | ICD-10-CM

## 2020-10-30 NOTE — TELEPHONE ENCOUNTER
Appears to be 4 weeks 3 days by LMP. Would be too early to see by US. Though can do serial hcg levels and should be directed based on symptoms to ED over the weekend.  Lisbeth Lebron MD

## 2020-11-02 ENCOUNTER — MYC MEDICAL ADVICE (OUTPATIENT)
Dept: FAMILY MEDICINE | Facility: OTHER | Age: 35
End: 2020-11-02

## 2020-11-02 NOTE — TELEPHONE ENCOUNTER
Spoke to patient, only having spotting now. No dizziness, headaches, or short of breath. She will continue to monitor and call back with any changes. Patient declines serum HCG level and will update as needed    Dian Sheth RN

## 2020-11-03 NOTE — TELEPHONE ENCOUNTER
Schedule appointment to discuss and for genesight testing. We would not be trying any ADHD medications while she is pregnant.     Darrell Lee-GORDON Machado  [facility]

## 2020-12-16 ENCOUNTER — VIRTUAL VISIT (OUTPATIENT)
Dept: FAMILY MEDICINE | Facility: OTHER | Age: 35
End: 2020-12-16

## 2020-12-16 NOTE — PROGRESS NOTES
"Date: 2020 10:33:49  Clinician: Yuri Garcia  Clinician NPI: [id number]  Patient: Inessa Ayala  Patient : 1985  Patient Address: [address] [address] [place] [id number]  Patient Phone: [phone]  Visit Protocol: URI  Patient Summary:  Inessa is a 35 year old ( : 1985 ) female who initiated a [facility] Visit for cold, sinus infection, or influenza. When asked the question \"Please sign me up to receive news, health information and promotions from [facility].\", Inessa responded \"No\".    Inessa states her symptoms started gradually 1 month or more ago.   Her symptoms consist of facial pain or pressure, a headache, a cough, nasal congestion, malaise, and tooth pain.   Symptom details     Nasal secretions: The color of her mucus is white.    Cough: Inessa coughs a few times an hour and her cough is more bothersome at night. Phlegm does not come into her throat when she coughs. She does not believe her cough is caused by post-nasal drip.     Facial pain or pressure: She has not had the facial pain or pressure for 12 weeks or more. The facial pain or pressure feels worse when bending over or leaning forward.     Headache: Inessa has not had the headache for 12 weeks or more. She states the headache is moderate (4-6 on a 10 point pain scale).     Tooth pain: The tooth pain is not caused by a cavity, recent dental work, or other mouth problems.      Inessa denies having diarrhea, myalgias, anosmia, ear pain, wheezing, fever, nausea, chills, sore throat, ageusia, vomiting, and rhinitis. She also denies double sickening (worsening symptoms after initial improvement) and having recent facial or sinus surgery in the past 60 days. She is not experiencing dyspnea.   Precipitating events  She has not recently been exposed to someone with influenza. Inessa has not been in close contact with any high risk individuals.   Pertinent COVID-19 (Coronavirus) information  Inessa does not work or " volunteer as healthcare worker or a . In the past 14 days, Inessa has not worked or volunteered at a healthcare facility or group living setting.   In the past 14 days, she also has not lived in a congregate living setting.   Inessa has not had a close contact with a laboratory-confirmed COVID-19 patient within 14 days of symptom onset.    Inessa has been tested for COVID-19.      Date(s) of her COVID-19 test as reported by the patient (free text): 07/10/2020       Result of COVID-19 test as reported by the patient (free text): Negative       Type of test as reported by the patient (free text): Saliva? Mouth swab.        Pertinent medical history  Inessa has taken an antibiotic medication in the past month. Antibiotic details as reported by the patient (free text): I can't remember name, but took it for 7 days to help with long-lasting stye. It was not amoxicillin. Headaches were lessened during this time as well, but now back to moderate pain. Finished this on 12/7.   She has not been told by her provider to avoid NSAIDs.   Inessa does not get yeast infections when she takes antibiotics.   Inessa does not have diabetes. She denies having immunosuppressive conditions (e.g., chemotherapy, HIV, organ transplant, long-term use of steroids or other immunosuppressive medications, splenectomy). She denies having congestive heart failure and severe COPD. She does not have asthma.   Inessa does not need a return to work/school note.   Inessa does not smoke or use smokeless tobacco.   She denies pregnancy and denies breastfeeding. She has menstruated in the past month.   Additional information as reported by the patient (free text): Hi there.   I've been experiencing a headache in my t-zone since early November. Pain is worse when lying down/at night and in morning. Also pressure/tension in my jaw and by ears. I tried allergy medications initially with no success. I'm fairly confident it is a  sinus infection as I tend to get at least one a year, typically in winter. Looking to get a stronger antibiotic than the one I've taken recently. No other flu/allergy like symptoms.      Zero covid exposure or covid symptoms.   Weight: 145 lbs    MEDICATIONS: No current medications, ALLERGIES: NKDA  Clinician Response:  Dear Inessa,  I am sorry you are not feeling well. To determine the most appropriate care for you, I would like you to be seen in person to further discuss your health history and symptoms.  You will not be charged for this [facility] Visit. Thank you for trusting us with your care.  For the latest updates on COVID-19 (Coronavirus), please visit the Centers for Disease Control and Prevention (CDC).   Diagnosis: Refer for additional evaluation  Diagnosis ICD: R69

## 2021-03-25 ENCOUNTER — VIRTUAL VISIT (OUTPATIENT)
Dept: FAMILY MEDICINE | Facility: OTHER | Age: 36
End: 2021-03-25
Payer: COMMERCIAL

## 2021-03-25 DIAGNOSIS — F33.1 MODERATE EPISODE OF RECURRENT MAJOR DEPRESSIVE DISORDER (H): ICD-10-CM

## 2021-03-25 DIAGNOSIS — F41.1 GAD (GENERALIZED ANXIETY DISORDER): Primary | ICD-10-CM

## 2021-03-25 PROCEDURE — 99214 OFFICE O/P EST MOD 30 MIN: CPT | Mod: 95 | Performed by: PHYSICIAN ASSISTANT

## 2021-03-25 RX ORDER — HYDROXYZINE HYDROCHLORIDE 25 MG/1
25-50 TABLET, FILM COATED ORAL 3 TIMES DAILY PRN
Qty: 45 TABLET | Refills: 1 | Status: SHIPPED | OUTPATIENT
Start: 2021-03-25 | End: 2021-05-24

## 2021-03-25 RX ORDER — SERTRALINE HYDROCHLORIDE 25 MG/1
25 TABLET, FILM COATED ORAL DAILY
Qty: 30 TABLET | Refills: 1 | Status: SHIPPED | OUTPATIENT
Start: 2021-03-25 | End: 2021-05-24

## 2021-03-25 ASSESSMENT — ANXIETY QUESTIONNAIRES
3. WORRYING TOO MUCH ABOUT DIFFERENT THINGS: NEARLY EVERY DAY
IF YOU CHECKED OFF ANY PROBLEMS ON THIS QUESTIONNAIRE, HOW DIFFICULT HAVE THESE PROBLEMS MADE IT FOR YOU TO DO YOUR WORK, TAKE CARE OF THINGS AT HOME, OR GET ALONG WITH OTHER PEOPLE: EXTREMELY DIFFICULT
6. BECOMING EASILY ANNOYED OR IRRITABLE: NEARLY EVERY DAY
1. FEELING NERVOUS, ANXIOUS, OR ON EDGE: NEARLY EVERY DAY
GAD7 TOTAL SCORE: 21
2. NOT BEING ABLE TO STOP OR CONTROL WORRYING: NEARLY EVERY DAY
4. TROUBLE RELAXING: NEARLY EVERY DAY
5. BEING SO RESTLESS THAT IT IS HARD TO SIT STILL: NEARLY EVERY DAY
7. FEELING AFRAID AS IF SOMETHING AWFUL MIGHT HAPPEN: NEARLY EVERY DAY

## 2021-03-25 ASSESSMENT — PATIENT HEALTH QUESTIONNAIRE - PHQ9: SUM OF ALL RESPONSES TO PHQ QUESTIONS 1-9: 17

## 2021-03-25 NOTE — PROGRESS NOTES
Inessa is a 35 year old who is being evaluated via a billable telephone visit.      What phone number would you like to be contacted at? See demographics   How would you like to obtain your AVS? MyChart    Assessment & Plan     ICD-10-CM    1. JOEL (generalized anxiety disorder)  F41.1 sertraline (ZOLOFT) 25 MG tablet     hydrOXYzine (ATARAX) 25 MG tablet   2. Moderate episode of recurrent major depressive disorder (H)  F33.1 sertraline (ZOLOFT) 25 MG tablet     hydrOXYzine (ATARAX) 25 MG tablet     - Patient with known anxiety and depression, not recently on medications, presenting for evaluation due to acute worsening   - Failed in the past on Wellbutrin and Effexor, mainly when coming off them or increasing doses      Also failed on Seroquel for sleep (didn't wake up)   - SSRIs listed as allergy but patient reports that was coming off medications when she was a teenager     She is willing to try these again     Is trying to get pregnant, so would recommend these as first line for during pregnancy anyway     Discussed both Sertraline (Zoloft) and Fluoxetine (Prozac) including use and side effects  - Also recommend something for sleep and panic     Recommend Hydroxyzine, discussed how this is different from Xanax she has used in the past      Reviewed use and side effects   - Due to severity of symptoms, will recommend 2 week recheck         Plan   - Start Sertraline (Zoloft) 25 mg, 1 tablet in the morning   - Hydroxyzine (Vistaril) 1-2 tablets as needed for anxiety/panic and sleep   - Recheck 2 weeks as scheduled       Review of the result(s) of each unique test - PHQ9 & GAD7   Diagnosis or treatment significantly limited by social determinants of health - None     30 minutes spent on the date of the encounter doing chart review, history and exam, documentation and further activities as noted above    Return in about 2 weeks (around 4/8/2021) for Recheck.    GORDON Sampson [facility]  CLINIC RYLEE Wylie is a 35 year old who presents for the following health issues:   HPI    Depression and Anxiety Follow-Up    How are you doing with your depression since your last visit? Worsened     How are you doing with your anxiety since your last visit?  Worsened     Are you having other symptoms that might be associated with depression or anxiety? Yes:  Panic attacks     Have you had a significant life event? No     Do you have any concerns with your use of alcohol or other drugs? No    - Miscarried last fall   - Gotten worse since then   - Therapist every other week  - Tries to avoid medications but feels like needs it   - Planning a wedding, stressed at work   - Crying at little things   - Feels like hasn't slept since October   - Usually takes medications in the winter but didn't this year, tried to just do counseling   - Needs to take 2 Xanax at a time but rarely when has panic attacks   - Are doing family planning, maybe try to get pregnant next month or the month after       Social History     Tobacco Use     Smoking status: Never Smoker     Smokeless tobacco: Never Used   Substance Use Topics     Alcohol use: Not Currently     Comment: Occasionally     Drug use: No     PHQ 8/13/2020 10/18/2020 3/25/2021   PHQ-9 Total Score 10 7 17   Q9: Thoughts of better off dead/self-harm past 2 weeks Not at all Not at all Not at all     JOEL-7 SCORE 8/13/2020 10/18/2020 3/25/2021   Total Score - 15 (severe anxiety) -   Total Score 15 15 21       Review of Systems   Constitutional, HEENT, cardiovascular, pulmonary, gi and gu systems are negative, except as otherwise noted.      Objective     Vitals:  No vitals were obtained today due to virtual visit.    Physical Exam   healthy, alert and no distress  PSYCH: Alert and oriented times 3; coherent speech, normal   rate and volume, able to articulate logical thoughts, able   to abstract reason, no tangential thoughts, no hallucinations   or  delusions  Her affect is normal  RESP: No cough, no audible wheezing, able to talk in full sentences  Remainder of exam unable to be completed due to telephone visits    Diagnostics   None         Phone call duration: 22 minutes

## 2021-03-25 NOTE — PATIENT INSTRUCTIONS
- Start Sertraline (Zoloft) 25 mg, 1 tablet in the morning     - Hydroxyzine (Vistaril) 1-2 tablets as needed for anxiety/panic and sleep     - Recheck 2 weeks as scheduled

## 2021-03-26 ASSESSMENT — ANXIETY QUESTIONNAIRES: GAD7 TOTAL SCORE: 21

## 2021-04-08 ENCOUNTER — VIRTUAL VISIT (OUTPATIENT)
Dept: FAMILY MEDICINE | Facility: OTHER | Age: 36
End: 2021-04-08
Payer: COMMERCIAL

## 2021-04-08 DIAGNOSIS — F33.1 MODERATE EPISODE OF RECURRENT MAJOR DEPRESSIVE DISORDER (H): ICD-10-CM

## 2021-04-08 DIAGNOSIS — F41.1 GAD (GENERALIZED ANXIETY DISORDER): Primary | ICD-10-CM

## 2021-04-08 PROCEDURE — 96127 BRIEF EMOTIONAL/BEHAV ASSMT: CPT | Mod: 95 | Performed by: PHYSICIAN ASSISTANT

## 2021-04-08 PROCEDURE — 99214 OFFICE O/P EST MOD 30 MIN: CPT | Mod: 95 | Performed by: PHYSICIAN ASSISTANT

## 2021-04-08 ASSESSMENT — ANXIETY QUESTIONNAIRES
7. FEELING AFRAID AS IF SOMETHING AWFUL MIGHT HAPPEN: MORE THAN HALF THE DAYS
7. FEELING AFRAID AS IF SOMETHING AWFUL MIGHT HAPPEN: MORE THAN HALF THE DAYS
GAD7 TOTAL SCORE: 14
2. NOT BEING ABLE TO STOP OR CONTROL WORRYING: MORE THAN HALF THE DAYS
6. BECOMING EASILY ANNOYED OR IRRITABLE: MORE THAN HALF THE DAYS
1. FEELING NERVOUS, ANXIOUS, OR ON EDGE: MORE THAN HALF THE DAYS
4. TROUBLE RELAXING: MORE THAN HALF THE DAYS
3. WORRYING TOO MUCH ABOUT DIFFERENT THINGS: MORE THAN HALF THE DAYS
GAD7 TOTAL SCORE: 14
GAD7 TOTAL SCORE: 14
5. BEING SO RESTLESS THAT IT IS HARD TO SIT STILL: MORE THAN HALF THE DAYS

## 2021-04-08 ASSESSMENT — PATIENT HEALTH QUESTIONNAIRE - PHQ9
10. IF YOU CHECKED OFF ANY PROBLEMS, HOW DIFFICULT HAVE THESE PROBLEMS MADE IT FOR YOU TO DO YOUR WORK, TAKE CARE OF THINGS AT HOME, OR GET ALONG WITH OTHER PEOPLE: VERY DIFFICULT
SUM OF ALL RESPONSES TO PHQ QUESTIONS 1-9: 12
SUM OF ALL RESPONSES TO PHQ QUESTIONS 1-9: 12

## 2021-04-08 ASSESSMENT — PAIN SCALES - GENERAL: PAINLEVEL: NO PAIN (0)

## 2021-04-08 NOTE — PROGRESS NOTES
Inessa is a 35 year old who is being evaluated via a billable telephone visit.      What phone number would you like to be contacted at? [phone]  How would you like to obtain your AVS? MyChart    Assessment & Plan     ICD-10-CM    1. JOEL (generalized anxiety disorder)  F41.1    2. Moderate episode of recurrent major depressive disorder (H)  F33.1      - Marked improvement in symptoms with start of Sertraline at low dose of 25 mg 2 weeks ago   - Discussed increasing vs. Giving more time, I recommend leaving, patient in agreement   - Is also taking mainly one Hydroxyzine 25 mg (occasionally 2)     Will continue this   - Reviewed both use and side effects   - No changes to medication   - Recheck 4-6 weeks       Review of the result(s) of each unique test - PHQ9 & GAD7    Diagnosis or treatment significantly limited by social determinants of health - Depression     23 minutes spent on the date of the encounter doing chart review, history and exam, documentation and further activities as noted above    The patient indicates understanding of these issues and agrees with the plan.    Return in about 6 weeks (around 5/20/2021) for Recheck.    GORDON Sampson [facility]   Inessa is a 35 year old who presents for the following health issues     HPI     Depression Followup    How are you doing with your depression since your last visit? Improved     Are you having other symptoms that might be associated with depression? No    Have you had a significant life event?  No     Are you feeling anxious or having panic attacks?   No    Do you have any concerns with your use of alcohol or other drugs? No    - Feels like a whole different person than was even 2 weeks ago   - Noticed a change in the first few days   - 1 hour before bed taking Hydroxyzine (one) occasionally 2   - Anxiety still high but managing better   - Hasn't cried in 2 weeks   - More focused during  the day   - Getting  in 2 weeks, then leaves for X-1on (small all of it)         Social History     Tobacco Use     Smoking status: Never Smoker     Smokeless tobacco: Never Used   Substance Use Topics     Alcohol use: Not Currently     Comment: Occasionally     Drug use: No     PHQ 10/18/2020 3/25/2021 4/8/2021   PHQ-9 Total Score 7 17 12   Q9: Thoughts of better off dead/self-harm past 2 weeks Not at all Not at all Not at all     JOEL-7 SCORE 10/18/2020 3/25/2021 4/8/2021   Total Score 15 (severe anxiety) - 14 (moderate anxiety)   Total Score 15 21 14       Plan from last visit   Plan   - Start Sertraline (Zoloft) 25 mg, 1 tablet in the morning   - Hydroxyzine (Vistaril) 1-2 tablets as needed for anxiety/panic and sleep   - Recheck 2 weeks as scheduled           Review of Systems   Constitutional, HEENT, cardiovascular, pulmonary, gi and gu systems are negative, except as otherwise noted.      Objective       Vitals:  No vitals were obtained today due to virtual visit.    Physical Exam   healthy, alert and no distress  PSYCH: Alert and oriented times 3; coherent speech, normal   rate and volume, able to articulate logical thoughts, able   to abstract reason, no tangential thoughts, no hallucinations   or delusions  Her affect is normal  RESP: No cough, no audible wheezing, able to talk in full sentences  Remainder of exam unable to be completed due to telephone visits    Diagnostics   None       Phone call duration: 10 minutes

## 2021-04-09 ASSESSMENT — ANXIETY QUESTIONNAIRES: GAD7 TOTAL SCORE: 14

## 2021-04-09 ASSESSMENT — PATIENT HEALTH QUESTIONNAIRE - PHQ9: SUM OF ALL RESPONSES TO PHQ QUESTIONS 1-9: 12

## 2021-05-12 ENCOUNTER — MYC MEDICAL ADVICE (OUTPATIENT)
Dept: FAMILY MEDICINE | Facility: OTHER | Age: 36
End: 2021-05-12

## 2021-05-12 NOTE — TELEPHONE ENCOUNTER
Patient dropped off my schedule. I would not be able to adjust dose without an appointment.    Darrell Reagan PA-C  [facility]

## 2021-05-21 DIAGNOSIS — F41.1 GAD (GENERALIZED ANXIETY DISORDER): ICD-10-CM

## 2021-05-21 DIAGNOSIS — F33.1 MODERATE EPISODE OF RECURRENT MAJOR DEPRESSIVE DISORDER (H): ICD-10-CM

## 2021-05-24 ENCOUNTER — MYC MEDICAL ADVICE (OUTPATIENT)
Dept: FAMILY MEDICINE | Facility: OTHER | Age: 36
End: 2021-05-24

## 2021-05-24 RX ORDER — HYDROXYZINE HYDROCHLORIDE 25 MG/1
25-50 TABLET, FILM COATED ORAL 3 TIMES DAILY PRN
Qty: 45 TABLET | Refills: 1 | Status: SHIPPED | OUTPATIENT
Start: 2021-05-24 | End: 2021-10-21

## 2021-05-24 RX ORDER — SERTRALINE HYDROCHLORIDE 25 MG/1
25 TABLET, FILM COATED ORAL DAILY
Qty: 30 TABLET | Refills: 1 | Status: SHIPPED | OUTPATIENT
Start: 2021-05-24 | End: 2021-07-26

## 2021-05-24 ASSESSMENT — PATIENT HEALTH QUESTIONNAIRE - PHQ9
SUM OF ALL RESPONSES TO PHQ QUESTIONS 1-9: 11
10. IF YOU CHECKED OFF ANY PROBLEMS, HOW DIFFICULT HAVE THESE PROBLEMS MADE IT FOR YOU TO DO YOUR WORK, TAKE CARE OF THINGS AT HOME, OR GET ALONG WITH OTHER PEOPLE: SOMEWHAT DIFFICULT
SUM OF ALL RESPONSES TO PHQ QUESTIONS 1-9: 11

## 2021-05-24 NOTE — TELEPHONE ENCOUNTER
Prescription approved per [facility] Refill Protocol. (Hydroxzine)   Sent patient PHQ9 to update records.     Darrell, please review and advise the Zoloft.     SSRIs Protocol [id number]/21/2021 12:04 PM   PHQ-9 score less than 5 in past 6 months Protocol Details    No active pregnancy on record      Micky Lindsey RN, BSN  [place] [place]  May 24, 2021

## 2021-05-25 ASSESSMENT — PATIENT HEALTH QUESTIONNAIRE - PHQ9: SUM OF ALL RESPONSES TO PHQ QUESTIONS 1-9: 11

## 2021-07-06 ENCOUNTER — TELEPHONE (OUTPATIENT)
Dept: OBGYN | Facility: OTHER | Age: 36
End: 2021-07-06

## 2021-07-06 ENCOUNTER — NURSE TRIAGE (OUTPATIENT)
Dept: NURSING | Facility: CLINIC | Age: 36
End: 2021-07-06

## 2021-07-06 DIAGNOSIS — Z87.59 HISTORY OF MISCARRIAGE: ICD-10-CM

## 2021-07-06 DIAGNOSIS — Z32.01 PREGNANCY EXAMINATION OR TEST, POSITIVE RESULT: Primary | ICD-10-CM

## 2021-07-06 NOTE — TELEPHONE ENCOUNTER
Inessa is about5-6 weeks ob patient. She has breast  Tenderness but no nausea as of yet.She has some slight feeling of tightness in abdomen, not really cramp like.  She had a early loss of pregnancy last fall so is hyperaware of any changes.  Per protocol nothing is sounding out of the ordinary. She will call back as is necessary. She is waiting for opening to see her provider sooner if possible.      Reason for Disposition    MILD abdominal pain (e.g., doesn't interfere with normal activities)    Additional Information    Negative: Passed out (i.e., fainted, collapsed and was not responding)    Negative: Shock suspected (e.g., cold/pale/clammy skin, too weak to stand, low BP, rapid pulse)    Negative: Difficult to awaken or acting confused (e.g., disoriented, slurred speech)    Negative: Sounds like a life-threatening emergency to the triager    Negative: Abdominal pain and pregnant > 20 weeks    Negative: MILD constant abdominal pain (e.g., doesn't interfere with normal activities) and present > 2 hours    Negative: Intermittent lower abdominal pain lasting > 24 hours    Negative: Vaginal bleeding or spotting    Negative: White of the eyes have turned yellow (i.e., jaundice)    Negative: Lightheadedness or dizziness    Negative: Patient sounds very sick or weak to the triager    Negative: Fever > 100.4 F (38.0 C)    Negative: Unusual vaginal discharge (e.g., odorous, yellow, green, or foamy-white)    Negative: Discomfort when passing urine (e.g., pain, burning or stinging)    Negative: Blood in urine (red, pink, or tea-colored)    Negative: Patient wants to be seen    Negative: Prior history of 'ectopic pregnancy' or previous tubal surgery (e.g., tubal ligation)    Negative: Has IUD    Negative: Not feeling pregnant any longer (e.g., breast tenderness or nausea has disappeared)    Negative: Upper abdominal pains and radiate into chest, or sour taste in mouth    Negative: Upper abdominal pains and occur regularly  about 1 hour after meals    Negative: Abdominal pain is a chronic symptom (recurrent or ongoing AND lasting > 4 weeks)    Protocols used: PREGNANCY - ABDOMINAL PAIN LESS THAN 20 WEEKS EGA-A-OH

## 2021-07-06 NOTE — TELEPHONE ENCOUNTER
Tried to call pt.   Phone number does not go through, if pt calls back.  I scheduled her for Dr. Claudio next available date.   if she wants to get in sooner, she can see any provider from the [facility].  Tomasa Arenas CMA 7/6/2021 10:13 AM

## 2021-07-06 NOTE — TELEPHONE ENCOUNTER
Reason for Call:  Other appointment    Detailed comments: patient is 7 weeks pregnant and would like to schedule an earlier appt than 10 weeks.  Would like to schedule with Dr. Rust at [facility].  Please contact patient.  Thank you.    Phone Number Patient can be reached at: Home number on file [phone] (home)    Best Time: any    Can we leave a detailed message on this number? YES    Call taken on 7/6/2021 at 10:01 AM by Licha Mojica

## 2021-07-07 NOTE — TELEPHONE ENCOUNTER
Returned call to patient. She has called multiple times to get appointments and is getting incorrect information that she should cancel the appointment scheduled. I informed her that she does not need to cancel appointment and this is scheduled correctly. Patient is wondering if it is possible to have a virtual visit with provider sooner than 7/26/21 so she can have orders for US and labs placed. She previously had a prenatal intake in October where labs and US where going to be ordered prior to her 1st OB visit but had miscarriage.    Will route to provider to review and advise.    Kareem Coleman, CMA

## 2021-07-08 NOTE — TELEPHONE ENCOUNTER
Please let patient know I ordered the quant level.  She should come into the lab soon to have that done.  I would like her to repeat it in 48 hours as well given her history of miscarriage.  I will let her know those results when I have them.  If the number is >3,000, I will order her a dating ultrasound.  Thanks.    Razia Rust, DO

## 2021-07-09 ENCOUNTER — MYC MEDICAL ADVICE (OUTPATIENT)
Dept: OBGYN | Facility: OTHER | Age: 36
End: 2021-07-09

## 2021-07-09 ENCOUNTER — TELEPHONE (OUTPATIENT)
Dept: OBGYN | Facility: CLINIC | Age: 36
End: 2021-07-09

## 2021-07-09 NOTE — TELEPHONE ENCOUNTER
RN received pt call, relayed providers notes below. Assisted with scheduling HCG's, spent time explaining HCG's and next steps per notes.     Pt states having some cramping and spotting. ED precautions given. Verbalizes agreement and understanding.    Pt having RLS for the last week. Sending pt RLS symptom management information via [employer]. Pt will contact clinic if needs further management options if these don't help relieve symptoms.    ALY PenningtonN RN

## 2021-07-09 NOTE — TELEPHONE ENCOUNTER
, early in pregnancy, unsure of LMP.  Has first OB appt 21.    Pt scheduled HCG's starting 21, 21.    Pt asking about safety of medications in pregnancy, see listed below.    - Sertraline 25MG Daily  - Hydroxyzine 25MG Several times a week, 1-2 at time in evening for sleep/anxiety  - Alprazolam .5MG As needed for anxiety  - Daily Cleanse Vegan Capsules by [facility] Daily for digestion [tumeric, vitamin c, spirulina, chlorella powder, cascara, aloe extract].    Routing to provider for advice on medications safety/recommendations in pregnancy.    ALY PenningtonN RN

## 2021-07-11 ENCOUNTER — MYC MEDICAL ADVICE (OUTPATIENT)
Dept: OBGYN | Facility: OTHER | Age: 36
End: 2021-07-11

## 2021-07-11 NOTE — TELEPHONE ENCOUNTER
All of these are fine but I would recommend avoiding Alprazolam if possible.      Razia Rust, DO

## 2021-07-14 ENCOUNTER — LAB (OUTPATIENT)
Dept: LAB | Facility: CLINIC | Age: 36
End: 2021-07-14
Payer: COMMERCIAL

## 2021-07-14 DIAGNOSIS — Z87.59 HISTORY OF MISCARRIAGE: ICD-10-CM

## 2021-07-14 LAB — B-HCG SERPL-ACNC: ABNORMAL IU/L (ref 0–5)

## 2021-07-14 PROCEDURE — 84702 CHORIONIC GONADOTROPIN TEST: CPT

## 2021-07-14 PROCEDURE — 36415 COLL VENOUS BLD VENIPUNCTURE: CPT

## 2021-07-15 DIAGNOSIS — Z32.01 PREGNANCY EXAMINATION OR TEST, POSITIVE RESULT: Primary | ICD-10-CM

## 2021-07-15 NOTE — TELEPHONE ENCOUNTER
"Per 7/14 HCG quant result note:   \"The results of your lab work confirm you are pregnant.  Congratulations!  You can schedule your dating ultrasound for anytime now.  The order is placed.  If you have any questions, please notify me through My Chart or a telephone call.    ~Razia Rust, DO\"      "

## 2021-07-20 ENCOUNTER — NURSE TRIAGE (OUTPATIENT)
Dept: NURSING | Facility: CLINIC | Age: 36
End: 2021-07-20

## 2021-07-20 NOTE — TELEPHONE ENCOUNTER
Inessa is currently 7 weeks pregnant.  Patient states that on Sunday is noticing pain underneath left rib cage.  Pain comes and goes but is worse when lying down.  Denies dizziness.  Denies pain when breathing in.  Denies exercising too much.  Inessa states that she had some diarrhea last week.  Patient states that she has an appointment on Thursday and will wait until then and monitor and phone back if anything changes.    COVID 19 Nurse Triage Plan/Patient Instructions    Please be aware that novel coronavirus (COVID-19) may be circulating in the community. If you develop symptoms such as fever, cough, or SOB or if you have concerns about the presence of another infection including coronavirus (COVID-19), please contact your health care provider or visit [email or web address].     Disposition/Instructions    Home care recommended. Follow home care protocol based instructions.    Thank you for taking steps to prevent the spread of this virus.  o Limit your contact with others.  o Wear a simple mask to cover your cough.  o Wash your hands well and often.    Resources    [facility]: About COVID-19: [email or web address]    CDC: What to Do If You're Sick: www.cdc.gov/coronavirus/2019-ncov/about/steps-when-sick.html    CDC: Ending Home Isolation: www.cdc.gov/coronavirus/2019-ncov/hcp/disposition-in-home-patients.html     CDC: Caring for Someone: www.cdc.gov/coronavirus/2019-ncov/if-you-are-sick/care-for-someone.html     [facility]: Interim Guidance for Hospital Discharge to Home: www.health.[facility].mn.us/diseases/coronavirus/hcp/hospdischarge.pdf    [facility] clinical trials (COVID-19 research studies): clinicalaffairs.[facility]/umn-clinical-trials     Below are the COVID-19 hotlines at the [place] Department of Health ([facility]). Interpreters are available.   o For health questions: Call [phone] or 1-122.928.6819 (7 a.m. to 7 p.m.)  o For questions about schools and childcare: Call  [phone] or 1-920.501.8789 (7 a.m. to 7 p.m.)                       Reason for Disposition    Chest pain(s) lasting a few seconds    Additional Information    Negative: Severe difficulty breathing (e.g., struggling for each breath, speaks in single words)    Negative: Passed out (i.e., fainted, collapsed and was not responding)    Negative: Difficult to awaken or acting confused (e.g., disoriented, slurred speech)    Negative: Shock suspected (e.g., cold/pale/clammy skin, too weak to stand, low BP, rapid pulse)    Negative: Chest pain lasting longer than 5 minutes and ANY of the following:* Over 45 years old* Over 30 years old and at least one cardiac risk factor (e.g., diabetes, high blood pressure, high cholesterol, smoker, or strong family history of heart disease)* History of heart disease (i.e., angina, heart attack, heart failure, bypass surgery, takes nitroglycerin)* Pain is crushing, pressure-like, or heavy    Negative: Heart beating < 50 beats per minute OR > 140 beats per minute    Negative: Visible sweat on face or sweat dripping down face    Negative: Sounds like a life-threatening emergency to the triager    Negative: SEVERE chest pain    Negative: Pain also in shoulder(s) or arm(s) or jaw    Negative: Difficulty breathing    Negative: Cocaine use within last 3 days    Negative: Major surgery in the past month    Negative: Hip or leg fracture (broken bone) in past month (or had cast on leg or ankle in past month)    Negative: Illness requiring prolonged bedrest in past month (e.g., immobilization, long hospital stay)    Negative: Long-distance travel in past month (e.g., car, bus, train, plane; with trip lasting 6 or more hours)    Negative: History of prior 'blood clot' in leg or lungs (i.e., deep vein thrombosis, pulmonary embolism)    Negative: History of inherited increased risk of blood clots (e.g., Factor 5 Leiden, Anti-thrombin 3, Protein C or Protein S deficiency, Prothrombin mutation)     Negative: Heart beating irregularly or very rapidly    Negative: Chest pain lasting longer than 5 minutes and occurred in last 3 days (72 hours) (Exception: feels exactly the same as previously diagnosed heartburn and has accompanying sour taste in mouth)    Negative: Chest pain or 'angina' comes and goes and is happening more often (increasing in frequency) or getting worse (increasing in severity) (Exception: chest pains that last only a few seconds)    Negative: Dizziness or lightheadedness    Negative: Coughing up blood    Negative: Patient sounds very sick or weak to the triager    Negative: Cancer treatment in the past two months (or has cancer now)    Negative: Patient says chest pain feels exactly the same as previously diagnosed 'heartburn'  and  describes burning in chest and accompanying sour taste in mouth    Negative: Chest pain(s) lasting a few seconds persists > 3 days    Negative: Fever > 100.4 F (38.0 C)    Negative: Rash in same area as pain (may be described as 'small blisters')    Negative: All other patients with chest pain (Exception: fleeting chest pain lasting a few seconds)    Negative: Patient wants to be seen    Negative: Chest pain(s) lasting a few seconds from coughing    Protocols used: CHEST PAIN-A-OH

## 2021-07-22 ENCOUNTER — ANCILLARY PROCEDURE (OUTPATIENT)
Dept: ULTRASOUND IMAGING | Facility: OTHER | Age: 36
End: 2021-07-22
Attending: OBSTETRICS & GYNECOLOGY
Payer: COMMERCIAL

## 2021-07-22 DIAGNOSIS — Z32.01 PREGNANCY EXAMINATION OR TEST, POSITIVE RESULT: ICD-10-CM

## 2021-07-22 PROCEDURE — 76801 OB US < 14 WKS SINGLE FETUS: CPT | Performed by: RADIOLOGY

## 2021-07-24 DIAGNOSIS — F33.1 MODERATE EPISODE OF RECURRENT MAJOR DEPRESSIVE DISORDER (H): ICD-10-CM

## 2021-07-24 DIAGNOSIS — F41.1 GAD (GENERALIZED ANXIETY DISORDER): ICD-10-CM

## 2021-07-26 ENCOUNTER — E-VISIT (OUTPATIENT)
Dept: FAMILY MEDICINE | Facility: OTHER | Age: 36
End: 2021-07-26
Payer: COMMERCIAL

## 2021-07-26 DIAGNOSIS — F41.1 GAD (GENERALIZED ANXIETY DISORDER): ICD-10-CM

## 2021-07-26 DIAGNOSIS — F33.1 MODERATE EPISODE OF RECURRENT MAJOR DEPRESSIVE DISORDER (H): ICD-10-CM

## 2021-07-26 PROCEDURE — 99422 OL DIG E/M SVC 11-20 MIN: CPT | Performed by: PHYSICIAN ASSISTANT

## 2021-07-26 RX ORDER — SERTRALINE HYDROCHLORIDE 25 MG/1
TABLET, FILM COATED ORAL
Qty: 30 TABLET | Refills: 0 | Status: SHIPPED | OUTPATIENT
Start: 2021-07-26 | End: 2021-07-26

## 2021-07-26 ASSESSMENT — ANXIETY QUESTIONNAIRES
2. NOT BEING ABLE TO STOP OR CONTROL WORRYING: SEVERAL DAYS
7. FEELING AFRAID AS IF SOMETHING AWFUL MIGHT HAPPEN: SEVERAL DAYS
6. BECOMING EASILY ANNOYED OR IRRITABLE: SEVERAL DAYS
5. BEING SO RESTLESS THAT IT IS HARD TO SIT STILL: NOT AT ALL
1. FEELING NERVOUS, ANXIOUS, OR ON EDGE: SEVERAL DAYS
8. IF YOU CHECKED OFF ANY PROBLEMS, HOW DIFFICULT HAVE THESE MADE IT FOR YOU TO DO YOUR WORK, TAKE CARE OF THINGS AT HOME, OR GET ALONG WITH OTHER PEOPLE?: SOMEWHAT DIFFICULT
3. WORRYING TOO MUCH ABOUT DIFFERENT THINGS: SEVERAL DAYS
GAD7 TOTAL SCORE: 6
GAD7 TOTAL SCORE: 6
4. TROUBLE RELAXING: SEVERAL DAYS
7. FEELING AFRAID AS IF SOMETHING AWFUL MIGHT HAPPEN: SEVERAL DAYS
GAD7 TOTAL SCORE: 6

## 2021-07-26 ASSESSMENT — PATIENT HEALTH QUESTIONNAIRE - PHQ9
SUM OF ALL RESPONSES TO PHQ QUESTIONS 1-9: 13
SUM OF ALL RESPONSES TO PHQ QUESTIONS 1-9: 13
10. IF YOU CHECKED OFF ANY PROBLEMS, HOW DIFFICULT HAVE THESE PROBLEMS MADE IT FOR YOU TO DO YOUR WORK, TAKE CARE OF THINGS AT HOME, OR GET ALONG WITH OTHER PEOPLE: SOMEWHAT DIFFICULT

## 2021-07-26 NOTE — TELEPHONE ENCOUNTER
Constance refill sent. Needs recheck. Could do E-visit for this.    Darrell Lee-GORDON Machado  [facility]

## 2021-07-26 NOTE — TELEPHONE ENCOUNTER
Pending Prescriptions:                       Disp   Refills    sertraline (ZOLOFT) 25 MG tablet [Pharmacy*30 tab*1        Sig: TAKE ONE TABLET BY MOUTH ONCE DAILY    Routing refill request to provider for review/approval because:  Labs out of range:  PHQ-9 score:    PHQ 5/24/2021   PHQ-9 Total Score 11   Q9: Thoughts of better off dead/self-harm past 2 weeks Not at all

## 2021-07-27 ASSESSMENT — ANXIETY QUESTIONNAIRES: GAD7 TOTAL SCORE: 6

## 2021-07-27 ASSESSMENT — PATIENT HEALTH QUESTIONNAIRE - PHQ9: SUM OF ALL RESPONSES TO PHQ QUESTIONS 1-9: 13

## 2021-07-28 ENCOUNTER — TELEPHONE (OUTPATIENT)
Dept: FAMILY MEDICINE | Facility: OTHER | Age: 36
End: 2021-07-28

## 2021-07-28 NOTE — TELEPHONE ENCOUNTER
According to the most recent evisit completed on 07/26/21 with Darrell. The patient is suppose to increase her Sertraline to 50 mg.   Pharmacy was informed.       ALY ChingN, RN  [place]  July 28, 2021

## 2021-07-28 NOTE — TELEPHONE ENCOUNTER
sertraline (ZOLOFT) 50 MG tablet  sertraline (ZOLOFT) 25MG tablet    Pharmacy Question: Please verify that patient is supposed to take a 25mg & 50mg tablet

## 2021-08-13 ENCOUNTER — PRENATAL OFFICE VISIT (OUTPATIENT)
Dept: OBGYN | Facility: OTHER | Age: 36
End: 2021-08-13
Payer: COMMERCIAL

## 2021-08-13 VITALS
WEIGHT: 158 LBS | SYSTOLIC BLOOD PRESSURE: 110 MMHG | HEIGHT: 63 IN | DIASTOLIC BLOOD PRESSURE: 76 MMHG | BODY MASS INDEX: 28 KG/M2

## 2021-08-13 DIAGNOSIS — E03.9 ACQUIRED HYPOTHYROIDISM: ICD-10-CM

## 2021-08-13 DIAGNOSIS — R11.0 NAUSEA: ICD-10-CM

## 2021-08-13 DIAGNOSIS — G43.009 MIGRAINE WITHOUT AURA AND WITHOUT STATUS MIGRAINOSUS, NOT INTRACTABLE: ICD-10-CM

## 2021-08-13 DIAGNOSIS — O09.511 AMA (ADVANCED MATERNAL AGE) PRIMIGRAVIDA 35+, FIRST TRIMESTER: Primary | ICD-10-CM

## 2021-08-13 PROBLEM — Z23 NEED FOR TDAP VACCINATION: Status: ACTIVE | Noted: 2021-08-13

## 2021-08-13 LAB
ABO/RH(D): NORMAL
ALBUMIN UR-MCNC: NEGATIVE MG/DL
ANTIBODY SCREEN: NEGATIVE
APPEARANCE UR: CLEAR
BACTERIA #/AREA URNS HPF: NORMAL /HPF
BASOPHILS # BLD AUTO: 0 10E3/UL (ref 0–0.2)
BASOPHILS NFR BLD AUTO: 0 %
BILIRUB UR QL STRIP: NEGATIVE
COLOR UR AUTO: YELLOW
EOSINOPHIL # BLD AUTO: 0 10E3/UL (ref 0–0.7)
EOSINOPHIL NFR BLD AUTO: 0 %
ERYTHROCYTE [DISTWIDTH] IN BLOOD BY AUTOMATED COUNT: 12.2 % (ref 10–15)
GLUCOSE UR STRIP-MCNC: NEGATIVE MG/DL
HCT VFR BLD AUTO: 35.4 % (ref 35–47)
HGB BLD-MCNC: 12.3 G/DL (ref 11.7–15.7)
HGB UR QL STRIP: NEGATIVE
KETONES UR STRIP-MCNC: NEGATIVE MG/DL
LEUKOCYTE ESTERASE UR QL STRIP: NEGATIVE
LYMPHOCYTES # BLD AUTO: 1.8 10E3/UL (ref 0.8–5.3)
LYMPHOCYTES NFR BLD AUTO: 18 %
MCH RBC QN AUTO: 31.1 PG (ref 26.5–33)
MCHC RBC AUTO-ENTMCNC: 34.7 G/DL (ref 31.5–36.5)
MCV RBC AUTO: 89 FL (ref 78–100)
MONOCYTES # BLD AUTO: 0.7 10E3/UL (ref 0–1.3)
MONOCYTES NFR BLD AUTO: 7 %
NEUTROPHILS # BLD AUTO: 7.1 10E3/UL (ref 1.6–8.3)
NEUTROPHILS NFR BLD AUTO: 74 %
NITRATE UR QL: NEGATIVE
PH UR STRIP: 5.5 [PH] (ref 5–7)
PLATELET # BLD AUTO: 265 10E3/UL (ref 150–450)
RBC # BLD AUTO: 3.96 10E6/UL (ref 3.8–5.2)
RBC #/AREA URNS AUTO: NORMAL /HPF
SP GR UR STRIP: 1.02 (ref 1–1.03)
SPECIMEN EXPIRATION DATE: NORMAL
TSH SERPL DL<=0.005 MIU/L-ACNC: 2.09 MU/L (ref 0.4–4)
UROBILINOGEN UR STRIP-ACNC: 0.2 E.U./DL
WBC # BLD AUTO: 9.5 10E3/UL (ref 4–11)
WBC #/AREA URNS AUTO: NORMAL /HPF

## 2021-08-13 PROCEDURE — 99207 PR FIRST OB VISIT: CPT | Performed by: OBSTETRICS & GYNECOLOGY

## 2021-08-13 PROCEDURE — 86762 RUBELLA ANTIBODY: CPT | Performed by: OBSTETRICS & GYNECOLOGY

## 2021-08-13 PROCEDURE — 84443 ASSAY THYROID STIM HORMONE: CPT | Performed by: OBSTETRICS & GYNECOLOGY

## 2021-08-13 PROCEDURE — 86850 RBC ANTIBODY SCREEN: CPT | Performed by: OBSTETRICS & GYNECOLOGY

## 2021-08-13 PROCEDURE — 86803 HEPATITIS C AB TEST: CPT | Performed by: OBSTETRICS & GYNECOLOGY

## 2021-08-13 PROCEDURE — 81001 URINALYSIS AUTO W/SCOPE: CPT | Performed by: OBSTETRICS & GYNECOLOGY

## 2021-08-13 PROCEDURE — 86901 BLOOD TYPING SEROLOGIC RH(D): CPT | Performed by: OBSTETRICS & GYNECOLOGY

## 2021-08-13 PROCEDURE — 87389 HIV-1 AG W/HIV-1&-2 AB AG IA: CPT | Performed by: OBSTETRICS & GYNECOLOGY

## 2021-08-13 PROCEDURE — 86900 BLOOD TYPING SEROLOGIC ABO: CPT | Performed by: OBSTETRICS & GYNECOLOGY

## 2021-08-13 PROCEDURE — 36415 COLL VENOUS BLD VENIPUNCTURE: CPT | Performed by: OBSTETRICS & GYNECOLOGY

## 2021-08-13 PROCEDURE — 87340 HEPATITIS B SURFACE AG IA: CPT | Performed by: OBSTETRICS & GYNECOLOGY

## 2021-08-13 PROCEDURE — 87491 CHLMYD TRACH DNA AMP PROBE: CPT | Performed by: OBSTETRICS & GYNECOLOGY

## 2021-08-13 PROCEDURE — 86780 TREPONEMA PALLIDUM: CPT | Performed by: OBSTETRICS & GYNECOLOGY

## 2021-08-13 PROCEDURE — 87591 N.GONORRHOEAE DNA AMP PROB: CPT | Performed by: OBSTETRICS & GYNECOLOGY

## 2021-08-13 PROCEDURE — 85025 COMPLETE CBC W/AUTO DIFF WBC: CPT | Performed by: OBSTETRICS & GYNECOLOGY

## 2021-08-13 RX ORDER — ONDANSETRON 4 MG/1
4 TABLET, ORALLY DISINTEGRATING ORAL EVERY 8 HOURS PRN
Qty: 90 TABLET | Refills: 1 | Status: SHIPPED | OUTPATIENT
Start: 2021-08-13 | End: 2022-06-01

## 2021-08-13 RX ORDER — RIZATRIPTAN BENZOATE 10 MG/1
TABLET, ORALLY DISINTEGRATING ORAL
COMMUNITY
Start: 2021-07-28 | End: 2023-02-22

## 2021-08-13 ASSESSMENT — MIFFLIN-ST. JEOR: SCORE: 1380.81

## 2021-08-13 NOTE — PROGRESS NOTES
"HPI:    Inessa is a 35 year old yo  at 10 2/7 weeks by LMP and early ultrasound who presents today for her initial OB visit.  Patient is not due for a pap smear today.    Issues: N/V.  Migraines    Past OB Hx: 1 TAB     Father of baby: No health issues       Past Medical History:   Diagnosis Date     History of colposcopy with cervical biopsy 12/10/2019    see problem list     Papanicolaou smear of cervix with low grade squamous intraepithelial lesion (LGSIL) 10/30/2013             Past Surgical History:   Procedure Laterality Date     LEEP TX, CERVICAL  2014        Family History   Problem Relation Age of Onset     Myocardial Infarction Mother      Heart Disease Father 38        Heart Attack     Cerebrovascular Disease Father         \"mild\" X2     Hypertension Father      Hyperlipidemia Father      No Known Problems Maternal Grandmother      Diabetes Maternal Grandfather      Cerebrovascular Disease Paternal Grandmother      Coronary Artery Disease Paternal Grandmother      Myocardial Infarction Paternal Grandfather      No Known Problems Sister      Cancer Maternal Uncle         Social History     Socioeconomic History     Marital status:      Spouse name: Not on file     Number of children: Not on file     Years of education: Not on file     Highest education level: Not on file   Occupational History     Not on file   Tobacco Use     Smoking status: Never Smoker     Smokeless tobacco: Never Used   Vaping Use     Vaping Use: Never used   Substance and Sexual Activity     Alcohol use: Not Currently     Comment: Occasionally     Drug use: No     Sexual activity: Yes     Partners: Male     Birth control/protection: Condom   Other Topics Concern     Parent/sibling w/ CABG, MI or angioplasty before 65F 55M? Yes     Comment: Father 38   Social History Narrative    10/2020  Lives  in [place] with Robert WHITAKER.  Neither of them smokes cigarettes.  No concerns about domestic violence.  No indoor " cats/kittens.  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     Social Determinants of Health     Financial Resource Strain:      Difficulty of Paying Living Expenses:    Food Insecurity:      Worried About Running Out of Food in the Last Year:      Ran Out of Food in the Last Year:    Transportation Needs:      Lack of Transportation (Medical):      Lack of Transportation (Non-Medical):    Physical Activity:      Days of Exercise per Week:      Minutes of Exercise per Session:    Stress:      Feeling of Stress :    Social Connections:      Frequency of Communication with Friends and Family:      Frequency of Social Gatherings with Friends and Family:      Attends [employer] Services:      Active Member of Clubs or Organizations:      Attends Club or Organization Meetings:      Marital Status:    Intimate Partner Violence:      Fear of Current or Ex-Partner:      Emotionally Abused:      Physically Abused:      Sexually Abused:          Current Outpatient Medications:      hydrOXYzine (ATARAX) 25 MG tablet, Take 1-2 tablets (25-50 mg) by mouth 3 times daily as needed for anxiety or other (sleep), Disp: 45 tablet, Rfl: 1     Multiple Vitamin TABS, Take 1 tablet by mouth daily., Disp: , Rfl:      Prenatal Vit-Fe Fumarate-FA (PRENATAL MULTIVITAMIN W/IRON) 27-0.8 MG tablet, Take 1 tablet by mouth daily, Disp: , Rfl:      rizatriptan (MAXALT-MLT) 10 MG ODT, TAKE ONE TABLET AT ONSET OF HEADACHE. MAY REPEAT AFTER 1 OR 2 HOURS. MAX 2 PER DAY AND 9 PER MONTH, Disp: , Rfl:      sertraline (ZOLOFT) 50 MG tablet, Take 1 tablet (50 mg) by mouth daily, Disp: 30 tablet, Rfl: 2     ALPRAZolam (XANAX) 0.5 MG tablet, Take 1 tablet (0.5 mg) by mouth 2 times daily as needed for anxiety *moved to [place], will have another provider start doing refills from there or [place] (Patient not taking: Reported on 8/13/2021), Disp: 30 tablet, Rfl: 2      Objective:  Physical Exam:   Breast:  Benign exam, no masses palpated.  No skin changes, no  axillary lymphadenopathy, no nipple discharge.  Axilla feel completely normal, no lymph node enlargement and non-tender.  Heart=RRR, no murmurs  Thyroid=normal, no masses, no TTP  Lungs=Clear to ascultation bilateral, non-labored breathing  Abd=soft, Nontender/nondistended, +bowel sounds x4  PELVIC:    External genitalia: normal without lesion  Vagina: normal mucosa and rugae, no discharge.  Cervix: normal without lesion, healthy, nulliparous, GC and Chlamydia obtained  Uterus: small, mobile, nontender.  Adnexa: non tender, without masses  Rectal: deffered  Ext=no clubbing or cyanosis  PYLk=[id number]    Assessment:   1.  IUP at 10 2/7 weeks here for her initial OB visit    Plan:    1.  PNV  2.  NOB Labs   3.  Discussed routine prenatal care, quad screen, GCT, anatomy scan at ~19 weeks, frequency of visits.  4.  Discussed first trimester screen and she wants this done.  Referral placed today  5.  Delivery hospital: [facility]  6.  RTC 4 weeks.  7.  Migraines  8.  Anxiety/depression=stable.  Seeing a therapist  9.  Hypothyroidism=TSH today  10.  AMA=level II ultrasound.  Referral placed today  11. Nausa=Zofran ordered      Discussed physician coverage, tertiary support, diet, exercise, weight gain, schedule of visits, routine and indicated ultrasounds, and childbirth education.    Options for  testing for chromosomal and birth defects were discussed with the patient. Diagnostic tests include CVS and Amniocentesis. We discussed that these tests are definitive but invasive and do carry a risk of fetal loss.   Screening tests include nuchal translucency/blood marker testing in the first trimester and quad screening in the second trimester. We discussed that these are screening tests and not diagnostic tests and that false positives and negatives are a distinct possibility.     30 minutes was spent face to face with the patient today discussing her history, diagnosis, and follow-up plan as noted above.  Over 50% of the visit  was spent in counseling and coordination of care.    Discussed aspirin use in pregnancy.  Low-dose aspirin prophylaxis can be beneficial in women at high risk of developing preeclampsia.  I generally recommend we begin aspirin at about 12-13 weeks gestation and continue until at least 36 weeks.    Women with at least one of the following conditions are considered high risk for developing preeclampsia: Previous pregnancy with preeclampsia,  multifetal-gestation, chronic hypertension, diabetes mellitus, chronic kidney disease, autoimmune disease.    Women with more than 1 of the following conditions may also consider low-dose aspirin prophylaxis in pregnancy: Nulliparity, BMI greater than 30, family history of preeclampsia (mother or sister), AMA, socio-demographic characteristics, personal risk factors.      Patient does meet the above criteria.  Discussed risks and benefits of low dose Asprin therapy and she elects to proceed.     Total Visit Time: 30 minutes      Razia Rust DO

## 2021-08-14 LAB — T PALLIDUM AB SER QL: NONREACTIVE

## 2021-08-15 LAB
C TRACH DNA SPEC QL NAA+PROBE: NEGATIVE
N GONORRHOEA DNA SPEC QL NAA+PROBE: NEGATIVE

## 2021-08-16 LAB
HBV SURFACE AG SERPL QL IA: NONREACTIVE
HCV AB SERPL QL IA: NONREACTIVE
HIV 1+2 AB+HIV1 P24 AG SERPL QL IA: NONREACTIVE
RUBV IGG SERPL QL IA: 17.3 INDEX
RUBV IGG SERPL QL IA: POSITIVE

## 2021-08-29 ENCOUNTER — NURSE TRIAGE (OUTPATIENT)
Dept: NURSING | Facility: CLINIC | Age: 36
End: 2021-08-29

## 2021-08-29 NOTE — TELEPHONE ENCOUNTER
Message from [place]:  Original authorizing provider: JUDI Alberto CNP would like a refill of the following medications:  citalopram (CELEXA) 10 MG tablet [JUDI Alberto CNP]    Preferred pharmacy: [facility], [place] - [address] [place] [address]    Comment:     Triage call. Patient calling.    OB Triage Call      Is patient's OB/Midwife with the [facility] or [facility]? [facility]- Proceed with triage     Reason for call: abdominal pain    Assessment: Patient is 13 weeks pregnant.  Having lower right abdominal pain, started this morning.  Had some mild cramping on Thursday and Friday.  Rates pain as 3/10.  Still has her appendix.  Also reports having some spotting on Thursday after sexual intercourse.    Plan: Per protocol, see HCP within 4 hours. Advised to be seen at urgent care.  Patient agrees to plan. Phone number for [place] urgent care given to patient.    Trinity Clifford RN 21 12:19 PM  [facility] Nurse Advisor        12w4d    Estimated Date of Delivery: Mar 9, 2022        OB History    Para Term  AB Living   3 0 0 0 2 0   SAB TAB Ectopic Multiple Live Births   1 0 0 0 0      # Outcome Date GA Lbr Brian/2nd Weight Sex Delivery Anes PTL Lv   3 Current            2 AB 2009    U TAB   FD   1 SAB               Obstetric Comments   FOB is Robert       No results found for: GBS       Trinity Clifford RN 21 12:09 PM  [facility] Nurse Advisor      Reason for Disposition    [1] Constant abdominal pain AND [2] present > 2 hours    Additional Information    Negative: Passed out (i.e., lost consciousness, collapsed and was not responding)    Negative: Shock suspected (e.g., cold/pale/clammy skin, too weak to stand, low BP, rapid pulse)    Negative: Difficult to awaken or acting confused (e.g., disoriented, slurred speech)    Negative: Sounds like a life-threatening emergency to the triager    Negative: MODERATE-SEVERE abdominal pain (e.g., interferes with normal activities, awakens from sleep)    Negative: [1] SEVERE vaginal bleeding (e.g., soaking 2 pads per hour, large blood clots) AND [2] present 2 or more hours    Negative: [1] MODERATE vaginal bleeding (i.e., soaking 1 pad / hour; clots) AND [2] present > 6 hours    Negative: [1]  "MODERATE vaginal bleeding (i.e., soaking 1 pad / hour; clots) AND [2] pregnant > 12 weeks    Negative: Passed tissue (e.g., gray-white)    Negative: [1] Vomiting AND [2] contains red blood or black (\"coffee ground\") material  (Exception: few red streaks in vomit that only happened once)    Negative: Shoulder pain    Negative: Lightheadedness or dizziness (e.g., feels like passing out)    Negative: Patient sounds very sick or weak to the triager    Protocols used: PREGNANCY - ABDOMINAL PAIN LESS THAN 20 WEEKS EGA-A-    COVID 19 Nurse Triage Plan/Patient Instructions    Please be aware that novel coronavirus (COVID-19) may be circulating in the community. If you develop symptoms such as fever, cough, or SOB or if you have concerns about the presence of another infection including coronavirus (COVID-19), please contact your health care provider or visit [email or web address].     Disposition/Instructions    In-Person Visit with provider recommended. Reference Visit Selection Guide.    Thank you for taking steps to prevent the spread of this virus.  o Limit your contact with others.  o Wear a simple mask to cover your cough.  o Wash your hands well and often.    Resources    [facility]: About COVID-19: [email or web address]    CDC: What to Do If You're Sick: www.cdc.gov/coronavirus/2019-ncov/about/steps-when-sick.html    CDC: Ending Home Isolation: www.cdc.gov/coronavirus/2019-ncov/hcp/disposition-in-home-patients.html     CDC: Caring for Someone: www.cdc.gov/coronavirus/2019-ncov/if-you-are-sick/care-for-someone.html     [facility]: Interim Guidance for Hospital Discharge to Home: www.health.[facility].[place].us/diseases/coronavirus/hcp/hospdischarge.pdf    [facility] clinical trials (COVID-19 research studies): [email or web address]/umn-[email or web address]-trials     Below are the COVID-19 hotlines at the [place] Department of Health ([facility]). Interpreters are available.   o For health questions: Call " 965.229.3868 or 1-488.701.9051 (7 a.m. to 7 p.m.)  o For questions about schools and childcare: Call 251-211-7477 or 1-125.402.4984 (7 a.m. to 7 p.m.)

## 2021-09-01 ENCOUNTER — TRANSFERRED RECORDS (OUTPATIENT)
Dept: HEALTH INFORMATION MANAGEMENT | Facility: CLINIC | Age: 36
End: 2021-09-01

## 2021-09-07 ENCOUNTER — TRANSFERRED RECORDS (OUTPATIENT)
Dept: HEALTH INFORMATION MANAGEMENT | Facility: CLINIC | Age: 36
End: 2021-09-07

## 2021-09-24 ENCOUNTER — PRENATAL OFFICE VISIT (OUTPATIENT)
Dept: OBGYN | Facility: OTHER | Age: 36
End: 2021-09-24
Payer: COMMERCIAL

## 2021-09-24 VITALS — BODY MASS INDEX: 29.58 KG/M2 | SYSTOLIC BLOOD PRESSURE: 108 MMHG | DIASTOLIC BLOOD PRESSURE: 72 MMHG | WEIGHT: 167 LBS

## 2021-09-24 DIAGNOSIS — F41.1 GAD (GENERALIZED ANXIETY DISORDER): ICD-10-CM

## 2021-09-24 DIAGNOSIS — G43.009 MIGRAINE WITHOUT AURA AND WITHOUT STATUS MIGRAINOSUS, NOT INTRACTABLE: ICD-10-CM

## 2021-09-24 DIAGNOSIS — K21.00 GASTROESOPHAGEAL REFLUX DISEASE WITH ESOPHAGITIS WITHOUT HEMORRHAGE: ICD-10-CM

## 2021-09-24 DIAGNOSIS — E03.9 ACQUIRED HYPOTHYROIDISM: ICD-10-CM

## 2021-09-24 DIAGNOSIS — O09.512 AMA (ADVANCED MATERNAL AGE) PRIMIGRAVIDA 35+, SECOND TRIMESTER: Primary | ICD-10-CM

## 2021-09-24 DIAGNOSIS — F33.1 MODERATE EPISODE OF RECURRENT MAJOR DEPRESSIVE DISORDER (H): ICD-10-CM

## 2021-09-24 PROCEDURE — 99207 PR PRENATAL VISIT: CPT | Performed by: OBSTETRICS & GYNECOLOGY

## 2021-09-24 NOTE — PROGRESS NOTES
36 year old  at 16w2d weeks presents to the clinic for a routine prenatal visit.  Feeling well.  No vaginal bleeding, leakage of fluid, or contractions   Fundal height=s=d  ZWYo=771  Discussed quad screen and she declines  Migraine=nothing is helping.  We discussed options.  GERD=stable  Anatomy ultrasound scheduled with MFM already  Hypothyroidism=TSH=2.09 on   Anxiety/depression=stable but getting worse  RTC 4 weeks.    Razia Rust, DO

## 2021-09-24 NOTE — PATIENT INSTRUCTIONS
Please call if you any questions.    [address]   [address]  [phone]        Razia Rust,

## 2021-10-06 ENCOUNTER — PATIENT OUTREACH (OUTPATIENT)
Dept: OBGYN | Facility: OTHER | Age: 36
End: 2021-10-06
Payer: COMMERCIAL

## 2021-10-21 DIAGNOSIS — F33.1 MODERATE EPISODE OF RECURRENT MAJOR DEPRESSIVE DISORDER (H): ICD-10-CM

## 2021-10-21 DIAGNOSIS — F41.1 GAD (GENERALIZED ANXIETY DISORDER): ICD-10-CM

## 2021-10-21 RX ORDER — HYDROXYZINE HYDROCHLORIDE 25 MG/1
25-50 TABLET, FILM COATED ORAL 3 TIMES DAILY PRN
Qty: 45 TABLET | Refills: 1 | Status: CANCELLED | OUTPATIENT
Start: 2021-10-21

## 2021-10-21 NOTE — TELEPHONE ENCOUNTER
Pending Prescriptions:                       Disp   Refills    hydrOXYzine (ATARAX) 25 MG tablet          45 tab*1        Sig: Take 1-2 tablets (25-50 mg) by mouth 3 times daily as           needed for anxiety or other (sleep)    Routing refill request to provider for review/approval because:  Drug interaction warning

## 2021-10-23 ENCOUNTER — MYC MEDICAL ADVICE (OUTPATIENT)
Dept: URGENT CARE | Facility: URGENT CARE | Age: 36
End: 2021-10-23

## 2021-10-23 ENCOUNTER — HEALTH MAINTENANCE LETTER (OUTPATIENT)
Age: 36
End: 2021-10-23

## 2021-10-23 DIAGNOSIS — F33.1 MODERATE EPISODE OF RECURRENT MAJOR DEPRESSIVE DISORDER (H): ICD-10-CM

## 2021-10-23 DIAGNOSIS — F41.1 GAD (GENERALIZED ANXIETY DISORDER): ICD-10-CM

## 2021-10-25 NOTE — TELEPHONE ENCOUNTER
JOEL-7 SCORE 3/25/2021 4/8/2021 7/26/2021   Total Score - 14 (moderate anxiety) 6 (mild anxiety)   Total Score 21 14 6       PHQ 4/8/2021 5/24/2021 7/26/2021   PHQ-9 Total Score 12 11 13   Q9: Thoughts of better off dead/self-harm past 2 weeks Not at all Not at all Not at all         Provider E-Visit time total (minutes): 15 min    Darrell Lee-GORDON Machado  [facility]      Protopic Counseling: Patient may experience a mild burning sensation during topical application. Protopic is not approved in children less than 2 years of age. There have been case reports of hematologic and skin malignancies in patients using topical calcineurin inhibitors although causality is questionable.

## 2021-10-26 RX ORDER — HYDROXYZINE HYDROCHLORIDE 25 MG/1
25-50 TABLET, FILM COATED ORAL 3 TIMES DAILY PRN
Qty: 45 TABLET | Refills: 1 | Status: SHIPPED | OUTPATIENT
Start: 2021-10-26 | End: 2022-06-01

## 2021-10-28 ENCOUNTER — MYC MEDICAL ADVICE (OUTPATIENT)
Dept: OBGYN | Facility: OTHER | Age: 36
End: 2021-10-28

## 2021-10-29 ENCOUNTER — PRENATAL OFFICE VISIT (OUTPATIENT)
Dept: OBGYN | Facility: OTHER | Age: 36
End: 2021-10-29
Payer: COMMERCIAL

## 2021-10-29 VITALS — BODY MASS INDEX: 31.53 KG/M2 | DIASTOLIC BLOOD PRESSURE: 62 MMHG | WEIGHT: 178 LBS | SYSTOLIC BLOOD PRESSURE: 110 MMHG

## 2021-10-29 DIAGNOSIS — K21.00 GASTROESOPHAGEAL REFLUX DISEASE WITH ESOPHAGITIS WITHOUT HEMORRHAGE: ICD-10-CM

## 2021-10-29 DIAGNOSIS — G43.009 MIGRAINE WITHOUT AURA AND WITHOUT STATUS MIGRAINOSUS, NOT INTRACTABLE: ICD-10-CM

## 2021-10-29 DIAGNOSIS — O09.512 AMA (ADVANCED MATERNAL AGE) PRIMIGRAVIDA 35+, SECOND TRIMESTER: Primary | ICD-10-CM

## 2021-10-29 DIAGNOSIS — E03.9 ACQUIRED HYPOTHYROIDISM: ICD-10-CM

## 2021-10-29 DIAGNOSIS — F41.1 GAD (GENERALIZED ANXIETY DISORDER): ICD-10-CM

## 2021-10-29 DIAGNOSIS — F33.1 MODERATE EPISODE OF RECURRENT MAJOR DEPRESSIVE DISORDER (H): ICD-10-CM

## 2021-10-29 PROCEDURE — 99207 PR PRENATAL VISIT: CPT | Performed by: OBSTETRICS & GYNECOLOGY

## 2021-10-29 RX ORDER — PANTOPRAZOLE SODIUM 40 MG/1
40 TABLET, DELAYED RELEASE ORAL DAILY
Qty: 90 TABLET | Refills: 1 | Status: SHIPPED | OUTPATIENT
Start: 2021-10-29 | End: 2022-04-21

## 2021-10-29 NOTE — PATIENT INSTRUCTIONS
What to watch out for are: regular contractions every 5 min, vaginal bleeding, decreased fetal movement, or leakage of fluid.  Please call the office or go to L&D if you develop any of these signs and symptoms.      I will see you for your follow up appointment.  Please feel free to call if you have any questions or concerns.      Thanks,  Razia Rust, DO

## 2021-10-29 NOTE — TELEPHONE ENCOUNTER
, 21w2d.    Pt is scheduled to see Dr. Rust today, 10/29, for a prenatal visit.  Her  is coming with to the appt and is unvaccinated and she is wondering if Dr. Rust could discuss her perspective on the immunizations and the importance of him getting them.    Routing to Dr. Rust to see if this is something she is willing to do.    Soraya Vasquez RN

## 2021-10-29 NOTE — PROGRESS NOTES
36 year old  at 21w2d weeks presents to the clinic for a routine prenatal visit.  No concerns.  No leakage of fluid, vaginal bleeding, or contractions   Good fetal movement.  Fundal height: 22cm  FHTs: 147  GERD=getting worse.  Prescription ordered  Migraines=stable  Anxiety/depression=stable   Hypothyroidism=TSH=will check next visit  Normal anatomy ultrasound but heart, profile, nose and not seen well.  Repeat ultrasound next Friday with MFM.  RTC 4 weeks    Razia Rust DO

## 2021-11-01 DIAGNOSIS — F41.1 GAD (GENERALIZED ANXIETY DISORDER): ICD-10-CM

## 2021-11-01 DIAGNOSIS — F33.1 MODERATE EPISODE OF RECURRENT MAJOR DEPRESSIVE DISORDER (H): ICD-10-CM

## 2021-11-03 NOTE — TELEPHONE ENCOUNTER
Pending Prescriptions:                       Disp   Refills    sertraline (ZOLOFT) 50 MG tablet           30 tab*2        Sig: Take 1 tablet (50 mg) by mouth daily    Routing refill request to provider for review/approval because:  Labs out of range:  PHQ-9 score:    PHQ 7/26/2021   PHQ-9 Total Score 13   Q9: Thoughts of better off dead/self-harm past 2 weeks Not at all             22w0d  Currently pregnant

## 2021-11-22 ENCOUNTER — PRENATAL OFFICE VISIT (OUTPATIENT)
Dept: OBGYN | Facility: OTHER | Age: 36
End: 2021-11-22
Payer: COMMERCIAL

## 2021-11-22 ENCOUNTER — MYC MEDICAL ADVICE (OUTPATIENT)
Dept: OBGYN | Facility: CLINIC | Age: 36
End: 2021-11-22

## 2021-11-22 VITALS — DIASTOLIC BLOOD PRESSURE: 70 MMHG | WEIGHT: 182 LBS | BODY MASS INDEX: 32.24 KG/M2 | SYSTOLIC BLOOD PRESSURE: 112 MMHG

## 2021-11-22 DIAGNOSIS — F41.1 GAD (GENERALIZED ANXIETY DISORDER): ICD-10-CM

## 2021-11-22 DIAGNOSIS — D50.9 IRON DEFICIENCY ANEMIA, UNSPECIFIED IRON DEFICIENCY ANEMIA TYPE: ICD-10-CM

## 2021-11-22 DIAGNOSIS — G43.009 MIGRAINE WITHOUT AURA AND WITHOUT STATUS MIGRAINOSUS, NOT INTRACTABLE: ICD-10-CM

## 2021-11-22 DIAGNOSIS — O09.512 AMA (ADVANCED MATERNAL AGE) PRIMIGRAVIDA 35+, SECOND TRIMESTER: Primary | ICD-10-CM

## 2021-11-22 DIAGNOSIS — F33.1 MODERATE EPISODE OF RECURRENT MAJOR DEPRESSIVE DISORDER (H): ICD-10-CM

## 2021-11-22 DIAGNOSIS — K21.00 GASTROESOPHAGEAL REFLUX DISEASE WITH ESOPHAGITIS WITHOUT HEMORRHAGE: ICD-10-CM

## 2021-11-22 DIAGNOSIS — R73.09 ABNORMAL GLUCOSE TOLERANCE TEST: Primary | ICD-10-CM

## 2021-11-22 DIAGNOSIS — E03.9 ACQUIRED HYPOTHYROIDISM: ICD-10-CM

## 2021-11-22 LAB
GLUCOSE 1H P 50 G GLC PO SERPL-MCNC: 155 MG/DL (ref 70–129)
HGB BLD-MCNC: 10.2 G/DL (ref 11.7–15.7)
TSH SERPL DL<=0.005 MIU/L-ACNC: 1.33 MU/L (ref 0.4–4)

## 2021-11-22 PROCEDURE — 99207 PR PRENATAL VISIT: CPT | Performed by: OBSTETRICS & GYNECOLOGY

## 2021-11-22 PROCEDURE — 84443 ASSAY THYROID STIM HORMONE: CPT | Performed by: OBSTETRICS & GYNECOLOGY

## 2021-11-22 PROCEDURE — 36415 COLL VENOUS BLD VENIPUNCTURE: CPT | Performed by: OBSTETRICS & GYNECOLOGY

## 2021-11-22 PROCEDURE — 82950 GLUCOSE TEST: CPT | Performed by: OBSTETRICS & GYNECOLOGY

## 2021-11-22 RX ORDER — FERROUS SULFATE 325(65) MG
325 TABLET ORAL
Qty: 90 TABLET | Refills: 1 | Status: SHIPPED | OUTPATIENT
Start: 2021-11-22 | End: 2022-04-21

## 2021-11-22 NOTE — PROGRESS NOTES
36 year old  at 24w5d weeks presents to the clinic for a routine prenatal visit.  No concerns.  No vaginal bleeding, leakage of fluid, or contractions   Good fetal movement.  Fundal height= 24cm  FHTs= 154  Normal anatomy ultrasound  RTC 4 weeks  GCT at that visit  GERD=stable  Hypothyroidism=TSH today  Anxiety/depression=stable  Migraines=stable  Blood type: A+    Razia Rust DO

## 2021-12-01 ENCOUNTER — LAB (OUTPATIENT)
Dept: LAB | Facility: OTHER | Age: 36
End: 2021-12-01
Payer: COMMERCIAL

## 2021-12-01 DIAGNOSIS — R73.09 ABNORMAL GLUCOSE TOLERANCE TEST: Primary | ICD-10-CM

## 2021-12-01 LAB
GESTATIONAL GTT 1 HR POST DOSE: 173 MG/DL (ref 60–179)
GESTATIONAL GTT 2 HR POST DOSE: 129 MG/DL (ref 60–154)
GESTATIONAL GTT 3 HR POST DOSE: 158 MG/DL (ref 60–139)
GLUCOSE P FAST SERPL-MCNC: 88 MG/DL (ref 60–94)

## 2021-12-01 PROCEDURE — 36415 COLL VENOUS BLD VENIPUNCTURE: CPT

## 2021-12-01 PROCEDURE — 82952 GTT-ADDED SAMPLES: CPT

## 2021-12-01 PROCEDURE — 82951 GLUCOSE TOLERANCE TEST (GTT): CPT

## 2021-12-15 ENCOUNTER — TELEPHONE (OUTPATIENT)
Dept: OBGYN | Facility: CLINIC | Age: 36
End: 2021-12-15
Payer: COMMERCIAL

## 2021-12-15 RX ORDER — BREAST PUMP
1 EACH MISCELLANEOUS DAILY PRN
Qty: 1 EACH | Refills: 0 | Status: SHIPPED | OUTPATIENT
Start: 2021-12-15 | End: 2022-04-20

## 2021-12-15 NOTE — TELEPHONE ENCOUNTER
Trinity from [employer] [facility] called asking for a script for and Electric Breast pump.    Please fax it to [phone]    Lisbeth Garcia CMA  December 15, 2021

## 2021-12-15 NOTE — TELEPHONE ENCOUNTER
Flagging for clinic RN to order, print and fax a breast pump order to [facility] at fax number provider in Amys note.    Soraya Vasquez RN

## 2021-12-18 ENCOUNTER — HEALTH MAINTENANCE LETTER (OUTPATIENT)
Age: 36
End: 2021-12-18

## 2021-12-27 ENCOUNTER — VIRTUAL VISIT (OUTPATIENT)
Dept: OBGYN | Facility: OTHER | Age: 36
End: 2021-12-27
Payer: COMMERCIAL

## 2021-12-27 DIAGNOSIS — O09.512 AMA (ADVANCED MATERNAL AGE) PRIMIGRAVIDA 35+, SECOND TRIMESTER: Primary | ICD-10-CM

## 2021-12-27 DIAGNOSIS — U07.1 INFECTION DUE TO 2019 NOVEL CORONAVIRUS: ICD-10-CM

## 2021-12-27 DIAGNOSIS — D50.9 IRON DEFICIENCY ANEMIA, UNSPECIFIED IRON DEFICIENCY ANEMIA TYPE: ICD-10-CM

## 2021-12-27 DIAGNOSIS — E03.9 ACQUIRED HYPOTHYROIDISM: ICD-10-CM

## 2021-12-27 DIAGNOSIS — G43.009 MIGRAINE WITHOUT AURA AND WITHOUT STATUS MIGRAINOSUS, NOT INTRACTABLE: ICD-10-CM

## 2021-12-27 PROCEDURE — 99207 PR PRENATAL VISIT: CPT | Performed by: OBSTETRICS & GYNECOLOGY

## 2021-12-27 NOTE — PROGRESS NOTES
Inessa is a 36 year old who is being evaluated via a billable telephone visit.      What phone number would you like to be contacted at? [phone]  How would you like to obtain your AVS? Chikis    36 year old  at 29w5d weeks presents for a telephone visit clinic for a routine prenatal visit.  Patient was dx with COVID 2 days ago.  She feels fluish.  Her temp got to 100.8.  She has been taking  Tylenol. She has a cough and sore throat.  We discussed treatment options.  We discussed the monoclonal antibodies and she will think about it and let me know  Patient denies any vaginal bleeding, leakage of fluid, or contractions   Good fetal movement.    Fundal height=telephone visit  FHTs=telephone visit  Carpel tunnel=she is wearing a right wrist brace which is somewhat helping  Hypothyroidism=TSH==1.33  GDS=failed one hour but passed three hour  Hgb=10.2.  Patient is taking additional iron supplements  RTC 2 weeks    Phone call: 10mins  Razia Rust DO

## 2021-12-28 ENCOUNTER — TELEPHONE (OUTPATIENT)
Dept: OBGYN | Facility: CLINIC | Age: 36
End: 2021-12-28
Payer: COMMERCIAL

## 2021-12-28 NOTE — TELEPHONE ENCOUNTER
, 29w6d.      Pt was diagnosed with Covid yesterday.  She started vomiting and having diarrhea this morning.  She states she wasn't able to keep any fluids down this morning but she has been trying to take small sips of water and pedialyte and so far nothing has come back up.    Denies any other concerning symptoms at this time.  I advised to try to drink small sips of water/sports drinks frequently throughout the day to stay hydrated.  If she does want to try to eat something, eat small amounts of bland foods.  If she isn't able to keep anything down for 24 hours then needs to go to the ER.    Pt verbalized understanding and agreed to plan.    Soraya Vasquez RN

## 2022-01-10 PROBLEM — Z98.890 S/P LEEP: Status: ACTIVE | Noted: 2018-04-16

## 2022-01-14 ENCOUNTER — PRENATAL OFFICE VISIT (OUTPATIENT)
Dept: OBGYN | Facility: OTHER | Age: 37
End: 2022-01-14
Payer: COMMERCIAL

## 2022-01-14 VITALS — BODY MASS INDEX: 35.07 KG/M2 | SYSTOLIC BLOOD PRESSURE: 119 MMHG | WEIGHT: 198 LBS | DIASTOLIC BLOOD PRESSURE: 76 MMHG

## 2022-01-14 DIAGNOSIS — Z23 NEED FOR TDAP VACCINATION: ICD-10-CM

## 2022-01-14 DIAGNOSIS — F41.1 GAD (GENERALIZED ANXIETY DISORDER): ICD-10-CM

## 2022-01-14 DIAGNOSIS — G47.09 OTHER INSOMNIA: ICD-10-CM

## 2022-01-14 DIAGNOSIS — E03.9 ACQUIRED HYPOTHYROIDISM: ICD-10-CM

## 2022-01-14 DIAGNOSIS — F33.1 MODERATE EPISODE OF RECURRENT MAJOR DEPRESSIVE DISORDER (H): ICD-10-CM

## 2022-01-14 DIAGNOSIS — O09.513 AMA (ADVANCED MATERNAL AGE) PRIMIGRAVIDA 35+, THIRD TRIMESTER: Primary | ICD-10-CM

## 2022-01-14 DIAGNOSIS — K21.00 GASTROESOPHAGEAL REFLUX DISEASE WITH ESOPHAGITIS WITHOUT HEMORRHAGE: ICD-10-CM

## 2022-01-14 DIAGNOSIS — G43.009 MIGRAINE WITHOUT AURA AND WITHOUT STATUS MIGRAINOSUS, NOT INTRACTABLE: ICD-10-CM

## 2022-01-14 PROCEDURE — 99207 PR PRENATAL VISIT: CPT | Performed by: OBSTETRICS & GYNECOLOGY

## 2022-01-14 NOTE — PROGRESS NOTES
36 year old  at 32w2d weeks presents to the clinic for a routine prenatal visit.    No concerns.  Patient denies any vaginal bleeding, leakage of fluid, or contractions     Good fetal movement  Fundal height=32cm  ZSAp=053  GERD=stable  COVID+=.  Growth ultrasound ordered  Anemia=pt is taking additional iron supplements  Hypothyroidism=TSH==1.33  Migraines=stable  Anxiety/depression=stable  Discussed labor precautions.  RTC 2 weeks    Razia Rust DO

## 2022-01-17 ENCOUNTER — ANCILLARY PROCEDURE (OUTPATIENT)
Dept: ULTRASOUND IMAGING | Facility: OTHER | Age: 37
End: 2022-01-17
Attending: OBSTETRICS & GYNECOLOGY
Payer: COMMERCIAL

## 2022-01-17 PROCEDURE — 76816 OB US FOLLOW-UP PER FETUS: CPT | Performed by: RADIOLOGY

## 2022-01-18 ENCOUNTER — MYC MEDICAL ADVICE (OUTPATIENT)
Dept: OBGYN | Facility: OTHER | Age: 37
End: 2022-01-18
Payer: COMMERCIAL

## 2022-01-19 ENCOUNTER — MEDICAL CORRESPONDENCE (OUTPATIENT)
Dept: HEALTH INFORMATION MANAGEMENT | Facility: CLINIC | Age: 37
End: 2022-01-19
Payer: COMMERCIAL

## 2022-01-19 NOTE — TELEPHONE ENCOUNTER
Pt writing in due to missing Dr. Rust's call regarding her US results from 1/17.  Routing to Dr. Rust.    Soraya Vasquez RN

## 2022-01-19 NOTE — TELEPHONE ENCOUNTER
I had called and left patient a message.  I recommend she follow up with MFM due to the small size of the baby.  The baby's head and femur length are measuring small.  This may be a normal finding however I recommend she follow up with MFM.  Please place the referral for the patient for a growth ultrasound with MFM and the indication would be smaller size on outside ultrasound.  Thanks.    Razia Rust DO

## 2022-01-19 NOTE — TELEPHONE ENCOUNTER
Sent pt [employer] message with Dr. Rust's message below.    Filled out MFM referral as advised by Dr. Rust and faxed, placed in scanning and in faxed folder.    Soraya Vasquez RN

## 2022-01-21 NOTE — TELEPHONE ENCOUNTER
RN called and spoke with NIKOLAY Latham at [facility], see message back to pt with Noa's recommendations/review.    Maureen Uribe RN on 1/21/2022 at 4:12 PM

## 2022-01-30 ENCOUNTER — NURSE TRIAGE (OUTPATIENT)
Dept: NURSING | Facility: CLINIC | Age: 37
End: 2022-01-30
Payer: COMMERCIAL

## 2022-02-05 ENCOUNTER — MYC MEDICAL ADVICE (OUTPATIENT)
Dept: OBGYN | Facility: OTHER | Age: 37
End: 2022-02-05
Payer: COMMERCIAL

## 2022-02-05 DIAGNOSIS — G89.18 ACUTE POST-OPERATIVE PAIN: Primary | ICD-10-CM

## 2022-02-07 RX ORDER — OXYCODONE HYDROCHLORIDE 5 MG/1
5-10 TABLET ORAL EVERY 6 HOURS PRN
Qty: 5 TABLET | Refills: 0 | Status: SHIPPED | OUTPATIENT
Start: 2022-02-07 | End: 2022-04-20

## 2022-02-07 RX ORDER — OXYCODONE HYDROCHLORIDE 5 MG/1
5-10 TABLET ORAL
COMMUNITY
Start: 2022-02-02 | End: 2022-02-07

## 2022-02-07 NOTE — TELEPHONE ENCOUNTER
Please let patient know I sent in a few more pills for her.  If her pain persists, she should be seen in the office.      Razia Rust, DO

## 2022-02-07 NOTE — TELEPHONE ENCOUNTER
"Pt had emergency C section on 1/30/2022. Pt asking for refill of oxycodone.    Pt has been taking tylenol and ibuprofen every 6 hours since running out of oxycodone and is not having relief. Pain primarily located on L) and R) sides of incision, \"burning\" pain, notices more with movement, pain comes and goes, also having cramping and lower back pain.    Pt denies any additional concerns. RN routing to provider to advise on refill request if appropriate.    Maureen Uribe RN on 2/7/2022 at 8:41 AM    "

## 2022-02-10 ENCOUNTER — MYC MEDICAL ADVICE (OUTPATIENT)
Dept: OBGYN | Facility: OTHER | Age: 37
End: 2022-02-10
Payer: COMMERCIAL

## 2022-02-15 NOTE — TELEPHONE ENCOUNTER
I looked at the image and think that the patient can continue to monitor this from home.  It looks ok to me.  Thanks.    Razia Rust, DO

## 2022-02-15 NOTE — TELEPHONE ENCOUNTER
1/30/22 c/section. Postpartum follow up scheduled for 3/14/22.    States yesterday noticed redness and small amount of yellow drainage from area where steri strip came off of her incision (left side of incision).  States not continuing to drain and incision is now dry. Denies odor from drainage.  Unsure if is swollen. Pt did send in a photo for provider review.    Denies fever.    Using ibuprofen and tylenol occasionally/prn for incisional discomfort moreso of left side of incision w/position changes.  RN encouraged moving slowly and breathing deeply with position changes, using ice or heat to incision for comfort prn.          Vaginal bleeding is light for last week. Denies cramping.    Okay to leave detailed message on phone.    Routing to provider to view mychart photo of incision and if pt can continue to home monitor, or if needs to be seen in clinic.    Kristan Garibay, ALYN RN

## 2022-02-15 NOTE — TELEPHONE ENCOUNTER
RN called and spoke with patient, relaying provider notes. Encouraged to contact clinic w/worsening symptoms of drainage, growing redness or swelling at incision site, fever or other concerns.    Patient verbalized understanding and had no further questions.     KEHINDE Pennington RN

## 2022-03-14 ENCOUNTER — PRENATAL OFFICE VISIT (OUTPATIENT)
Dept: OBGYN | Facility: OTHER | Age: 37
End: 2022-03-14
Payer: COMMERCIAL

## 2022-03-14 VITALS — DIASTOLIC BLOOD PRESSURE: 72 MMHG | SYSTOLIC BLOOD PRESSURE: 104 MMHG

## 2022-03-14 DIAGNOSIS — F41.1 GAD (GENERALIZED ANXIETY DISORDER): ICD-10-CM

## 2022-03-14 DIAGNOSIS — Z98.891 HISTORY OF CESAREAN SECTION: ICD-10-CM

## 2022-03-14 PROBLEM — K21.00 GASTROESOPHAGEAL REFLUX DISEASE WITH ESOPHAGITIS WITHOUT HEMORRHAGE: Status: RESOLVED | Noted: 2021-09-24 | Resolved: 2022-03-14

## 2022-03-14 PROBLEM — O09.513 AMA (ADVANCED MATERNAL AGE) PRIMIGRAVIDA 35+, THIRD TRIMESTER: Status: RESOLVED | Noted: 2021-08-13 | Resolved: 2022-03-14

## 2022-03-14 PROBLEM — Z23 NEED FOR TDAP VACCINATION: Status: RESOLVED | Noted: 2021-08-13 | Resolved: 2022-03-14

## 2022-03-14 PROCEDURE — 99213 OFFICE O/P EST LOW 20 MIN: CPT | Mod: 24 | Performed by: OBSTETRICS & GYNECOLOGY

## 2022-03-14 PROCEDURE — 87624 HPV HI-RISK TYP POOLED RSLT: CPT | Performed by: OBSTETRICS & GYNECOLOGY

## 2022-03-14 PROCEDURE — G0145 SCR C/V CYTO,THINLAYER,RESCR: HCPCS | Performed by: OBSTETRICS & GYNECOLOGY

## 2022-03-14 PROCEDURE — 99207 PR POST PARTUM EXAM: CPT | Performed by: OBSTETRICS & GYNECOLOGY

## 2022-03-14 RX ORDER — SERTRALINE HYDROCHLORIDE 100 MG/1
100 TABLET, FILM COATED ORAL DAILY
Qty: 90 TABLET | Refills: 1 | Status: SHIPPED | OUTPATIENT
Start: 2022-03-14 | End: 2022-10-25

## 2022-03-14 ASSESSMENT — ANXIETY QUESTIONNAIRES
GAD7 TOTAL SCORE: 12
2. NOT BEING ABLE TO STOP OR CONTROL WORRYING: MORE THAN HALF THE DAYS
7. FEELING AFRAID AS IF SOMETHING AWFUL MIGHT HAPPEN: MORE THAN HALF THE DAYS
1. FEELING NERVOUS, ANXIOUS, OR ON EDGE: MORE THAN HALF THE DAYS
7. FEELING AFRAID AS IF SOMETHING AWFUL MIGHT HAPPEN: MORE THAN HALF THE DAYS
5. BEING SO RESTLESS THAT IT IS HARD TO SIT STILL: SEVERAL DAYS
GAD7 TOTAL SCORE: 12
6. BECOMING EASILY ANNOYED OR IRRITABLE: SEVERAL DAYS
4. TROUBLE RELAXING: MORE THAN HALF THE DAYS
GAD7 TOTAL SCORE: 12
3. WORRYING TOO MUCH ABOUT DIFFERENT THINGS: MORE THAN HALF THE DAYS

## 2022-03-14 ASSESSMENT — PATIENT HEALTH QUESTIONNAIRE - PHQ9
SUM OF ALL RESPONSES TO PHQ QUESTIONS 1-9: 8
10. IF YOU CHECKED OFF ANY PROBLEMS, HOW DIFFICULT HAVE THESE PROBLEMS MADE IT FOR YOU TO DO YOUR WORK, TAKE CARE OF THINGS AT HOME, OR GET ALONG WITH OTHER PEOPLE: SOMEWHAT DIFFICULT
SUM OF ALL RESPONSES TO PHQ QUESTIONS 1-9: 8

## 2022-03-14 NOTE — PROGRESS NOTES
"Subjective  36 year old non-pregnant female presents today for a postpartum visit.  Patient had a primary c section on 1/30/2022 at 34 weeks secondary to NRFHTs-abruption suspected.  No pain however occasional cramps on the left side of her incision.  No vaginal bleeding.  No problems urinating.  Normal bowel movements.  Patient is bottle feeding breast milk.  No signs and symptoms of ppd.  Patient scored a 8 on the PHQ-9 and a 12 on the JOEL-7.  No thoughts of suicide or infanticide.  She has been on Zoloft and does have a history of anxiety.  She feels it is higher since delivery.  She is wanting an increase of her Zoloft.  We disussed possibly starting Buspar as well but will see how the increase goes first.  Patient is due for a pap smear today.  Patient is wanting to do use condoms for birth control.        ROS: 10 point ROS neg other than the symptoms noted above in the HPI.  Past Medical History:   Diagnosis Date     History of colposcopy with cervical biopsy 12/10/2019    see problem list     Papanicolaou smear of cervix with low grade squamous intraepithelial lesion (LGSIL) 10/30/2013     Past Surgical History:   Procedure Laterality Date     LEEP TX, CERVICAL  07/22/2014     Family History   Problem Relation Age of Onset     Myocardial Infarction Mother      Heart Disease Father 38        Heart Attack     Cerebrovascular Disease Father         \"mild\" X2     Hypertension Father      Hyperlipidemia Father      No Known Problems Maternal Grandmother      Diabetes Maternal Grandfather      Cerebrovascular Disease Paternal Grandmother      Coronary Artery Disease Paternal Grandmother      Myocardial Infarction Paternal Grandfather      No Known Problems Sister      Cancer Maternal Uncle      Social History     Tobacco Use     Smoking status: Never Smoker     Smokeless tobacco: Never Used   Substance Use Topics     Alcohol use: Not Currently     Comment: Occasionally         Objective  Vitals: /72 (BP " Location: Right arm, Cuff Size: Adult Regular)   LMP 06/02/2021   Breastfeeding Yes   BMI= There is no height or weight on file to calculate BMI.         Abd=soft, Nontender/nondistended, incision=healed well  PELVIC:    External genitalia: normal without lesion  Vagina: normal mucosa and rugae, no discharge.  Cervix: normal without lesion, healthy, pap smear obtained  Uterus: small, mobile, nontender.  Adnexa: non tender, without masses  Rectal: deffered  Ext=no clubbing or cyanosis      Assessment  1.)  Post partum visit  2.)  S/p Primary c section on 1/30/2022 secondary to NRFHTs-suspected abruption  3.)  Birth control   4.)  Due for pap smear  5.)  Anxiety       Plan  1.)  Pap smear  2.)  Zoloft increased to 100mg      Razia Rust

## 2022-03-15 ASSESSMENT — ANXIETY QUESTIONNAIRES: GAD7 TOTAL SCORE: 12

## 2022-03-15 ASSESSMENT — PATIENT HEALTH QUESTIONNAIRE - PHQ9: SUM OF ALL RESPONSES TO PHQ QUESTIONS 1-9: 8

## 2022-03-17 LAB
BKR LAB AP GYN ADEQUACY: NORMAL
BKR LAB AP GYN INTERPRETATION: NORMAL
BKR LAB AP HPV REFLEX: NORMAL
BKR LAB AP PREVIOUS ABNL DX: NORMAL
BKR LAB AP PREVIOUS ABNORMAL: NORMAL
PATH REPORT.COMMENTS IMP SPEC: NORMAL
PATH REPORT.COMMENTS IMP SPEC: NORMAL
PATH REPORT.RELEVANT HX SPEC: NORMAL

## 2022-03-18 LAB
HUMAN PAPILLOMA VIRUS 16 DNA: NEGATIVE
HUMAN PAPILLOMA VIRUS 18 DNA: NEGATIVE
HUMAN PAPILLOMA VIRUS FINAL DIAGNOSIS: NORMAL
HUMAN PAPILLOMA VIRUS OTHER HR: NEGATIVE

## 2022-03-21 ENCOUNTER — PATIENT OUTREACH (OUTPATIENT)
Dept: OBGYN | Facility: OTHER | Age: 37
End: 2022-03-21
Payer: COMMERCIAL

## 2022-04-06 ENCOUNTER — MYC MEDICAL ADVICE (OUTPATIENT)
Dept: FAMILY MEDICINE | Facility: OTHER | Age: 37
End: 2022-04-06
Payer: COMMERCIAL

## 2022-04-06 DIAGNOSIS — E03.9 ACQUIRED HYPOTHYROIDISM: Primary | ICD-10-CM

## 2022-04-06 NOTE — TELEPHONE ENCOUNTER
Patient had postpartum follow up on 3/14/22 with DO Nanda.  Routing to her to advise on mychart request to have her thyroid labwork rechecked now that she has had her baby.  Elvia BAIG RN

## 2022-04-08 ENCOUNTER — LAB (OUTPATIENT)
Dept: LAB | Facility: OTHER | Age: 37
End: 2022-04-08
Payer: COMMERCIAL

## 2022-04-08 DIAGNOSIS — E03.9 ACQUIRED HYPOTHYROIDISM: ICD-10-CM

## 2022-04-08 LAB — TSH SERPL DL<=0.005 MIU/L-ACNC: 1.9 MU/L (ref 0.4–4)

## 2022-04-08 PROCEDURE — 36415 COLL VENOUS BLD VENIPUNCTURE: CPT

## 2022-04-08 PROCEDURE — 84443 ASSAY THYROID STIM HORMONE: CPT

## 2022-04-21 ENCOUNTER — VIRTUAL VISIT (OUTPATIENT)
Dept: FAMILY MEDICINE | Facility: OTHER | Age: 37
End: 2022-04-21
Payer: COMMERCIAL

## 2022-04-21 ENCOUNTER — TELEPHONE (OUTPATIENT)
Dept: FAMILY MEDICINE | Facility: OTHER | Age: 37
End: 2022-04-21

## 2022-04-21 DIAGNOSIS — F41.1 GAD (GENERALIZED ANXIETY DISORDER): Primary | ICD-10-CM

## 2022-04-21 DIAGNOSIS — F33.1 MODERATE EPISODE OF RECURRENT MAJOR DEPRESSIVE DISORDER (H): ICD-10-CM

## 2022-04-21 DIAGNOSIS — F90.0 ATTENTION DEFICIT HYPERACTIVITY DISORDER (ADHD), PREDOMINANTLY INATTENTIVE TYPE: ICD-10-CM

## 2022-04-21 PROCEDURE — 99214 OFFICE O/P EST MOD 30 MIN: CPT | Mod: 95 | Performed by: PHYSICIAN ASSISTANT

## 2022-04-21 RX ORDER — LISDEXAMFETAMINE DIMESYLATE 20 MG/1
20 CAPSULE ORAL EVERY MORNING
Qty: 14 CAPSULE | Refills: 0 | Status: SHIPPED | OUTPATIENT
Start: 2022-04-21 | End: 2022-05-12

## 2022-04-21 ASSESSMENT — PAIN SCALES - GENERAL: PAINLEVEL: NO PAIN (0)

## 2022-04-21 NOTE — PROGRESS NOTES
Inessa is a 36 year old who is being evaluated via a billable video visit.      How would you like to obtain your AVS? MyChart  If the video visit is dropped, the invitation should be resent by: Text to cell phone: [phone] - please send message to pt phone for visit  Will anyone else be joining your video visit? No    Video Start Time: 3:26 PM    Assessment & Plan     ICD-10-CM    1. JOEL (generalized anxiety disorder)  F41.1    2. Moderate episode of recurrent major depressive disorder (H)  F33.1    3. Attention deficit hyperactivity disorder (ADHD), predominantly inattentive type - diagnosed in college   F90.0 lisdexamfetamine (VYVANSE) 20 MG capsule     - Patient with history of ADHD, anxiety, and depression diagnosed in college     Failed on Adderall IR and XR   - Tried to just do it on her own for many years, but now thinking her ADHD is causing some of her anxiety and depression  - She reports doing well, has 11 week old baby girl and is on Sertraline 100 mg, this was recently increased by OB/GYN   - Discussed stimulants vs. Non-stimulants  - Agreement to try one more stimulant - Vyvanse     Reviewed use and side effects     Recheck every 2 weeks   - Could try Strattera or Intuiniv if fails on Vyvanse   - Discussed once stable need for Utox and CSA   -  reviewed, no concerns   - May consider going off Sertraline in the future once stable, patient has this as a goal     Review of the result(s) of each unique test - PHQ9 & GAD7   Diagnosis or treatment significantly limited by social determinants of health - None     40 minutes spent on the date of the encounter doing chart review, history and exam, documentation and further activities as noted above    The patient indicates understanding of these issues and agrees with the plan.    Return in about 2 weeks (around 5/5/2022) for Recheck.    GORDON Sampson [facility]   Inessa is a 36 year old  who presents for the following health issues     History of Present Illness       Reason for visit:  Adhd  Symptom onset:  More than a month  Symptom intensity:  Moderate  Symptom progression:  Staying the same  Had these symptoms before:  Yes    She eats 2-3 servings of fruits and vegetables daily.She consumes 1 sweetened beverage(s) daily.She exercises with enough effort to increase her heart rate 30 to 60 minutes per day.  She exercises with enough effort to increase her heart rate 4 days per week.   She is taking medications regularly.     ADHD     Onset: since childhood     Description:   Easily distracted: YES  Short attention span: YES  Trouble following directions: no   Impulsive behavior: YES  Trouble completing tasks: YES    Accompanying Signs & Symptoms:        Change in sleep pattern: has 12 week old, before had a hard time sleeping, anxiety at night   Irritability at certain times of the day: YES  Socially withdrawn: no  Depression symptoms: YES  Anxiety symptoms: YES    History:  Caffeine intake: Small  Loss of appetite: no  Healthy diet: YES  Did you have problems in school or with previous employment: YES, excelled but go bored, employment some issues   Family history of ADHD: no  Have you had an evaluation for ADHD in the past: YES- in college   Do you use alcohol or drugs: YES- Couple drinks a week     Therapies tried: Adderall (concerta) with no relief    Discuss starting ADHD medication, per pt this was already discussed but then she got pregnant and was to wait after pregnancy to change up medications    - Also failed on Adderall, made her feel like she was on speed, nauseated, didn't sleep, tried both immediate release and extended   - Failed in the past on Wellbutrin and Effexor, mainly when coming off them or increasing doses      Also failed on Seroquel for sleep (didn't wake up)   -  Almost 12 week old daughter   - Went through this in the past   - Feeling like anxiety is more ADHD   - Goal  would be to come off anxiety and depression medication   - They though she was maybe bipolar, when started going to therapy they said no it was ADHD    - In therapy, now, going well and helping   - Was diagnosed in college   - No longer breast feeding            PHQ 5/24/2021 7/26/2021 3/14/2022   PHQ-9 Total Score 11 13 8   Q9: Thoughts of better off dead/self-harm past 2 weeks Not at all Not at all Not at all     JOEL-7 SCORE 4/8/2021 7/26/2021 3/14/2022   Total Score 14 (moderate anxiety) 6 (mild anxiety) 12 (moderate anxiety)   Total Score 14 6 12         Review of Systems   Constitutional, HEENT, cardiovascular, pulmonary, gi and gu systems are negative, except as otherwise noted.      Objective       Vitals:  No vitals were obtained today due to virtual visit.    Physical Exam   GENERAL: Healthy, alert and no distress  EYES: Eyes grossly normal to inspection.  No discharge or erythema, or obvious scleral/conjunctival abnormalities.  RESP: No audible wheeze, cough, or visible cyanosis.  No visible retractions or increased work of breathing.    SKIN: Visible skin clear. No significant rash, abnormal pigmentation or lesions.  NEURO: Cranial nerves grossly intact.  Mentation and speech appropriate for age.  PSYCH: Mentation appears normal, affect normal/bright, judgement and insight intact, normal speech and appearance well-groomed.    Diagnostics: none       Video-Visit Details    Type of service:  Video Visit    Video End Time:3:48 PM    Originating Location (pt. Location): Home    Distant Location (provider location):  [facility] - provider off site     Platform used for Video Visit: Karson

## 2022-04-21 NOTE — TELEPHONE ENCOUNTER
Prior Authorization Retail Medication Request    Medication/Dose: lisdexamfetamine (VYVANSE) 20 MG capsule  ICD code (if different than what is on RX):    Previously Tried and Failed:    Rationale:      Insurance Name:    Insurance ID:        Pharmacy Information (if different than what is on RX)  Name:    Phone:

## 2022-04-21 NOTE — LETTER
My Depression Action Plan  Name: Inessa Cardenas   Date of Birth 1985  Date: 4/21/2022    My doctor: Sadia Reagan   My clinic: [address]  [facility] [address]  [phone]          GREEN    ZONE   Good Control    What it looks like:     Things are going generally well. You have normal ups and downs. You may even feel depressed from time to time, but bad moods usually last less than a day.   What you need to do:  1. Continue to care for yourself (see self care plan)  2. Check your depression survival kit and update it as needed  3. Follow your physician s recommendations including any medication.  4. Do not stop taking medication unless you consult with your physician first.           YELLOW         ZONE Getting Worse    What it looks like:     Depression is starting to interfere with your life.     It may be hard to get out of bed; you may be starting to isolate yourself from others.    Symptoms of depression are starting to last most all day and this has happened for several days.     You may have suicidal thoughts but they are not constant.   What you need to do:     1. Call your care team. Your response to treatment will improve if you keep your care team informed of your progress. Yellow periods are signs an adjustment may need to be made.     2. Continue your self-care.  Just get dressed and ready for the day.  Don't give yourself time to talk yourself out of it.    3. Talk to someone in your support network.    4. Open up your Depression Self-Care Plan/Wellness Kit.           RED    ZONE Medical Alert - Get Help    What it looks like:     Depression is seriously interfering with your life.     You may experience these or other symptoms: You can t get out of bed most days, can t work or engage in other necessary activities, you have trouble taking care of basic hygiene, or basic responsibilities, thoughts of suicide  or death that will not go away, self-injurious behavior.     What you need to do:  1. Call your care team and request a same-day appointment. If they are not available (weekends or after hours) call your local crisis line, emergency room or 911.          Depression Self-Care Plan / Wellness Kit    Many people find that medication and therapy are helpful treatments for managing depression. In addition, making small changes to your everyday life can help to boost your mood and improve your wellbeing. Below are some tips for you to consider. Be sure to talk with your medical provider and/or behavioral health consultant if your symptoms are worsening or not improving.     Sleep   Sleep hygiene  means all of the habits that support good, restful sleep. It includes maintaining a consistent bedtime and wake time, using your bedroom only for sleeping or sex, and keeping the bedroom dark and free of distractions like a computer, smartphone, or television.     Develop a Healthy Routine  Maintain good hygiene. Get out of bed in the morning, make your bed, brush your teeth, take a shower, and get dressed. Don t spend too much time viewing media that makes you feel stressed. Find time to relax each day.    Exercise  Get some form of exercise every day. This will help reduce pain and release endorphins, the  feel good  chemicals in your brain. It can be as simple as just going for a walk or doing some gardening, anything that will get you moving.      Diet  Strive to eat healthy foods, including fruits and vegetables. Drink plenty of water. Avoid excessive sugar, caffeine, alcohol, and other mood-altering substances.     Stay Connected with Others  Stay in touch with friends and family members.    Manage Your Mood  Try deep breathing, massage therapy, biofeedback, or meditation. Take part in fun activities when you can. Try to find something to smile about each day.     Psychotherapy  Be open to working with a therapist if your  provider recommends it.     Medication  Be sure to take your medication as prescribed. Most anti-depressants need to be taken every day. It usually takes several weeks for medications to work. Not all medicines work for all people. It is important to follow-up with your provider to make sure you have a treatment plan that is working for you. Do not stop your medication abruptly without first discussing it with your provider.    Crisis Resources   These hotlines are for both adults and children. They and are open 24 hours a day, 7 days a week unless noted otherwise.      National Suicide Prevention Lifeline   4-569-745-AICD (0195)      Crisis Text Line    www.crisistextline.org  Text HOME to 779314 from anywhere in the [place], anytime, about any type of crisis. A live, trained crisis counselor will receive the text and respond quickly.      Daniel Lifeline for LGBTQ Youth  A national crisis intervention and suicide lifeline for LGBTQ youth under 25. Provides a safe place to talk without judgement. Call 1-534.448.1122; text START to 154809 or visit www.thetrevorproject.org to talk to a trained counselor.      For [facility] crisis numbers, visit the [facility] website at:  https://[place].gov/dhs/people-we-serve/adults/health-care/mental-health/resources/crisis-contacts.jsp

## 2022-04-22 ENCOUNTER — TELEPHONE (OUTPATIENT)
Dept: FAMILY MEDICINE | Facility: OTHER | Age: 37
End: 2022-04-22
Payer: COMMERCIAL

## 2022-04-22 NOTE — TELEPHONE ENCOUNTER
Pharmacy called lisdexamfetamine (VYVANSE) 20 MG capsule [[id number]] (Order [id number])  Needs prior auth

## 2022-04-26 DIAGNOSIS — F41.1 GAD (GENERALIZED ANXIETY DISORDER): ICD-10-CM

## 2022-04-26 RX ORDER — SERTRALINE HYDROCHLORIDE 100 MG/1
100 TABLET, FILM COATED ORAL DAILY
Qty: 90 TABLET | Refills: 1 | Status: CANCELLED | OUTPATIENT
Start: 2022-04-26

## 2022-04-26 NOTE — TELEPHONE ENCOUNTER
PA Initiation    Medication: lisdexamfetamine (VYVANSE) 20 MG capsule  Insurance Company: [employer] - Phone [phone] Fax [phone]  Pharmacy Filling the Rx: [facility] [address] [facility] [place] - [address] [address] [place] [address]  Filling Pharmacy Phone: [phone]  Filling Pharmacy Fax: [phone]  Start Date: 4/26/2022      Inessa Cardenas Zurita: [id number] - PA Case ID: [id number]

## 2022-04-26 NOTE — TELEPHONE ENCOUNTER
"Requested Prescriptions   Pending Prescriptions Disp Refills     sertraline (ZOLOFT) 100 MG tablet 90 tablet 1     Sig: Take 1 tablet (100 mg) by mouth daily       SSRIs Protocol Failed - 4/26/2022  7:54 AM        Failed - No positive pregnancy test in last 12 months        Passed - Recent (12 mo) or future (30 days) visit within the authorizing provider's specialty     Patient has had an office visit with the authorizing provider or a provider within the authorizing providers department within the previous 12 mos or has a future within next 30 days. See \"Patient Info\" tab in inbasket, or \"Choose Columns\" in Meds & Orders section of the refill encounter.              Passed - Medication is active on med list        Passed - Patient is age 18 or older        Passed - No active pregnancy on record           Last seen 3/14/22 for routine postpartum visit.    Last prescribed on 3/14/22 for 90 tablets and 1 refill. Rx sent to [facility].      Unable to reach patient via phone.  Left message to call clinic back at [phone] or to check her Elias Borges Urzeda message to find out if needs refill or if RX request was an auto request from [facility] pharmacy.    Kristan Garibay, ALYN RN          "

## 2022-04-26 NOTE — TELEPHONE ENCOUNTER
Prior Authorization Approval    Authorization Effective Date: 3/27/2022  Authorization Expiration Date: 4/26/2023  Medication: lisdexamfetamine (VYVANSE) 20 MG capsule  Approved Dose/Quantity:   Reference #: [id number]   Insurance Company: [employer] - Phone [phone] Fax [phone]  Which Pharmacy is filling the prescription (Not needed for infusion/clinic administered): [facility] [address] [facility], [place] - [address] [address] [place] [address]  Pharmacy Notified: Yes  Patient Notified: Yes

## 2022-04-27 NOTE — TELEPHONE ENCOUNTER
Pt has responded to [employer], waiting for pt confirmation that she does not need a refill at this time.    ALY PenningtonN RN

## 2022-04-27 NOTE — TELEPHONE ENCOUNTER
Pt states she does not need a RX refill at this time.     Closing encounter.    ALY PenningtonN RN

## 2022-05-11 DIAGNOSIS — F41.1 GAD (GENERALIZED ANXIETY DISORDER): ICD-10-CM

## 2022-05-11 RX ORDER — SERTRALINE HYDROCHLORIDE 100 MG/1
100 TABLET, FILM COATED ORAL DAILY
Qty: 90 TABLET | Refills: 1 | Status: CANCELLED | OUTPATIENT
Start: 2022-05-11

## 2022-05-11 NOTE — TELEPHONE ENCOUNTER
"Requested Prescriptions   Pending Prescriptions Disp Refills     sertraline (ZOLOFT) 100 MG tablet 90 tablet 1     Sig: Take 1 tablet (100 mg) by mouth daily       SSRIs Protocol Failed - 5/11/2022  7:11 AM        Failed - No positive pregnancy test in last 12 months        Passed - Recent (12 mo) or future (30 days) visit within the authorizing provider's specialty     Patient has had an office visit with the authorizing provider or a provider within the authorizing providers department within the previous 12 mos or has a future within next 30 days. See \"Patient Info\" tab in inbasket, or \"Choose Columns\" in Meds & Orders section of the refill encounter.              Passed - Medication is active on med list        Passed - Patient is age 18 or older        Passed - No active pregnancy on record           Last seen on 3/14/22 for postpartum care.    Last prescribed on 3/14/22 for 90 tablets and 1 refill.    Pt should have enough medication through 9/14/22, based on last order for 6 mos.    Will send pt [employer] message.    ALY PenningtonN RN            "

## 2022-05-12 ENCOUNTER — VIRTUAL VISIT (OUTPATIENT)
Dept: FAMILY MEDICINE | Facility: OTHER | Age: 37
End: 2022-05-12
Payer: COMMERCIAL

## 2022-05-12 DIAGNOSIS — F33.1 MODERATE EPISODE OF RECURRENT MAJOR DEPRESSIVE DISORDER (H): ICD-10-CM

## 2022-05-12 DIAGNOSIS — F90.0 ATTENTION DEFICIT HYPERACTIVITY DISORDER (ADHD), PREDOMINANTLY INATTENTIVE TYPE: Primary | ICD-10-CM

## 2022-05-12 DIAGNOSIS — F41.1 GAD (GENERALIZED ANXIETY DISORDER): ICD-10-CM

## 2022-05-12 PROCEDURE — 99214 OFFICE O/P EST MOD 30 MIN: CPT | Mod: 95 | Performed by: PHYSICIAN ASSISTANT

## 2022-05-12 RX ORDER — LISDEXAMFETAMINE DIMESYLATE 20 MG/1
20 CAPSULE ORAL EVERY MORNING
Qty: 14 CAPSULE | Refills: 0 | Status: SHIPPED | OUTPATIENT
Start: 2022-05-12 | End: 2022-06-01

## 2022-05-12 ASSESSMENT — PAIN SCALES - GENERAL: PAINLEVEL: NO PAIN (0)

## 2022-05-12 NOTE — PROGRESS NOTES
Inessa is a 36 year old who is being evaluated via a billable video visit.      How would you like to obtain your AVS? MyChart  If the video visit is dropped, the invitation should be resent by: Text to cell phone: [phone]  Will anyone else be joining your video visit? No    Video Start Time: 11:24 AM    Assessment & Plan     ICD-10-CM    1. Attention deficit hyperactivity disorder (ADHD), predominantly inattentive type - diagnosed in college   F90.0 lisdexamfetamine (VYVANSE) 20 MG capsule   2. JOEL (generalized anxiety disorder)  F41.1    3. Moderate episode of recurrent major depressive disorder (H)  F33.1        - Patient with history of ADHD, anxiety, and depression diagnosed in college     Failed on Adderall IR and XR   - She reports doing well, has 15 week old baby girl and is on Sertraline 100 mg, this was recently increased by OB/GYN   - Last visit we started Vyvanse 20 mg      She reports possibly having headaches from it but they have improved lately, fair control of symptoms      No other side effects  - Wishes to try this for another 2 weeks     Agree to plan      Reviewed use and side effects      Recheck every 2 weeks   - Could try Strattera or Intuiniv if fails on Vyvanse   - Discussed once stable need for Utox and CSA   -  reviewed, no concerns   - May consider going off Sertraline in the future once stable, patient has this as a goal       Review of the result(s) of each unique test - None   Diagnosis or treatment significantly limited by social determinants of health - None     20 minutes spent on the date of the encounter doing chart review, history and exam, documentation and further activities as noted above    The patient indicates understanding of these issues and agrees with the plan.    Return in about 2 weeks (around 5/26/2022).    GORDON Sampson [facility]   Inessa is a 36 year old who presents for the following  health issues     HPI   Inessa is here today to recheck ADHD/ADD.    Updates since last visit: Doing ok     Routine for taking medicine, including time: First thing in AM   Time medicine wears off: Doesn't feel it   Issues at school: NA   Issues at home: No   Control of symptoms: fair     Side effects:  Headaches: YES, first week   Stomach aches: No  Irritability/mood swings: No  Difficulties with sleep: No  Social withdrawal: No  Unusual movements/tics: No  Decreased appetite: No    Other concerns:   - Took awhile to get meds  - Has 4 left   - Can tell difference   - Can't take coffee with them, sick to stomach   - Sleeping well   - Not jittery   - Wishes to stay another 2 weeks     - no weight loss since initial 20 lbs after baby, gained 60 lbs   - Eating well   - Exercising and walking every day   - Doesn't feel stress   - Baby is 15 weeks   - Was doing nutrisystem, tracking calories for 6 weeks   - Got thyroid checked, was normal         Review of Systems   Constitutional, HEENT, cardiovascular, pulmonary, gi and gu systems are negative, except as otherwise noted.      Objective           Vitals:  No vitals were obtained today due to virtual visit.    Physical Exam   GENERAL: Healthy, alert and no distress  EYES: Eyes grossly normal to inspection.  No discharge or erythema, or obvious scleral/conjunctival abnormalities.  RESP: No audible wheeze, cough, or visible cyanosis.  No visible retractions or increased work of breathing.    SKIN: Visible skin clear. No significant rash, abnormal pigmentation or lesions.  NEURO: Cranial nerves grossly intact.  Mentation and speech appropriate for age.  PSYCH: Mentation appears normal, affect normal/bright, judgement and insight intact, normal speech and appearance well-groomed.    Diagnostics: none         Video-Visit Details    Type of service:  Video Visit    Video End Time:11:33 AM    Originating Location (pt. Location): Home    Distant Location (provider location):   [facility]     Platform used for Video Visit: Karson

## 2022-05-13 NOTE — TELEPHONE ENCOUNTER
Pt has not read her Bnooki message.    Unable to reach patient via phone.  Left message to call clinic back at [phone] or to check her Floqq message.    ALY PenningtonN RN

## 2022-05-16 NOTE — TELEPHONE ENCOUNTER
RN closing encounter, pt should have refills remaining.    Maureen Uribe RN on 5/16/2022 at 10:07 AM

## 2022-06-01 ENCOUNTER — VIRTUAL VISIT (OUTPATIENT)
Dept: FAMILY MEDICINE | Facility: OTHER | Age: 37
End: 2022-06-01
Payer: COMMERCIAL

## 2022-06-01 DIAGNOSIS — F90.0 ATTENTION DEFICIT HYPERACTIVITY DISORDER (ADHD), PREDOMINANTLY INATTENTIVE TYPE: Primary | ICD-10-CM

## 2022-06-01 PROCEDURE — 99214 OFFICE O/P EST MOD 30 MIN: CPT | Mod: 95 | Performed by: PHYSICIAN ASSISTANT

## 2022-06-01 RX ORDER — LISDEXAMFETAMINE DIMESYLATE 20 MG/1
20 CAPSULE ORAL EVERY MORNING
Qty: 30 CAPSULE | Refills: 0 | Status: SHIPPED | OUTPATIENT
Start: 2022-06-01 | End: 2022-07-01

## 2022-06-01 ASSESSMENT — PAIN SCALES - GENERAL: PAINLEVEL: NO PAIN (0)

## 2022-06-01 NOTE — PROGRESS NOTES
Inessa is a 36 year old who is being evaluated via a billable video visit.      How would you like to obtain your AVS? MyChart  If the video visit is dropped, the invitation should be resent by: Text to cell phone: [phone]   Will anyone else be joining your video visit? No    Video Start Time: 9:24 AM    Assessment & Plan     ICD-10-CM    1. Attention deficit hyperactivity disorder (ADHD), predominantly inattentive type - diagnosed in college  F90.0 lisdexamfetamine (VYVANSE) 20 MG capsule       - Patient with history of ADHD, anxiety, and depression diagnosed in college     Failed on Adderall IR and XR   - She reports doing well, has 4 month old baby girl and is on Sertraline 100 mg, this was started and increased by OB/GYN provider    - We restarted Vyvanse 20 mg     She had headaches but these have resolved      No other side effects      Reviewed use and side effects      Patient wishes to continue on this for a few more weeks as will be going back to work soon   - Could try Strattera or Intuiniv if fails on Vyvanse   - Discussed once stable need for Utox and CSA   -  reviewed, no concerns   - May consider going off Sertraline in the future once stable, patient has this as a goal     - No changes  - Recheck 2-4 weeks       Review of the result(s) of each unique test - None   Diagnosis or treatment significantly limited by social determinants of health - None     30 minutes spent on the date of the encounter doing chart review, history and exam, documentation and further activities as noted above    Return in about 1 month (around 7/1/2022).    GORDON Sampson [facility]   Inessa is a 36 year old who presents for the following health issues     HPI   Inessa is here today to recheck ADHD/ADD.    Updates since last visit: OK, feeling like kind of wearing off around late afternoon/supper time   But not noticeable all of a sudden like  Adderall did   - Headaches and nausea went away     Routine for taking medicine, including time: in AM   Time medicine wears off: late afternoon around 4pm   Issues at work: none  Issues at home: none  Control of symptoms: yes     Side effects:  Headaches: No  Stomach aches: No  Irritability/mood swings: No  Difficulties with sleep: No  Social withdrawal: No  Unusual movements/tics: No  Decreased appetite: No    Other concerns: none    - Kind of joining back at work   - Going back on Monday to work after baby      Only 4 days through summer       2 days and mom 2 days      Until fall when can go to    - Still doing therapy, going well          Review of Systems   Constitutional, HEENT, cardiovascular, pulmonary, gi and gu systems are negative, except as otherwise noted.      Objective           Vitals:  No vitals were obtained today due to virtual visit.    Physical Exam   GENERAL: Healthy, alert and no distress  EYES: Eyes grossly normal to inspection.  No discharge or erythema, or obvious scleral/conjunctival abnormalities.  RESP: No audible wheeze, cough, or visible cyanosis.  No visible retractions or increased work of breathing.    SKIN: Visible skin clear. No significant rash, abnormal pigmentation or lesions.  NEURO: Cranial nerves grossly intact.  Mentation and speech appropriate for age.  PSYCH: Mentation appears normal, affect normal/bright, judgement and insight intact, normal speech and appearance well-groomed.    Diagnostics: none         Video-Visit Details    Type of service:  Video Visit    Video End Time:9:34 AM    Originating Location (pt. Location): Home    Distant Location (provider location):  [facility] - provider off site     Platform used for Video Visit: Karson

## 2022-07-01 ENCOUNTER — MYC REFILL (OUTPATIENT)
Dept: FAMILY MEDICINE | Facility: OTHER | Age: 37
End: 2022-07-01

## 2022-07-01 DIAGNOSIS — F90.0 ATTENTION DEFICIT HYPERACTIVITY DISORDER (ADHD), PREDOMINANTLY INATTENTIVE TYPE: ICD-10-CM

## 2022-07-01 RX ORDER — LISDEXAMFETAMINE DIMESYLATE 20 MG/1
20 CAPSULE ORAL EVERY MORNING
Qty: 30 CAPSULE | Refills: 0 | Status: SHIPPED | OUTPATIENT
Start: 2022-07-01 | End: 2022-07-26

## 2022-07-01 NOTE — TELEPHONE ENCOUNTER
Pending Prescriptions:                       Disp   Refills    lisdexamfetamine (VYVANSE) 20 MG capsule   30 cap*0        Sig: Take 1 capsule (20 mg) by mouth every morning    Routing refill request to provider for review/approval because:  Drug not on the [facility] refill protocol

## 2022-07-01 NOTE — TELEPHONE ENCOUNTER
reviewed. Due for routine fill. E-prescribed.    Darrell Reagan PA-C  [facility]

## 2022-07-26 ENCOUNTER — VIRTUAL VISIT (OUTPATIENT)
Dept: FAMILY MEDICINE | Facility: OTHER | Age: 37
End: 2022-07-26
Payer: COMMERCIAL

## 2022-07-26 DIAGNOSIS — F90.0 ATTENTION DEFICIT HYPERACTIVITY DISORDER (ADHD), PREDOMINANTLY INATTENTIVE TYPE: Primary | ICD-10-CM

## 2022-07-26 DIAGNOSIS — F41.1 GAD (GENERALIZED ANXIETY DISORDER): ICD-10-CM

## 2022-07-26 DIAGNOSIS — F33.1 MODERATE EPISODE OF RECURRENT MAJOR DEPRESSIVE DISORDER (H): ICD-10-CM

## 2022-07-26 PROCEDURE — 99214 OFFICE O/P EST MOD 30 MIN: CPT | Mod: 95 | Performed by: PHYSICIAN ASSISTANT

## 2022-07-26 RX ORDER — ALPRAZOLAM 0.25 MG
0.25 TABLET ORAL 3 TIMES DAILY PRN
COMMUNITY
End: 2022-07-26

## 2022-07-26 RX ORDER — ALPRAZOLAM 0.25 MG
0.25 TABLET ORAL 3 TIMES DAILY PRN
Qty: 6 TABLET | Refills: 0 | Status: SHIPPED | OUTPATIENT
Start: 2022-07-26 | End: 2023-02-22

## 2022-07-26 RX ORDER — LISDEXAMFETAMINE DIMESYLATE 30 MG/1
30 CAPSULE ORAL EVERY MORNING
Qty: 30 CAPSULE | Refills: 0 | Status: SHIPPED | OUTPATIENT
Start: 2022-08-05 | End: 2022-08-23

## 2022-07-26 ASSESSMENT — PATIENT HEALTH QUESTIONNAIRE - PHQ9
10. IF YOU CHECKED OFF ANY PROBLEMS, HOW DIFFICULT HAVE THESE PROBLEMS MADE IT FOR YOU TO DO YOUR WORK, TAKE CARE OF THINGS AT HOME, OR GET ALONG WITH OTHER PEOPLE: SOMEWHAT DIFFICULT
SUM OF ALL RESPONSES TO PHQ QUESTIONS 1-9: 5
SUM OF ALL RESPONSES TO PHQ QUESTIONS 1-9: 5

## 2022-07-26 ASSESSMENT — PAIN SCALES - GENERAL: PAINLEVEL: NO PAIN (0)

## 2022-07-26 NOTE — PROGRESS NOTES
Inessa is a 36 year old who is being evaluated via a billable video visit.      How would you like to obtain your AVS? MyChart  If the video visit is dropped, the invitation should be resent by: Text to cell phone: [phone]   Will anyone else be joining your video visit? No      Assessment & Plan     ICD-10-CM    1. Attention deficit hyperactivity disorder (ADHD), predominantly inattentive type - diagnosed in college  F90.0 lisdexamfetamine (VYVANSE) 30 MG capsule   2. JOEL (generalized anxiety disorder)  F41.1 ALPRAZolam (XANAX) 0.25 MG tablet   3. Moderate episode of recurrent major depressive disorder (H)  F33.1      - Patient with history of ADHD, anxiety, and depression diagnosed in college     Failed on Adderall IR and XR   - She reports doing well, has 6 month old baby girl and is on Sertraline 100 mg, this was started and increased by OB/GYN provider     - Previously, we restarted Vyvanse 20 mg    She has tried this for awhile now, she has found worked well in the beginning but is now wearing off earlier than she would expect      Recommend increase to 30 mg      Reviewed use and side effects   - Could try Strattera or Intuiniv if fails on Vyvanse   - Discussed once stable need for Utox and CSA   -  reviewed, no concerns   - May consider going off Sertraline in the future once stable, patient has this as a goal     - Recheck 2-4 weeks       Review of the result(s) of each unique test - None   Diagnosis or treatment significantly limited by social determinants of health - none     16 minutes spent on the date of the encounter doing chart review, history and exam, documentation and further activities as noted above    The patient indicates understanding of these issues and agrees with the plan.    Follow up: 2-4 weeks     GORDON Sampson [facility]   Inessa is a 36 year old, presenting for the following health  issues:  CARROLL      History of Present Illness       Reason for visit:  ADHD MEDS    She eats 2-3 servings of fruits and vegetables daily.She consumes 1 sweetened beverage(s) daily.She exercises with enough effort to increase her heart rate 30 to 60 minutes per day.  She exercises with enough effort to increase her heart rate 4 days per week.   She is taking medications regularly.    Today's PHQ-9         PHQ-9 Total Score: 5    PHQ-9 Q9 Thoughts of better off dead/self-harm past 2 weeks :   Not at all    How difficult have these problems made it for you to do your work, take care of things at home, or get along with other people: Somewhat difficult       Inessa is here today to recheck ADHD/ADD.    Updates since last visit: feeling better    Routine for taking medicine, including time: 7am   Time medicine wears off: 2 or 3pm  Issues at school: none  Issues at home: none  Control of symptoms: yes on medication    Side effects:  Headaches: No  Stomach aches: No  Irritability/mood swings: No  Difficulties with sleep: Yes alittle bit  Social withdrawal: No  Unusual movements/tics: No  Decreased appetite: no    Other concerns: eating less/ snaking hx of eating disorder     - History of eating disorder - binge eating   - Back at work now   - Noticing 2-3 pm wears off, not dramatic   - Not the same as first 2 months     - Xanax for travel. Has upcoming trip to [employer] and couple day trips to [place], all for work       Review of Systems   Constitutional, HEENT, cardiovascular, pulmonary, gi and gu systems are negative, except as otherwise noted.      Objective       Vitals:  No vitals were obtained today due to virtual visit.    Physical Exam   GENERAL: Healthy, alert and no distress  EYES: Eyes grossly normal to inspection.  No discharge or erythema, or obvious scleral/conjunctival abnormalities.  RESP: No audible wheeze, cough, or visible cyanosis.  No visible retractions or increased work of breathing.    SKIN:  Visible skin clear. No significant rash, abnormal pigmentation or lesions.  NEURO: Cranial nerves grossly intact.  Mentation and speech appropriate for age.  PSYCH: Mentation appears normal, affect normal/bright, judgement and insight intact, normal speech and appearance well-groomed.    Diagnostics: reviewed in Epic         Video-Visit Details    Video Start Time: 5:21 PM    Type of service:  Video Visit    Video End Time:5:31 PM    Originating Location (pt. Location): Home    Distant Location (provider location):  [facility] - provider off site     Platform used for Video Visit: Ubiregi

## 2022-08-23 ENCOUNTER — VIRTUAL VISIT (OUTPATIENT)
Dept: FAMILY MEDICINE | Facility: OTHER | Age: 37
End: 2022-08-23
Payer: COMMERCIAL

## 2022-08-23 DIAGNOSIS — F90.0 ATTENTION DEFICIT HYPERACTIVITY DISORDER (ADHD), PREDOMINANTLY INATTENTIVE TYPE: Primary | ICD-10-CM

## 2022-08-23 PROCEDURE — 99213 OFFICE O/P EST LOW 20 MIN: CPT | Mod: 95 | Performed by: PHYSICIAN ASSISTANT

## 2022-08-23 RX ORDER — LISDEXAMFETAMINE DIMESYLATE 30 MG/1
30 CAPSULE ORAL EVERY MORNING
Qty: 30 CAPSULE | Refills: 0 | Status: SHIPPED | OUTPATIENT
Start: 2022-09-10 | End: 2022-10-26

## 2022-08-23 NOTE — PROGRESS NOTES
Inessa is a 36 year old who is being evaluated via a billable video visit.      How would you like to obtain your AVS? MyChart  If the video visit is dropped, the invitation should be resent by: Text to cell phone: [phone]  Will anyone else be joining your video visit? No    Assessment & Plan     ICD-10-CM    1. Attention deficit hyperactivity disorder (ADHD), predominantly inattentive type - diagnosed in college  F90.0 lisdexamfetamine (VYVANSE) 30 MG capsule       - Patient with history of ADHD, anxiety, and depression diagnosed in college     Failed on Adderall IR and XR   - She reports doing well, has 6 month old baby girl and is on Sertraline 100 mg, this was started and increased by OB/GYN provider     - Previously, we restarted Vyvanse 20 mg, she has tried this for awhile, she has found worked well in the beginning but is now wearing off earlier than she would expect, last visit increased to 30 mg   - She reports this has gone well with minimal side effects, wishes to continue     Reviewed use and side effects   - Could try Strattera or Intuiniv if fails on Vyvanse   - Discussed once stable need for Utox and CSA   -  reviewed, no concerns   - May consider going off Sertraline in the future once stable, patient has this as a goal        Review of the result(s) of each unique test - None  15 minutes spent on the date of the encounter doing chart review, history and exam, documentation and further activities per the not    The patient indicates understanding of these issues and agrees with the plan.    Return in about 6 weeks (around 10/4/2022) for Recheck.    Sadia Reagan PA-C  [facility]   Inessa is a 36 year old, presenting for the following health issues:  A.D.H.D      History of Present Illness       Reason for visit:  Adhd    She eats 2-3 servings of fruits and vegetables daily.She consumes 1 sweetened beverage(s) daily.   She is taking  medications regularly.         Inessa is here today to recheck ADHD/ADD.    Updates since last visit: things going well, maybe a little bit harder to fall asleep (nap time)     Routine for taking medicine, including time: First thing in AM   Time medicine wears off: no  Issues at school: NA   Issues at home: none   Control of symptoms: good     Side effects:  Headaches: No  Stomach aches: No  Irritability/mood swings: No  Difficulties with sleep: Yes, sort of, earlier the better   Social withdrawal: No  Unusual movements/tics: No  Decreased appetite: No    Other concerns: none, has hand foot and mouth right now             Review of Systems   Constitutional, HEENT, cardiovascular, pulmonary, gi and gu systems are negative, except as otherwise noted.      Objective       Vitals:  No vitals were obtained today due to virtual visit.    Physical Exam   GENERAL: Healthy, alert and no distress  EYES: Eyes grossly normal to inspection.  No discharge or erythema, or obvious scleral/conjunctival abnormalities.  RESP: No audible wheeze, cough, or visible cyanosis.  No visible retractions or increased work of breathing.    SKIN: Visible skin clear. No significant rash, abnormal pigmentation or lesions.  NEURO: Cranial nerves grossly intact.  Mentation and speech appropriate for age.  PSYCH: Mentation appears normal, affect normal/bright, judgement and insight intact, normal speech and appearance well-groomed.    Diagnostics: none         Video-Visit Details    Video Start Time: 5:18 PM    Type of service:  Video Visit    Video End Time:5:22 PM    Originating Location (pt. Location): Home    Distant Location (provider location):  [facility]     Platform used for Video Visit: [employer]

## 2022-09-26 ENCOUNTER — MYC REFILL (OUTPATIENT)
Dept: FAMILY MEDICINE | Facility: OTHER | Age: 37
End: 2022-09-26

## 2022-09-26 ENCOUNTER — MYC MEDICAL ADVICE (OUTPATIENT)
Dept: FAMILY MEDICINE | Facility: OTHER | Age: 37
End: 2022-09-26

## 2022-09-26 DIAGNOSIS — F90.0 ATTENTION DEFICIT HYPERACTIVITY DISORDER (ADHD), PREDOMINANTLY INATTENTIVE TYPE: ICD-10-CM

## 2022-09-26 RX ORDER — LISDEXAMFETAMINE DIMESYLATE 30 MG/1
30 CAPSULE ORAL EVERY MORNING
Qty: 30 CAPSULE | Refills: 0 | OUTPATIENT
Start: 2022-09-26

## 2022-10-09 ENCOUNTER — HEALTH MAINTENANCE LETTER (OUTPATIENT)
Age: 37
End: 2022-10-09

## 2022-10-25 DIAGNOSIS — F41.1 GAD (GENERALIZED ANXIETY DISORDER): ICD-10-CM

## 2022-10-25 RX ORDER — SERTRALINE HYDROCHLORIDE 100 MG/1
TABLET, FILM COATED ORAL
Qty: 90 TABLET | Refills: 1 | Status: SHIPPED | OUTPATIENT
Start: 2022-10-25 | End: 2023-02-22

## 2022-10-25 ASSESSMENT — ANXIETY QUESTIONNAIRES
8. IF YOU CHECKED OFF ANY PROBLEMS, HOW DIFFICULT HAVE THESE MADE IT FOR YOU TO DO YOUR WORK, TAKE CARE OF THINGS AT HOME, OR GET ALONG WITH OTHER PEOPLE?: SOMEWHAT DIFFICULT
2. NOT BEING ABLE TO STOP OR CONTROL WORRYING: SEVERAL DAYS
7. FEELING AFRAID AS IF SOMETHING AWFUL MIGHT HAPPEN: SEVERAL DAYS
IF YOU CHECKED OFF ANY PROBLEMS ON THIS QUESTIONNAIRE, HOW DIFFICULT HAVE THESE PROBLEMS MADE IT FOR YOU TO DO YOUR WORK, TAKE CARE OF THINGS AT HOME, OR GET ALONG WITH OTHER PEOPLE: SOMEWHAT DIFFICULT
1. FEELING NERVOUS, ANXIOUS, OR ON EDGE: SEVERAL DAYS
7. FEELING AFRAID AS IF SOMETHING AWFUL MIGHT HAPPEN: SEVERAL DAYS
3. WORRYING TOO MUCH ABOUT DIFFERENT THINGS: SEVERAL DAYS
GAD7 TOTAL SCORE: 7
GAD7 TOTAL SCORE: 7
5. BEING SO RESTLESS THAT IT IS HARD TO SIT STILL: SEVERAL DAYS
6. BECOMING EASILY ANNOYED OR IRRITABLE: SEVERAL DAYS
GAD7 TOTAL SCORE: 7
4. TROUBLE RELAXING: SEVERAL DAYS

## 2022-10-25 ASSESSMENT — PATIENT HEALTH QUESTIONNAIRE - PHQ9
SUM OF ALL RESPONSES TO PHQ QUESTIONS 1-9: 5
SUM OF ALL RESPONSES TO PHQ QUESTIONS 1-9: 5
10. IF YOU CHECKED OFF ANY PROBLEMS, HOW DIFFICULT HAVE THESE PROBLEMS MADE IT FOR YOU TO DO YOUR WORK, TAKE CARE OF THINGS AT HOME, OR GET ALONG WITH OTHER PEOPLE: SOMEWHAT DIFFICULT

## 2022-10-25 NOTE — TELEPHONE ENCOUNTER
"Requested Prescriptions   Pending Prescriptions Disp Refills     sertraline (ZOLOFT) 100 MG tablet [Pharmacy Med Name: SERTRALINE HCL 100MG TABS] 90 tablet 1     Sig: TAKE ONE TABLET BY MOUTH ONCE DAILY       SSRIs Protocol Passed - 10/25/2022  5:06 AM        Passed - Recent (12 mo) or future (30 days) visit within the authorizing provider's specialty     Patient has had an office visit with the authorizing provider or a provider within the authorizing providers department within the previous 12 mos or has a future within next 30 days. See \"Patient Info\" tab in inbasket, or \"Choose Columns\" in Meds & Orders section of the refill encounter.              Passed - Medication is active on med list        Passed - Patient is age 18 or older        Passed - No active pregnancy on record        Passed - No positive pregnancy test in last 12 months             Pt last seen 3/14/2022 for PP    Last prescribed 3/14/2022 for 90 tablets with 1 refill    Prescription approved per [facility] Refill Protocol.    Maureen Uribe RN on 10/25/2022 at 8:37 AM    "

## 2022-10-25 NOTE — PROGRESS NOTES
Inessa is a 37 year old who is being evaluated via a billable video visit.      How would you like to obtain your AVS? MyChart  If the video visit is dropped, the invitation should be resent by: Text to cell phone: [phone]  Will anyone else be joining your video visit? No      Assessment & Plan       ICD-10-CM    1. Attention deficit hyperactivity disorder (ADHD), predominantly inattentive type - diagnosed in college  F90.0 lisdexamfetamine (VYVANSE) 30 MG capsule     DISCONTINUED: lisdexamfetamine (VYVANSE) 30 MG capsule     DISCONTINUED: lisdexamfetamine (VYVANSE) 30 MG capsule      2. JOEL (generalized anxiety disorder)  F41.1       3. Moderate episode of recurrent major depressive disorder (H)  F33.1           - Patient with history of ADHD, anxiety, and depression diagnosed in college     Failed on Adderall IR and XR    - Previously, we restarted Vyvanse and increased to 30 mg   - She reports this has gone well with minimal side effects, wishes to continue     Reviewed use and side effects   - Could try Strattera or Intuiniv if fails on Vyvanse   - Discussed once stable need for Utox and CSA   -  reviewed, no concerns   - May consider going off Sertraline in the future once stable, patient has this as a goal but for now liking how she is feeling       Review of the result(s) of each unique test - PHQ9 & GAD7    30 minutes spent on the date of the encounter doing chart review, history and exam, documentation and further activities per the note    The patient indicates understanding of these issues and agrees with the plan.    Return in about 3 months (around 1/26/2023) for Recheck.    PA student as below was present and participated in shadow capacity only    REJI Santiago-S2  [facility]     ZEFERINO SampsonC  [facility]   Inessa is a 37 year old, presenting for the following health issues:  Anxiety, Depression, and  CARROLL      History of Present Illness       Mental Health Follow-up:  Patient presents to follow-up on Depression & Anxiety.Patient's depression since last visit has been:  Medium  The patient is not having other symptoms associated with depression.  Patient's anxiety since last visit has been:  Medium  The patient is not having other symptoms associated with anxiety.  Any significant life events: No  Patient is not feeling anxious or having panic attacks.  Patient has no concerns about alcohol or drug use.    Reason for visit:  ADHD meds    She eats 2-3 servings of fruits and vegetables daily.She consumes 1 sweetened beverage(s) daily.She exercises with enough effort to increase her heart rate 20 to 29 minutes per day.  She exercises with enough effort to increase her heart rate 4 days per week.   She is taking medications regularly.    Today's PHQ-9         PHQ-9 Total Score: 5    PHQ-9 Q9 Thoughts of better off dead/self-harm past 2 weeks :   Not at all    How difficult have these problems made it for you to do your work, take care of things at home, or get along with other people: Somewhat difficult  Today's JOEL-7 Score: 7      Inessa is here today to recheck ADHD/ADD.    Updates since last visit: good     Routine for taking medicine, including time: AM   Time medicine wears off: not really   Issues at school: NA   Issues at home: none   Control of symptoms: Fair/good     Side effects:  Headaches: No  Stomach aches: No  Irritability/mood swings: No  Difficulties with sleep: No  Decreased appetite: No    Other concerns:     - ADHD good, meds good, no changes desired   - Takes as early as can   - 3-4x.week works with  on exercise and diet         PHQ 7/26/2022 10/4/2022 10/25/2022   PHQ-9 Total Score 5 6 5   Q9: Thoughts of better off dead/self-harm past 2 weeks Not at all Not at all Not at all     JOEL-7 SCORE 3/14/2022 10/4/2022 10/25/2022   Total Score 12 (moderate anxiety) 7 (mild anxiety) 7  (mild anxiety)   Total Score 12 7 7           Review of Systems   Constitutional, HEENT, cardiovascular, pulmonary, gi and gu systems are negative, except as otherwise noted.      Objective       Vitals:  No vitals were obtained today due to virtual visit.    Physical Exam   GENERAL: Healthy, alert and no distress  EYES: Eyes grossly normal to inspection.  No discharge or erythema, or obvious scleral/conjunctival abnormalities.  RESP: No audible wheeze, cough, or visible cyanosis.  No visible retractions or increased work of breathing.    SKIN: Visible skin clear. No significant rash, abnormal pigmentation or lesions.  NEURO: Cranial nerves grossly intact.  Mentation and speech appropriate for age.  PSYCH: Mentation appears normal, affect normal/bright, judgement and insight intact, normal speech and appearance well-groomed.    Diagnostics; none         Video-Visit Details    Video Start Time: 8:45 AM    Type of service:  Video Visit    Video End Time:8:51 AM    Originating Location (pt. Location): Home    Distant Location (provider location):  Off-site    Platform used for Video Visit: [employer]

## 2022-10-26 ENCOUNTER — VIRTUAL VISIT (OUTPATIENT)
Dept: FAMILY MEDICINE | Facility: OTHER | Age: 37
End: 2022-10-26
Payer: COMMERCIAL

## 2022-10-26 DIAGNOSIS — F41.1 GAD (GENERALIZED ANXIETY DISORDER): ICD-10-CM

## 2022-10-26 DIAGNOSIS — F90.0 ATTENTION DEFICIT HYPERACTIVITY DISORDER (ADHD), PREDOMINANTLY INATTENTIVE TYPE: Primary | ICD-10-CM

## 2022-10-26 DIAGNOSIS — F33.1 MODERATE EPISODE OF RECURRENT MAJOR DEPRESSIVE DISORDER (H): ICD-10-CM

## 2022-10-26 PROCEDURE — 99214 OFFICE O/P EST MOD 30 MIN: CPT | Mod: 95 | Performed by: PHYSICIAN ASSISTANT

## 2022-10-26 RX ORDER — LISDEXAMFETAMINE DIMESYLATE 30 MG/1
30 CAPSULE ORAL EVERY MORNING
Qty: 30 CAPSULE | Refills: 0 | Status: SHIPPED | OUTPATIENT
Start: 2022-11-25 | End: 2022-10-26

## 2022-10-26 RX ORDER — LISDEXAMFETAMINE DIMESYLATE 30 MG/1
30 CAPSULE ORAL EVERY MORNING
Qty: 30 CAPSULE | Refills: 0 | Status: SHIPPED | OUTPATIENT
Start: 2022-12-24 | End: 2022-12-15

## 2022-10-26 RX ORDER — LISDEXAMFETAMINE DIMESYLATE 30 MG/1
30 CAPSULE ORAL EVERY MORNING
Qty: 30 CAPSULE | Refills: 0 | Status: SHIPPED | OUTPATIENT
Start: 2022-10-26 | End: 2022-10-26

## 2022-12-02 ENCOUNTER — E-VISIT (OUTPATIENT)
Dept: URGENT CARE | Facility: CLINIC | Age: 37
End: 2022-12-02
Payer: COMMERCIAL

## 2022-12-02 DIAGNOSIS — J06.9 VIRAL URI: Primary | ICD-10-CM

## 2022-12-02 PROCEDURE — 99421 OL DIG E/M SVC 5-10 MIN: CPT | Performed by: NURSE PRACTITIONER

## 2022-12-03 NOTE — PATIENT INSTRUCTIONS
The symptoms you describe suggest a viral cause, which is much more common than a bacterial cause. Antibiotics will treat bacterial infections, but have no effect on viral infections. If possible, especially if improving, start with symptom care for the first 7-10 days, then consider seeking further treatment or taking an antibiotic.  Inessa  Sounds like you have the flu. This is a viral illness that lasts up to 10 days. Rest, fluids and over the counter products can help with symptoms. Bacterial sinus infections take time to develop.   Sorry you are not feeling well, but you should start to feel you are turning the corner on your illness in a day or two.   You can certainly come into urgent care should you want to be evaluated in person.

## 2022-12-14 ASSESSMENT — ANXIETY QUESTIONNAIRES
6. BECOMING EASILY ANNOYED OR IRRITABLE: SEVERAL DAYS
4. TROUBLE RELAXING: SEVERAL DAYS
7. FEELING AFRAID AS IF SOMETHING AWFUL MIGHT HAPPEN: SEVERAL DAYS
1. FEELING NERVOUS, ANXIOUS, OR ON EDGE: SEVERAL DAYS
2. NOT BEING ABLE TO STOP OR CONTROL WORRYING: SEVERAL DAYS
8. IF YOU CHECKED OFF ANY PROBLEMS, HOW DIFFICULT HAVE THESE MADE IT FOR YOU TO DO YOUR WORK, TAKE CARE OF THINGS AT HOME, OR GET ALONG WITH OTHER PEOPLE?: SOMEWHAT DIFFICULT
GAD7 TOTAL SCORE: 7
7. FEELING AFRAID AS IF SOMETHING AWFUL MIGHT HAPPEN: SEVERAL DAYS
5. BEING SO RESTLESS THAT IT IS HARD TO SIT STILL: SEVERAL DAYS
IF YOU CHECKED OFF ANY PROBLEMS ON THIS QUESTIONNAIRE, HOW DIFFICULT HAVE THESE PROBLEMS MADE IT FOR YOU TO DO YOUR WORK, TAKE CARE OF THINGS AT HOME, OR GET ALONG WITH OTHER PEOPLE: SOMEWHAT DIFFICULT
GAD7 TOTAL SCORE: 7
GAD7 TOTAL SCORE: 7
3. WORRYING TOO MUCH ABOUT DIFFERENT THINGS: SEVERAL DAYS

## 2022-12-14 ASSESSMENT — PATIENT HEALTH QUESTIONNAIRE - PHQ9
SUM OF ALL RESPONSES TO PHQ QUESTIONS 1-9: 8
SUM OF ALL RESPONSES TO PHQ QUESTIONS 1-9: 8
10. IF YOU CHECKED OFF ANY PROBLEMS, HOW DIFFICULT HAVE THESE PROBLEMS MADE IT FOR YOU TO DO YOUR WORK, TAKE CARE OF THINGS AT HOME, OR GET ALONG WITH OTHER PEOPLE: SOMEWHAT DIFFICULT

## 2022-12-15 ENCOUNTER — OFFICE VISIT (OUTPATIENT)
Dept: FAMILY MEDICINE | Facility: OTHER | Age: 37
End: 2022-12-15
Payer: COMMERCIAL

## 2022-12-15 VITALS
RESPIRATION RATE: 14 BRPM | TEMPERATURE: 98.5 F | SYSTOLIC BLOOD PRESSURE: 94 MMHG | DIASTOLIC BLOOD PRESSURE: 68 MMHG | HEART RATE: 77 BPM | OXYGEN SATURATION: 99 %

## 2022-12-15 DIAGNOSIS — F90.0 ATTENTION DEFICIT HYPERACTIVITY DISORDER (ADHD), PREDOMINANTLY INATTENTIVE TYPE: Primary | ICD-10-CM

## 2022-12-15 DIAGNOSIS — F41.1 GAD (GENERALIZED ANXIETY DISORDER): ICD-10-CM

## 2022-12-15 DIAGNOSIS — F33.1 MODERATE EPISODE OF RECURRENT MAJOR DEPRESSIVE DISORDER (H): ICD-10-CM

## 2022-12-15 DIAGNOSIS — E66.3 OVER WEIGHT: ICD-10-CM

## 2022-12-15 LAB — CREAT UR-MCNC: 356 MG/DL

## 2022-12-15 PROCEDURE — 80307 DRUG TEST PRSMV CHEM ANLYZR: CPT | Performed by: PHYSICIAN ASSISTANT

## 2022-12-15 PROCEDURE — 99214 OFFICE O/P EST MOD 30 MIN: CPT | Performed by: PHYSICIAN ASSISTANT

## 2022-12-15 RX ORDER — LISDEXAMFETAMINE DIMESYLATE 30 MG/1
30 CAPSULE ORAL EVERY MORNING
Qty: 30 CAPSULE | Refills: 0 | Status: SHIPPED | OUTPATIENT
Start: 2023-02-22 | End: 2023-02-22

## 2022-12-15 RX ORDER — LISDEXAMFETAMINE DIMESYLATE 30 MG/1
30 CAPSULE ORAL EVERY MORNING
Qty: 30 CAPSULE | Refills: 0 | Status: SHIPPED | OUTPATIENT
Start: 2023-01-23 | End: 2022-12-15

## 2022-12-15 ASSESSMENT — PAIN SCALES - GENERAL: PAINLEVEL: MODERATE PAIN (5)

## 2022-12-15 ASSESSMENT — ANXIETY QUESTIONNAIRES: GAD7 TOTAL SCORE: 7

## 2022-12-15 ASSESSMENT — PATIENT HEALTH QUESTIONNAIRE - PHQ9
10. IF YOU CHECKED OFF ANY PROBLEMS, HOW DIFFICULT HAVE THESE PROBLEMS MADE IT FOR YOU TO DO YOUR WORK, TAKE CARE OF THINGS AT HOME, OR GET ALONG WITH OTHER PEOPLE: SOMEWHAT DIFFICULT
SUM OF ALL RESPONSES TO PHQ QUESTIONS 1-9: 8

## 2022-12-15 NOTE — PATIENT INSTRUCTIONS
- Recommend fiber supplement   - Start with 1 of selected product (pills, gummies, tablets, etc.) at night before      Recommend Psyllium Husk Fiber      Increase every 2 weeks as tolerated until soft daily bowel movement

## 2022-12-15 NOTE — LETTER
[facility]  -- Controlled Medication Agreement    12/15/2022   Inessa Cardenas   1985   [id number]       I understand that my provider is prescribing controlled medications to assist me in managing my ADHD.  The risks, benefits, and side effects of these medications have been explained to me and I agree to the following conditions for this type of treatment.    Stimulant Medication Prescribed: Vyvanse     1.  I will take my medications exactly as prescribed and will not change the medication dosage or schedule without my provider's approval.  Refills will not be given if I  runs out early.     2.  I will keep all regular appointments at this clinic.  If there are three or more missed appointments or appointments canceled less than 2 hours before the scheduled time, my medication may be discontinued.    3.  I understand that prescriptions may only be written for one month at a time, and a written prescription is required each month.  Prescriptions cannot be called in or faxed to the pharmacy.    4.  If the prescription is lost or stolen, replacement is at the discretion of my provider.  I understand that this may mean the prescription might not be replaced.    5.  If I am late for scheduled follow up, I understand that I must make an appointment and that another refill is at the discretion of my provider.  This may mean a prescription for only the amount required until the appointment, regardless of prescription co-pay.  For example, if an appointment is made in 1 week, a prescription might only be written for 7 pills.      I understand that if I violate any of the above conditions, my prescription medications and/or treatment may be terminated.  If the violation includes providing controlled substances to anyone other than to whom the medication is prescribed, a report may be made to my child's physician, pharmacy, and other authorities, including the police.    I have read this contract and it  has been explained to me.  I fully understand the consequences of violating this agreement.    _________________________________/______________  Patient signature/Date

## 2022-12-15 NOTE — PROGRESS NOTES
Assessment & Plan     ICD-10-CM    1. Attention deficit hyperactivity disorder (ADHD), predominantly inattentive type - diagnosed in college  F90.0 Drug Confirmation Panel Urine with Creat - lab collect     Drug Confirmation Panel Urine with Creat - lab collect     lisdexamfetamine (VYVANSE) 30 MG capsule     DISCONTINUED: lisdexamfetamine (VYVANSE) 30 MG capsule      2. JOEL (generalized anxiety disorder)  F41.1       3. Moderate episode of recurrent major depressive disorder (H)  F33.1       4. Over weight  E66.3           1. ADHD   - Stable on Vyvanse 30 mg   -  reviewed, no concerns   - CSA discussed and signed today (12/15/22)   - Utox collected today, await results     2 & 3. Mood   - Doing well on Sertraline 100 mg, 10 months post partum  - Stable   - Reviewed medication use and side effects    4. Weight   - Struggling since had baby, working with a  for exercise and diet and lost maybe 2 lbs   - Discussed fiber vs. Diet vs. Medications including Phentermine, Wellbutrin, and GLP-1   - She will keep working with  and try adding in fiber       Review of the result(s) of each unique test - None   Diagnosis or treatment significantly limited by social determinants of health - None     30 minutes spent on the date of the encounter doing chart review, history and exam, documentation and further activities as noted above    The patient indicates understanding of these issues and agrees with the plan.    Return in about 3 months (around 3/15/2023).    GORDON Sampson [facility]      Ekaterina Wylie is a 37 year old, presenting for the following health issues:  Recheck Medication      History of Present Illness       Mental Health Follow-up:  Patient presents to follow-up on Depression & Anxiety.Patient's depression since last visit has been:  No change  The patient is not having other symptoms associated with depression.  Patient's anxiety since  last visit has been:  No change  The patient is not having other symptoms associated with anxiety.  Any significant life events: No  Patient is not feeling anxious or having panic attacks.  Patient has no concerns about alcohol or drug use.    She eats 2-3 servings of fruits and vegetables daily.She consumes 1 sweetened beverage(s) daily.She exercises with enough effort to increase her heart rate 20 to 29 minutes per day.  She exercises with enough effort to increase her heart rate 4 days per week.   She is taking medications regularly.    Today's PHQ-9         PHQ-9 Total Score: 8    PHQ-9 Q9 Thoughts of better off dead/self-harm past 2 weeks :   Not at all    How difficult have these problems made it for you to do your work, take care of things at home, or get along with other people: Somewhat difficult  Today's JOEL-7 Score: 7       Depression and Anxiety Follow-Up    How are you doing with your depression since your last visit? No change    How are you doing with your anxiety since your last visit?  No change    Are you having other symptoms that might be associated with depression or anxiety? No    Have you had a significant life event? No     Do you have any concerns with your use of alcohol or other drugs? No      Inessa is here today to recheck ADHD/ADD.    Updates since last visit: none     Routine for taking medicine, including time: am   Time medicine wears off: no  Issues at school: na  Issues at home: none   Control of symptoms: fair     Side effects:  Headaches: No  Stomach aches: No  Irritability/mood swings: No  Difficulties with sleep: No  Social withdrawal: No  Unusual movements/tics: No  Decreased appetite: No    Other concerns: none     - Not lost weight since May  - Working with  - full for exercise and nutrition plan   - 170 lbs from home scale         Social History     Tobacco Use     Smoking status: Never     Smokeless tobacco: Never   Vaping Use     Vaping Use: Never used    Substance Use Topics     Alcohol use: Not Currently     Comment: Occasionally     Drug use: No     PHQ 10/4/2022 10/25/2022 12/14/2022   PHQ-9 Total Score 6 5 8   Q9: Thoughts of better off dead/self-harm past 2 weeks Not at all Not at all Not at all     JOEL-7 SCORE 10/4/2022 10/25/2022 12/14/2022   Total Score 7 (mild anxiety) 7 (mild anxiety) 7 (mild anxiety)   Total Score 7 7 7     Last PHQ-9 12/14/2022   1.  Little interest or pleasure in doing things 2   2.  Feeling down, depressed, or hopeless 2   3.  Trouble falling or staying asleep, or sleeping too much 2   4.  Feeling tired or having little energy 2   5.  Poor appetite or overeating 0   6.  Feeling bad about yourself 0   7.  Trouble concentrating 0   8.  Moving slowly or restless 0   Q9: Thoughts of better off dead/self-harm past 2 weeks 0   PHQ-9 Total Score 8   Difficulty at work, home, or with people -     JOEL-7  12/14/2022   1. Feeling nervous, anxious, or on edge 1   2. Not being able to stop or control worrying 1   3. Worrying too much about different things 1   4. Trouble relaxing 1   5. Being so restless that it is hard to sit still 1   6. Becoming easily annoyed or irritable 1   7. Feeling afraid, as if something awful might happen 1   JOEL-7 Total Score 7   If you checked any problems, how difficult have they made it for you to do your work, take care of things at home, or get along with other people? Somewhat difficult         Review of Systems   Constitutional, HEENT, cardiovascular, pulmonary, gi and gu systems are negative, except as otherwise noted.      Objective    BP 94/68 (BP Location: Right arm, Patient Position: Sitting, Cuff Size: Adult Large)   Pulse 77   Temp 98.5  F (36.9  C) (Temporal)   Resp 14   LMP 11/30/2022 (Exact Date)   SpO2 99%   There is no height or weight on file to calculate BMI.  Physical Exam   GENERAL APPEARANCE: healthy, alert and no distress  EYES: Eyes grossly normal to inspection, PERRLA, conjunctivae and  sclerae without injection or discharge, EOM intact   RESP: Lungs clear to auscultation - no rales, rhonchi or wheezes    CV: Regular rates and rhythm, normal S1 S2, no S3 or S4, no murmur, click or rub, no peripheral edema and peripheral pulses strong and symmetric bilaterally   MS: No musculoskeletal defects are noted and gait is age appropriate without ataxia   SKIN: No suspicious lesions or rashes, hydration status appears adeuqate with normal skin turgor   PSYCH: Alert and oriented x3; speech- coherent , normal rate and volume; able to articulate logical thoughts, able to abstract reason, no tangential thoughts, no hallucinations or delusions, mentation appears normal, Mood is euthymic. Affect is appropriate for this mood state and bright. Thought content is free of suicidal ideation, hallucinations, and delusions. Dress is adequate and upkept. Eye contact is good during conversation.       Diagnostics: reviewed in Epic, see orders pending in Epic

## 2022-12-19 LAB
AMPHET UR CFM-MCNC: 5440 NG/ML
AMPHET/CREAT UR: 1528 NG/MG {CREAT}

## 2023-02-17 ENCOUNTER — MYC MEDICAL ADVICE (OUTPATIENT)
Dept: FAMILY MEDICINE | Facility: OTHER | Age: 38
End: 2023-02-17
Payer: COMMERCIAL

## 2023-02-17 NOTE — TELEPHONE ENCOUNTER
Last visit 12/15/22     - I sent Rxs dated 12/24/22, 1/23/23, and 2/22/23      shows last fill was 11/1722.    Pharmacy should have 3 rxs on file    Darrell Reagan PA-C  [facility]

## 2023-02-20 NOTE — TELEPHONE ENCOUNTER
"Please call patient to clarify, is it expensive because insurance isn't covering due to \"early fill\" or just in general?     Darrell Lee-GORDON Machado  [facility]      "

## 2023-02-22 ENCOUNTER — VIRTUAL VISIT (OUTPATIENT)
Dept: FAMILY MEDICINE | Facility: OTHER | Age: 38
End: 2023-02-22
Payer: COMMERCIAL

## 2023-02-22 DIAGNOSIS — F33.1 MODERATE EPISODE OF RECURRENT MAJOR DEPRESSIVE DISORDER (H): ICD-10-CM

## 2023-02-22 DIAGNOSIS — E66.3 OVERWEIGHT (BMI 25.0-29.9): ICD-10-CM

## 2023-02-22 DIAGNOSIS — K21.9 GASTROESOPHAGEAL REFLUX DISEASE, UNSPECIFIED WHETHER ESOPHAGITIS PRESENT: ICD-10-CM

## 2023-02-22 DIAGNOSIS — F41.1 GAD (GENERALIZED ANXIETY DISORDER): ICD-10-CM

## 2023-02-22 DIAGNOSIS — F90.0 ATTENTION DEFICIT HYPERACTIVITY DISORDER (ADHD), PREDOMINANTLY INATTENTIVE TYPE: Primary | ICD-10-CM

## 2023-02-22 PROCEDURE — 99214 OFFICE O/P EST MOD 30 MIN: CPT | Mod: VID | Performed by: PHYSICIAN ASSISTANT

## 2023-02-22 RX ORDER — SERTRALINE HYDROCHLORIDE 100 MG/1
100 TABLET, FILM COATED ORAL DAILY
Qty: 90 TABLET | Refills: 3 | Status: SHIPPED | OUTPATIENT
Start: 2023-02-22 | End: 2023-06-07

## 2023-02-22 RX ORDER — LISDEXAMFETAMINE DIMESYLATE 30 MG/1
30 CAPSULE ORAL EVERY MORNING
Qty: 30 CAPSULE | Refills: 0 | Status: SHIPPED | OUTPATIENT
Start: 2023-04-19 | End: 2023-06-07

## 2023-02-22 RX ORDER — PANTOPRAZOLE SODIUM 40 MG/1
40 TABLET, DELAYED RELEASE ORAL DAILY
Qty: 90 TABLET | Refills: 0 | Status: SHIPPED | OUTPATIENT
Start: 2023-02-22 | End: 2023-08-17

## 2023-02-22 RX ORDER — LISDEXAMFETAMINE DIMESYLATE 30 MG/1
30 CAPSULE ORAL EVERY MORNING
Qty: 30 CAPSULE | Refills: 0 | Status: SHIPPED | OUTPATIENT
Start: 2023-03-20 | End: 2023-02-22

## 2023-02-22 ASSESSMENT — ANXIETY QUESTIONNAIRES
8. IF YOU CHECKED OFF ANY PROBLEMS, HOW DIFFICULT HAVE THESE MADE IT FOR YOU TO DO YOUR WORK, TAKE CARE OF THINGS AT HOME, OR GET ALONG WITH OTHER PEOPLE?: SOMEWHAT DIFFICULT
4. TROUBLE RELAXING: MORE THAN HALF THE DAYS
1. FEELING NERVOUS, ANXIOUS, OR ON EDGE: MORE THAN HALF THE DAYS
5. BEING SO RESTLESS THAT IT IS HARD TO SIT STILL: NOT AT ALL
6. BECOMING EASILY ANNOYED OR IRRITABLE: MORE THAN HALF THE DAYS
IF YOU CHECKED OFF ANY PROBLEMS ON THIS QUESTIONNAIRE, HOW DIFFICULT HAVE THESE PROBLEMS MADE IT FOR YOU TO DO YOUR WORK, TAKE CARE OF THINGS AT HOME, OR GET ALONG WITH OTHER PEOPLE: SOMEWHAT DIFFICULT
2. NOT BEING ABLE TO STOP OR CONTROL WORRYING: MORE THAN HALF THE DAYS
3. WORRYING TOO MUCH ABOUT DIFFERENT THINGS: MORE THAN HALF THE DAYS
GAD7 TOTAL SCORE: 12
GAD7 TOTAL SCORE: 12
7. FEELING AFRAID AS IF SOMETHING AWFUL MIGHT HAPPEN: MORE THAN HALF THE DAYS
GAD7 TOTAL SCORE: 12
7. FEELING AFRAID AS IF SOMETHING AWFUL MIGHT HAPPEN: MORE THAN HALF THE DAYS

## 2023-02-22 NOTE — PROGRESS NOTES
Inessa is a 37 year old who is being evaluated via a billable video visit.      How would you like to obtain your AVS? MyChart  If the video visit is dropped, the invitation should be resent by: Text to cell phone: [phone]  Will anyone else be joining your video visit? No      Assessment & Plan     ICD-10-CM    1. Attention deficit hyperactivity disorder (ADHD), predominantly inattentive type - diagnosed in college  F90.0 lisdexamfetamine (VYVANSE) 30 MG capsule     DISCONTINUED: lisdexamfetamine (VYVANSE) 30 MG capsule      2. JOEL (generalized anxiety disorder)  F41.1 sertraline (ZOLOFT) 100 MG tablet      3. Moderate episode of recurrent major depressive disorder (H)  F33.1       4. Overweight (BMI 25.0-29.9)  E66.3 Comprehensive Weight Management     Lipid panel reflex to direct LDL Fasting     Comprehensive metabolic panel (BMP + Alb, Alk Phos, ALT, AST, Total. Bili, TP)     TSH with free T4 reflex      5. Gastroesophageal reflux disease, unspecified whether esophagitis present  K21.9 pantoprazole (PROTONIX) 40 MG EC tablet        1. ADHD   - Stable on Vyvanse 30 mg   -  reviewed, no concerns   - CSA 12/15/22  - Utox 12/15/22 - no concerns      2 & 3. Mood   - Doing well on Sertraline 100 mg  - Stable   - Reviewed medication use and side effects    4. Weight   - Patient reports now 1 year post partum and can't get weight off, did program with diet and exercise and could only get down 3 lbs   - Previously we discussed medication, wants to wait on that   - Will get labs to rule out reversible causes   - Will refer to weight management     5. GERD   - Likely cough if from this since ongoing 10 years and was previously worked up   - GERD really bad during pregnancy, was on Protonix  - Discussed recommend 12 week trial of PPI, then stop medication      If symptoms return or do not improve, then recommend EGD   - Reviewed use and side effects of Protonix including risks of long term use         Review of the  result(s) of each unique test - See list       12/15/22 - Utox   Diagnosis or treatment significantly limited by social determinants of health - None     40 minutes spent on the date of the encounter doing chart review, history and exam, documentation and further activities as noted above    The patient indicates understanding of these issues and agrees with the plan.    Return in about 3 months (around 5/22/2023) for Recheck.    Sadia Reagan PA-C  [facility]      Ekaterina Wylie is a 37 year old, presenting for the following health issues:  A.D.H.D, Recheck Medication, Heartburn, and Weight Check      History of Present Illness       Mental Health Follow-up:  Patient presents to follow-up on Depression & Anxiety.Patient's depression since last visit has been:  Medium  The patient is not having other symptoms associated with depression.  Patient's anxiety since last visit has been:  Better  The patient is not having other symptoms associated with anxiety.  Any significant life events: No  Patient is feeling anxious or having panic attacks.  Patient has no concerns about alcohol or drug use.    Reason for visit:  Follow up with meds. Discuss heartburn, cough and weight    She eats 2-3 servings of fruits and vegetables daily.She consumes 1 sweetened beverage(s) daily.She exercises with enough effort to increase her heart rate 20 to 29 minutes per day.  She exercises with enough effort to increase her heart rate 4 days per week.   She is taking medications regularly.    Today's PHQ-9         PHQ-9 Total Score: 7    PHQ-9 Q9 Thoughts of better off dead/self-harm past 2 weeks :   Not at all    How difficult have these problems made it for you to do your work, take care of things at home, or get along with other people: Somewhat difficult  Today's JOEL-7 Score: 12       Inessa is here today to recheck ADHD/ADD.    Updates since last visit: working well     Routine for  taking medicine, including time: in AM around 9-10 am   Time medicine wears off: 4-5 pm   Issues at school: NA  Issues at home: none   Control of symptoms: Good     Side effects:  Headaches: No  Stomach aches: No  Irritability/mood swings: No  Difficulties with sleep: No  Social withdrawal: No  Unusual movements/tics: No  Decreased appetite: No    Other concerns: Cost is $350, this is deductible     - Weight      Baby 1 year ago, did 12 week program with  and diet, lost 3 lbs      Did 4 week follow up and didn't lose anything      Lizy program      Body fat scan showed 38%      BMI 29          - Chronic cough for 10 years      Heartburn out of control, took medications in the past      Went to ENT in the past and worked up       Heartburn all day every day       Worse at night      Just OTC medications     Daily only when pregnant (Protonix)               PHQ 10/25/2022 12/14/2022 2/22/2023   PHQ-9 Total Score 5 8 7   Q9: Thoughts of better off dead/self-harm past 2 weeks Not at all Not at all Not at all     JOEL-7 SCORE 10/25/2022 12/14/2022 2/22/2023   Total Score 7 (mild anxiety) 7 (mild anxiety) 12 (moderate anxiety)   Total Score 7 7 12             Review of Systems   Constitutional, HEENT, cardiovascular, pulmonary, gi and gu systems are negative, except as otherwise noted.      Objective       Vitals:  No vitals were obtained today due to virtual visit.    Physical Exam   GENERAL: Healthy, alert and no distress  EYES: Eyes grossly normal to inspection.  No discharge or erythema, or obvious scleral/conjunctival abnormalities.  RESP: No audible wheeze, cough, or visible cyanosis.  No visible retractions or increased work of breathing.    SKIN: Visible skin clear. No significant rash, abnormal pigmentation or lesions.  NEURO: Cranial nerves grossly intact.  Mentation and speech appropriate for age.  PSYCH: Mentation appears normal, affect normal/bright, judgement and insight intact, normal speech  and appearance well-groomed.      Diagnostics: reviewed in Epic       Video-Visit Details    Type of service:  Video Visit   Originating Location (pt. Location): Home  Distant Location (provider location):  Off-site  Platform used for Video Visit: [employer]

## 2023-02-23 ENCOUNTER — MYC MEDICAL ADVICE (OUTPATIENT)
Dept: FAMILY MEDICINE | Facility: OTHER | Age: 38
End: 2023-02-23
Payer: COMMERCIAL

## 2023-03-01 ENCOUNTER — PATIENT OUTREACH (OUTPATIENT)
Dept: FAMILY MEDICINE | Facility: OTHER | Age: 38
End: 2023-03-01
Payer: COMMERCIAL

## 2023-03-08 ENCOUNTER — VIRTUAL VISIT (OUTPATIENT)
Dept: FAMILY MEDICINE | Facility: OTHER | Age: 38
End: 2023-03-08
Payer: COMMERCIAL

## 2023-03-08 DIAGNOSIS — E66.3 OVERWEIGHT (BMI 25.0-29.9): Primary | ICD-10-CM

## 2023-03-08 PROCEDURE — 99214 OFFICE O/P EST MOD 30 MIN: CPT | Mod: VID | Performed by: PHYSICIAN ASSISTANT

## 2023-03-08 RX ORDER — TOPIRAMATE 25 MG/1
25 TABLET, FILM COATED ORAL DAILY
Qty: 30 TABLET | Refills: 0 | Status: SHIPPED | OUTPATIENT
Start: 2023-03-08 | End: 2023-03-28

## 2023-03-08 NOTE — PROGRESS NOTES
Inessa is a 37 year old who is being evaluated via a billable video visit.      How would you like to obtain your AVS? MyChart  If the video visit is dropped, the invitation should be resent by: Text to cell phone: [phone]  Will anyone else be joining your video visit? No    Assessment & Plan     ICD-10-CM    1. Overweight (BMI 25.0-29.9)  E66.3 topiramate (TOPAMAX) 25 MG tablet          - Patient reports now 1 year post partum and can't get weight off, did program with diet and exercise and could only get down 3 lbs   - Previously we discussed medications including Topamax, Phentermine, and GLP-1 injections   - Previously, I had ordered labs to rule out reversible causes      Not scheduled   - Scheduled with medical weight management in May      Due to this would like to start medications, reviewed various options including use and side effects   - Will start Topamax once a day      Reviewed use and side effects   - Recheck 1 month       Review of the result(s) of each unique test - None   Diagnosis or treatment significantly limited by social determinants of health - None     30 minutes spent on the date of the encounter doing chart review, history and exam, documentation and further activities as noted above    The patient indicates understanding of these issues and agrees with the plan.    Return in about 1 month (around 4/8/2023).    Sadia Reagan PA-C  [facility]   Inessa is a 37 year old, presenting for the following health issues:  Weight Loss (Weight management options while waiting for appt in May)      History of Present Illness       Reason for visit:  Weight    She eats 2-3 servings of fruits and vegetables daily.She consumes 1 sweetened beverage(s) daily.She exercises with enough effort to increase her heart rate 20 to 29 minutes per day.  She exercises with enough effort to increase her heart rate 4 days per week.   She is taking  medications regularly.     - Looked up all the medications that we talked about   - Was on Wellbutrin in the past - doesn't know why stopped      On Effexor at the same time - hard to come off of   - Joint pains, feels like 3-4 months pregnant       Review of Systems   Constitutional, HEENT, cardiovascular, pulmonary, gi and gu systems are negative, except as otherwise noted.      Objective       Vitals:  No vitals were obtained today due to virtual visit.    Physical Exam   GENERAL: Healthy, alert and no distress  EYES: Eyes grossly normal to inspection.  No discharge or erythema, or obvious scleral/conjunctival abnormalities.  RESP: No audible wheeze, cough, or visible cyanosis.  No visible retractions or increased work of breathing.    SKIN: Visible skin clear. No significant rash, abnormal pigmentation or lesions.  NEURO: Cranial nerves grossly intact.  Mentation and speech appropriate for age.  PSYCH: Mentation appears normal, affect normal/bright, judgement and insight intact, normal speech and appearance well-groomed.    Diagnostics: none       Video-Visit Details    Type of service:  Video Visit   Originating Location (pt. Location): Home  Distant Location (provider location):  Off-site  Platform used for Video Visit: [employer]

## 2023-03-25 ENCOUNTER — HEALTH MAINTENANCE LETTER (OUTPATIENT)
Age: 38
End: 2023-03-25

## 2023-03-28 ENCOUNTER — VIRTUAL VISIT (OUTPATIENT)
Dept: FAMILY MEDICINE | Facility: OTHER | Age: 38
End: 2023-03-28
Payer: COMMERCIAL

## 2023-03-28 DIAGNOSIS — E66.3 OVERWEIGHT (BMI 25.0-29.9): ICD-10-CM

## 2023-03-28 PROCEDURE — 99213 OFFICE O/P EST LOW 20 MIN: CPT | Mod: VID | Performed by: PHYSICIAN ASSISTANT

## 2023-03-28 RX ORDER — TOPIRAMATE 25 MG/1
25 TABLET, FILM COATED ORAL DAILY
Qty: 90 TABLET | Refills: 0 | Status: SHIPPED | OUTPATIENT
Start: 2023-03-28 | End: 2023-06-07

## 2023-04-10 NOTE — TELEPHONE ENCOUNTER
"Spoke with patient. She had a \"pretty substantial amount of bleeding\" overnight. She did not wear a pad, so unable to measure amount. Did not feel good Tuesday and Wednesday, felt cramps and spotting. Thursday night felt worse and worse, \"borderline tears\" with pain, back hurt, nausea, felt hot. \"Felt off and different\" last night. Right before bed had more spotting and cramping Around 2 am had a lot of bleeding, patient described it as her period being held up for 3 days. No dizziness, shortness of breath, or feeling faint. Took pregnancy test 4 days early and all came back positive.     Advised patient to watch for dizziness, shortness of breath, any feeling of faintness to go to the emergency room.     Flagging for provider to review. Patient's appointment scheduled for 11/25/20, would you like her seen sooner?     If need to reach patient, she will be in meetings all morning and stated it was ok to leave detailed message for her.     Carol Salamanca RN BSN    " Xray Knee 4 Views, Left

## 2023-04-19 ENCOUNTER — MYC MEDICAL ADVICE (OUTPATIENT)
Dept: FAMILY MEDICINE | Facility: OTHER | Age: 38
End: 2023-04-19
Payer: COMMERCIAL

## 2023-04-24 ENCOUNTER — MYC MEDICAL ADVICE (OUTPATIENT)
Dept: FAMILY MEDICINE | Facility: OTHER | Age: 38
End: 2023-04-24

## 2023-04-24 ENCOUNTER — LAB (OUTPATIENT)
Dept: LAB | Facility: OTHER | Age: 38
End: 2023-04-24
Payer: COMMERCIAL

## 2023-04-24 DIAGNOSIS — E66.3 OVERWEIGHT (BMI 25.0-29.9): ICD-10-CM

## 2023-04-24 LAB
ALBUMIN SERPL BCG-MCNC: 4.1 G/DL (ref 3.5–5.2)
ALP SERPL-CCNC: 68 U/L (ref 35–104)
ALT SERPL W P-5'-P-CCNC: 16 U/L (ref 10–35)
ANION GAP SERPL CALCULATED.3IONS-SCNC: 9 MMOL/L (ref 7–15)
AST SERPL W P-5'-P-CCNC: 23 U/L (ref 10–35)
BILIRUB SERPL-MCNC: 0.2 MG/DL
BUN SERPL-MCNC: 12.2 MG/DL (ref 6–20)
CALCIUM SERPL-MCNC: 9.3 MG/DL (ref 8.6–10)
CHLORIDE SERPL-SCNC: 105 MMOL/L (ref 98–107)
CHOLEST SERPL-MCNC: 247 MG/DL
CREAT SERPL-MCNC: 0.94 MG/DL (ref 0.51–0.95)
DEPRECATED HCO3 PLAS-SCNC: 25 MMOL/L (ref 22–29)
GFR SERPL CREATININE-BSD FRML MDRD: 80 ML/MIN/1.73M2
GLUCOSE SERPL-MCNC: 101 MG/DL (ref 70–99)
HDLC SERPL-MCNC: 41 MG/DL
LDLC SERPL CALC-MCNC: 172 MG/DL
NONHDLC SERPL-MCNC: 206 MG/DL
POTASSIUM SERPL-SCNC: 3.9 MMOL/L (ref 3.4–5.3)
PROT SERPL-MCNC: 7.1 G/DL (ref 6.4–8.3)
SODIUM SERPL-SCNC: 139 MMOL/L (ref 136–145)
TRIGL SERPL-MCNC: 170 MG/DL
TSH SERPL DL<=0.005 MIU/L-ACNC: 2.12 UIU/ML (ref 0.3–4.2)

## 2023-04-24 PROCEDURE — 36415 COLL VENOUS BLD VENIPUNCTURE: CPT

## 2023-04-24 PROCEDURE — 80061 LIPID PANEL: CPT

## 2023-04-24 PROCEDURE — 80053 COMPREHEN METABOLIC PANEL: CPT

## 2023-04-24 PROCEDURE — 84443 ASSAY THYROID STIM HORMONE: CPT

## 2023-04-24 NOTE — RESULT ENCOUNTER NOTE
Fidelina Wylie    Your results were normal, except cholesterol is high. I recommend a very low fat diet. We should check this every 6 months.    The results are attached for your review.       Drarell Reagan PA-C

## 2023-05-05 NOTE — TELEPHONE ENCOUNTER
FYI to provider - Patient is lost to pap tracking follow-up. Attempts to contact pt have been made per reminder process and there has been no reply and/or no appt scheduled. Contact hx listed below.     10/30/13   LSIL pap, cannot rule out HSIL  5/20/14   [facility] bx: 5 & 7 o'clock ROSELIA 3.   7/22/14   LEEP: ROSELIA 2 with extension to margin. Plan: (Management of Women with biopsy confirmed ROSELIA 2, 3, Plan: cotest at 12 and 24 months. If all are NIL/neg HPV then she may go to  3 yr cotesting x 20 years post LEEP)  2/9/16   NIL pap, + HR HPV (not 16 or 18) in Care Everywhere  2/18/16   [facility] ECC: HPV effect.   6/7/17   LSIL pap, + HR HPV (not 16 or 18) Plan: colp  7/13/17   [facility] bx at 6 o'clock: ROSELIA 1. Plan: cotest in 1 yr.   9/6/18   LSIL pap, + HR HPV 18 and other (no 16). Plan: colp bef 12/6/18 11/6/18 [facility] bx's 4 o'clock: neg, 11 o'clock: Focal koilocytosis. ECC: Focal squamous atypia suggestive of koilocytosis. Plan: cotest in 1 yr.  10/30/19 Pap: ASCUS, +HR HPV type 18. Plan colpo  12/10/19 colp. Bx: WNL. ECC: WNL. Plan: cotest 1 year  10/19/20 NIL pap, Neg HPV. Plan cotest in 1 year due bef 10/19/21.  3/14/22 NIL Pap, Neg HR HPV. Plan: Cotest in 1 year.   3/1/23 Reminder mychart  4/6/23 Reminder call- LM  5/5/23 Lost to follow-up for pap tracking

## 2023-05-12 ENCOUNTER — VIRTUAL VISIT (OUTPATIENT)
Dept: SURGERY | Facility: CLINIC | Age: 38
End: 2023-05-12
Attending: PHYSICIAN ASSISTANT
Payer: COMMERCIAL

## 2023-05-12 VITALS — HEIGHT: 63 IN | WEIGHT: 163 LBS | BODY MASS INDEX: 28.88 KG/M2

## 2023-05-12 DIAGNOSIS — F90.0 ATTENTION DEFICIT HYPERACTIVITY DISORDER (ADHD), PREDOMINANTLY INATTENTIVE TYPE: ICD-10-CM

## 2023-05-12 DIAGNOSIS — R63.2 BINGE EATING: ICD-10-CM

## 2023-05-12 DIAGNOSIS — G43.009 MIGRAINE WITHOUT AURA AND WITHOUT STATUS MIGRAINOSUS, NOT INTRACTABLE: ICD-10-CM

## 2023-05-12 DIAGNOSIS — E66.3 OVERWEIGHT (BMI 25.0-29.9): ICD-10-CM

## 2023-05-12 DIAGNOSIS — D50.9 IRON DEFICIENCY ANEMIA, UNSPECIFIED IRON DEFICIENCY ANEMIA TYPE: ICD-10-CM

## 2023-05-12 DIAGNOSIS — F19.21 DRUG DEPENDENCE, IN REMISSION (H): ICD-10-CM

## 2023-05-12 DIAGNOSIS — G25.81 RESTLESS LEG: Primary | ICD-10-CM

## 2023-05-12 PROCEDURE — 99205 OFFICE O/P NEW HI 60 MIN: CPT | Mod: VID | Performed by: PHYSICIAN ASSISTANT

## 2023-05-12 RX ORDER — RIZATRIPTAN BENZOATE 10 MG/1
10 TABLET ORAL
COMMUNITY
End: 2023-09-06

## 2023-05-12 RX ORDER — NALTREXONE HYDROCHLORIDE 50 MG/1
TABLET, FILM COATED ORAL
Qty: 30 TABLET | Refills: 2 | Status: SHIPPED | OUTPATIENT
Start: 2023-05-12 | End: 2023-10-09

## 2023-05-12 RX ORDER — ALPRAZOLAM 0.25 MG
0.25 TABLET ORAL 3 TIMES DAILY PRN
COMMUNITY
End: 2023-09-06

## 2023-05-12 NOTE — ASSESSMENT & PLAN NOTE
Pt in Vyvanse.  May benefit from an increase in dose.  She will talk to PCP regarding this after ED evaluation.  Will try Naltrexone to help with constant thinking of food.

## 2023-05-12 NOTE — PROGRESS NOTES
"Inessa is a 37 year old who is being evaluated via a billable video visit.      The patient has been notified of following:     \"This video visit will be conducted via a call between you and your physician/provider. We have found that certain health care needs can be provided without the need for an in-person physical exam.  This service lets us provide the care you need with a video conversation.  If a prescription is necessary we can send it directly to your pharmacy.  If lab work is needed we can place an order for that and you can then stop by our lab to have the test done at a later time.    Video visits are billed at different rates depending on your insurance coverage.  Please reach out to your insurance provider with any questions.    If during the course of the call the physician/provider feels a video visit is not appropriate, you will not be charged for this service.\"    Patient has given verbal consent for Video visit? Yes    How would you like to obtain your AVS? MyChart    If the video visit is dropped, the invitation should be resent by: Text to cell phone: [phone]    Will anyone else be joining your video visit? No    I    Video-Visit Details    Type of service:  Video Visit    Video Start Time: 9:35 AM    Video End Time: 10:32 Am    Originating Location (pt. Location): Home    Distant Location (provider location):  [facility] [place]     Platform used for Video Visit: [facility] Consult    PATIENT:  Inessa Cardenas  MRN:         [id number]  :         1985  JAYSHREE:         2023    Dear Sadia Reagan PA-C,      I had the pleasure of seeing your patient, Inessa Cardenas. Full intake/assessment was done to determine barriers to weight loss success and develop a treatment plan. Inessa Cardenas is a 37 year old female interested in treatment of medical problems associated with excess weight. She has a " "height of 5' 3\", a weight of 163 lbs 0 oz, and the calculated Body mass index is 28.87 kg/m .    ASSESSMENT & PLAN:    Problem List Items Addressed This Visit     Migraine without aura and without status migrainosus, not intractable    Relevant Medications    rizatriptan (MAXALT) 10 MG tablet    Drug dependence, in remission (H)    Attention deficit hyperactivity disorder (ADHD), predominantly inattentive type - diagnosed in [facility]    Iron deficiency anemia    Relevant Orders    Ferritin    Overweight (BMI 25.0-29.9)    Relevant Medications    naltrexone (DEPADE/REVIA) 50 MG tablet    Other Relevant Orders    Vitamin D Screen    Binge eating     Pt in [facility]  May benefit from an increase in dose.  She will talk to PCP regarding this after ED evaluation.  Will try Naltrexone to help with constant thinking of food.         Relevant Medications    naltrexone (DEPADE/REVIA) 50 MG tablet    Restless leg - Primary    Relevant Orders    Ferritin    Vitamin D Screen        PROGRAM OVERVIEW  Reviewed options at [facility] including provider visits, dietician and psychological support.  All questions about weight loss program were answered.  Right now, Inessa will see myself in follow up to see that she is able to get into an eating disorder program and to see if Naltrexone is effective.      EATING DISORDER  Pt has symptoms consistent with binge eating disorder with a history of purging.  Currently reports an unhealthy relationship with food.  Discussed importance of treating eating disorder first before concentrating on weight management.  Recommend ED evaluation. Written contacts given for programs given today.     MEDICATIONS:  .We discussed the role of pharmacological agents in the treatment of obesity.   Right now I am less focused on weight loss for Inessa and more focused on helping her work on her relationship with food. We reviewed medication that may assist with weight loss.  Naltrexone was " "prescribed to help with her \"constantly thinking about food\".  Indications, contraindications, risks/benefits, and potential side effects were discussed.  If Naltrexone effective, will then transfer prescribing to PCP while pt in ED program.  Following treatment she is free to follow up with our program.     LABS:  Will get Ferritin since pt has RLS.     AOM Considerations:  Phentermine:  On stimulant, would avoid  Topiramate:  On Topiramate 2 mg.  When she went up to 50 mg she had   GLP-1:    Covered by insurance but not sure with overweight dx.    Naltrexone:  Pt has hx of addiction, fees addicted to food,   Wellbutrin:  - would avoid with history of bulemia  Metformin:  No current DM or PreDM  Contrave: - would avoid with history of bulmia, could decrease seizure threshold  Qsymia: On stimulant, would avoid        PATIENT INSTRUCTIONS:  Labs ordered.  Please call [phone] to set up a lab appt.   Start Naltrexone.   Directions: Take 1/2 tab 1-2 hours prior to biggest cravings or extra hunger for a week.  If tolerating, increase t 1/2 tablet twice daily.   Continue topiramate 25 mg daily.  May increase to 50 mg daily and see if side effects occur again.  If so, back back down to 25 mg  As we discussed during your appointment I would like your to have an eating disorder evaluation.       Follow up: Return to clinic in 3 months to ensure Referral and placement with ED program and to do a med check for naltrexone.     73 minutes spent on the date of the encounter doing chart review, history and exam, review test results, counseling, developing plan of care, documentation, and further activities as noted above.      She has the following co-morbidities:        5/11/2023     9:58 AM   --   I have the following health issues associated with obesity High Cholesterol   I have the following symptoms associated with obesity Depression    Lower Extremity Swelling    Fatigue    Hip Pain         5/11/2023     9:58 AM " "  Referring Provider   Please name the provider who referred you to Medical Weight Management  If you do not know, please answer \"I Don't Know\" Sadia Ortega           5/11/2023     9:58 AM   Weight History   How concerned are you about your weight? Very Concerned   I became overweight As an Adult   The following factors have contributed to my weight gain Mental Health Issues    Change in Schedule    Eating Too Much    Lack of Exercise    Stress    Other   Please list the other factors Pregnancy, postpartum   I have tried the following methods to lose weight Watching Portions or Calories    Exercise    Weight Watchers    Nutrisystem    Medications    Fasting   My lowest weight since age 18 was 128   My highest weight since age 18 was 200   The most weight I have ever lost was (lbs) 25   I have the following family history of obesity/being overweight Many of my relatives are overweight   How has your weight changed over the last year? Gained   How many pounds? 15 months PP and still up 30 pounds    In mid 20's went up from 130's to 160 lbs.  Did meal replacement and lost weight.  Was at 150 lbs.  Got into strength training and fwlt strong was going to gym all the time and is was working well.  Dropped body fat and was at 125 lb.   Covid hit.  Was bout 140's.  Got pregnant gained sig weight at end of pregnancy was at 190 lbs.  Lost to 160's and plateaued for last 6 months.     Is tired all the time.  Has depression and anxiety since age 16.  Constantly tires and has a lack of motivation.  Has a hx of binging and purging.  Continue to binge 3-4 times a month (mainly around cycle) but has not purged in several years.  Thinks about food all day long.  Feels she has an unhealthy relationship with food. Feels addicted to food.    Her pregnancy \"sucked\". Is not sure she is going to have a second pregnancy.  If she does she wants to do her best to get her body is the best shape for a pregnancy.  From week 6 of pregnancy " migraines were debilitating- couldn't work.  At week 10 RLS was unbearable- inturrupted sleep  Was so edematous.  Got Covid after Erendira 2021.  Was violently ill-vomiting and had plecental detachment and was born at 34 weeks.              5/11/2023     9:58 AM   Diet Recall Review with Patient   If you do eat breakfast, what types of food do you eat? Wake up 7 am   9:00-9:30 am Protein shake, toast, banana, peanut butter    Snacks throughout the day until dinner     If you do eat lunch, what types of food do you typically eat? 1/ 2 [place] with chips or rice cakes.    Or possibly a fruit.      Snacks may be yogurt,  Grazing)   If you do eat supper, what types of food do you typically eat? Eats at 9-10 PM at night.  Protein and veggies  Doesn't sleep well due to this.      Meal boxes.  (is not a meat gal)   likes to cook.     If you do snack, what types of food do you typically eat? Protein bars, yogurt, crackers,   How many glasses of juice do you drink in a typical day? 1   How many of glasses of milk do you drink in a typical day? 0   How many 8oz glasses of sugar containing drinks such as Sukhjinder-Aid/sweet tea do you drink in a day? 0   How many cans/bottles of sugar pop/soda/tea/sports drinks do you drink in a day? 0   How many cans/bottles of diet pop/soda/tea or sports drink do you drink in a day? 1   How often do you have a drink of alcohol? 2-3 TImes a Week   If you do drink, how many drinks might you have in a day? 1 or 2   Is always thinking about food.      Cannot keep snack food in the house of she will snack on theses.  Does like protein bars but not sure if they are the best.    When she struggles: she hates reheated protein.    Can be successful on any meal plan but she does not do well with restriction.  Gets angry.  Can't sustain this.              5/11/2023     9:58 AM   Eating Habits   Generally, my meals include foods like these bread, pasta, rice, potatoes, corn, crackers, sweet dessert,  pop, or juice Almost Everyday   Generally, my meals include foods like these fried meats, brats, burgers, french fries, pizza, cheese, chips, or ice cream A Few Times a Week   Eat fast food (like McDonalds, Burger Tung, Taco Bell) Less Than Weekly   Eat at a buffet or sit-down restaurant Less Than Weekly   Eat most of my meals in front of the TV or computer Once a Week   Often skip meals, eat at random times, have no regular eating times A Few Times a Week   Rarely sit down for a meal but snack or graze throughout Almost Everyday   Eat extra snacks between meals Everyday   Eat most of my food at the end of the day A Few Times a Week   Eat in the middle of the night or wake up at night to eat Never   Eat extra snacks to prevent or correct low blood sugar Never   Eat to prevent acid reflux or stomach pain Never   Worry about not having enough food to eat Never   I eat when I am depressed A Few Times a Week   I eat when I am stressed A Few Times a Week   I eat when I am bored A Few Times a Week   I eat when I am anxious A Few Times a Week   I eat when I am happy or as a reward A Few Times a Week   I feel hungry all the time even if I just have eaten A Few Times a Week   Feeling full is important to me A Few Times a Week   I finish all the food on my plate even if I am already full A Few Times a Week   I can't resist eating delicious food or walk past the good food/smell A Few Times a Week   I eat/snack without noticing that I am eating Almost Everyday   I eat when I am preparing the meal Almost Everyday   I eat more than usual when I see others eating A Few Times a Week   I have trouble not eating sweets, ice cream, cookies, or chips if they are around the house A Few Times a Week   I think about food all day Everyday   What foods, if any, do you crave? Sweets/Candy/Chocolate   Is always thinking about food.  Obsesses over food.          5/11/2023     9:58 AM   Amount of Food   I make myself vomit what I have eaten or  "use laxatives to get rid of food Never- yes, in past   I eat a large amount of food, like a loaf of bread, a box of cookies, a pint/quart of ice cream, all at once Yes   I eat a large amount of food even when I am not hungry Monthly 3-4 times a month   I eat rapidly Almost Everyday   I eat alone because I feel embarrassed and do not want others to see how much I have eaten Monthly   I eat until I am uncomfortably full Monthly   I feel bad, disgusted, or guilty after I overeat Almost Everyday   Typically feels likes she overeats.  Can eat a full meal in 5 minutes.       Binge Eating Screening:  Objective binges at least 1x per week for the past 3 months. 3-4 a month.    During a binge Nothing satisfies her.  Can eats 3 pieces of pizza, soda, and then breadsticks, brownies.  Almost like she blacks out when this happens.  \"How many piefes of chocolate can I have- never feels satisfied.\"  Feels like she can't control it. Following it she surfs internet for best ways of getting rid of the calories-cleanse, etc.  Has some trichotillomania and thinks these may be related.      Patient senses lack of control over eating during the binges. Yes   Binge eating causes stress. YES  Binge eating episodes are associated with 3 or more of the following:    Eating until uncomfortably full.- yes  Eating large amounts when not physically hungry.  yes  Eating much more rapidly than usual. - yes  Eating alone due to being embarassed by the amount eaten. No, usually when she is   Feeling disgusted, depressed, or guilty after overeating. - disgusted.    If patient answers yes to 3 of the above questions and answered yes to frequency, distress of eating behavior and avoids using compensatory behaviors, refer for binge eating assessment.    Has a hx of bulemia.        5/11/2023     9:58 AM   Activity/Exercise History   How much of a typical 12 hour day do you spend sitting? Most of the Day   How much of a typical 12 hour day do you spend " lying down? Less Than Half the Day   How much of a typical day do you spend walking/standing? Less Than Half the Day   How many hours (not including work) do you spend on the TV/Video Games/Computer/Tablet/Phone? 2-3 Hours   How many times a week are you active for the purpose of exercise? 4-5 TImes a Week   What keeps you from being more active? Lack of Time    Too tired   How many total minutes do you spend doing some activity for the purpose of exercising when you exercise? 15-30 Minutes     Joined gym a month 2 strength training classes 2x a week. Walking.  Gets steps daily.      PAST MEDICAL HISTORY:  Past Medical History:   Diagnosis Date     History of colposcopy with cervical biopsy 12/10/2019    see problem list     Papanicolaou smear of cervix with low grade squamous intraepithelial lesion (LGSIL) 10/30/2013           5/11/2023     9:58 AM   Work/Social History Reviewed With Patient   My employment status is Full-Time   My job is    How much of your job is spent on the computer or phone? 100%   How many hours do you spend commuting to work daily? Zero   What is your marital status? /In a Relationship   If in a relationship, is your significant other overweight? No   If you have children, are they overweight? No   Who do you live with?  and daughter   Who does the food shopping? Myself   Works at home 4 days a week.      Social History     Tobacco Use     Smoking status: Never     Smokeless tobacco: Never   Vaping Use     Vaping status: Never Used   Substance Use Topics     Alcohol use: Not Currently     Comment: Occasionally     Drug use: No    Drug dependence meth. In high school.                5/11/2023     9:58 AM   Mental Health History Reviewed With Patient   If yes, do you feel that the abuse is affecting your weight? No   If yes, would you like to talk to a counselor about the abuse? No   How often in the past 2 weeks have you felt little interest or pleasure in doing  things? For Several Days   Over the past 2 weeks how often have you felt down, depressed, or hopeless? For Several Days           5/11/2023     9:58 AM   Questions Reviewed With Patient   How ready are you to make changes regarding your weight? Number 1 = Not ready at all to make changes up to 10 = very ready. 10   How confident are you that you can change? 1 = Not confident that you will be successful making changes up to 10 = very confident. 8           5/11/2023     9:58 AM   Sleep History Reviewed With Patient   How many hours do you sleep at night? 7       MEDICATIONS:   Current Outpatient Medications   Medication Sig Dispense Refill     ALPRAZolam (XANAX) 0.25 MG tablet Take 0.25 mg by mouth 3 times daily as needed       lisdexamfetamine (VYVANSE) 30 MG capsule Take 1 capsule (30 mg) by mouth every morning 30 capsule 0     naltrexone (DEPADE/REVIA) 50 MG tablet Take 1/2 tablet 1-2 hours before biggest craving time for 7 days, then increase to 1/2 tablet twice daily. 30 tablet 2     pantoprazole (PROTONIX) 40 MG EC tablet Take 1 tablet (40 mg) by mouth daily 90 tablet 0     rizatriptan (MAXALT) 10 MG tablet Take 10 mg by mouth at onset of headache       sertraline (ZOLOFT) 100 MG tablet Take 1 tablet (100 mg) by mouth daily 90 tablet 3     topiramate (TOPAMAX) 25 MG tablet Take 1 tablet (25 mg) by mouth daily 90 tablet 0       ALLERGIES:   Allergies   Allergen Reactions     Sudafed [Pseudoephedrine Hcl]        ROS:    HEENT  H/O glaucoma:  no  Cardiovascular  CAD:   no  HTN:    no  Gastrointestinal  Liver Dz:   no  Psychiatric  Anxiety:   yes  Depression:  yes  H/O ETOH/Drug Use: yes  H/O eating disorder: yes      LABS/RECORDS REVIEWED:  Vitamin D Deficiency screening   Date Value Ref Range Status   08/18/2020 43 20 - 75 ug/L Final     Comment:     Season, race, dietary intake, and treatment affect the concentration of   25-hydroxy-Vitamin D. Values may decrease during winter months and increase   during summer  months. Values 20-29 ug/L may indicate Vitamin D insufficiency   and values <20 ug/L may indicate Vitamin D deficiency.  Vitamin D determination is routinely performed by an immunoassay specific for   25 hydroxyvitamin D3.  If an individual is on vitamin D2 (ergocalciferol)   supplementation, please specify 25 OH vitamin D2 and D3 level determination by   LCMSMS test [id number].       TSH   Date Value Ref Range Status   04/24/2023 2.12 0.30 - 4.20 uIU/mL Final   04/08/2022 1.90 0.40 - 4.00 mU/L Final   08/18/2020 2.08 0.40 - 4.00 mU/L Final     Sodium   Date Value Ref Range Status   04/24/2023 139 136 - 145 mmol/L Final   08/18/2020 141 133 - 144 mmol/L Final     Potassium   Date Value Ref Range Status   04/24/2023 3.9 3.4 - 5.3 mmol/L Final   08/18/2020 3.7 3.4 - 5.3 mmol/L Final     Chloride   Date Value Ref Range Status   04/24/2023 105 98 - 107 mmol/L Final   08/18/2020 109 94 - 109 mmol/L Final     Carbon Dioxide   Date Value Ref Range Status   08/18/2020 27 20 - 32 mmol/L Final     Carbon Dioxide (CO2)   Date Value Ref Range Status   04/24/2023 25 22 - 29 mmol/L Final     Anion Gap   Date Value Ref Range Status   04/24/2023 9 7 - 15 mmol/L Final   08/18/2020 5 3 - 14 mmol/L Final     Glucose   Date Value Ref Range Status   04/24/2023 101 (H) 70 - 99 mg/dL Final   08/18/2020 92 70 - 99 mg/dL Final     Comment:     Fasting specimen     Urea Nitrogen   Date Value Ref Range Status   04/24/2023 12.2 6.0 - 20.0 mg/dL Final   08/18/2020 18 7 - 30 mg/dL Final     Creatinine   Date Value Ref Range Status   04/24/2023 0.94 0.51 - 0.95 mg/dL Final   08/18/2020 0.94 0.52 - 1.04 mg/dL Final     GFR Estimate   Date Value Ref Range Status   04/24/2023 80 >60 mL/min/1.73m2 Final     Comment:     eGFR calculated using 2021 CKD-EPI equation.   08/18/2020 79 >60 mL/min/[1.73_m2] Final     Comment:     Non  GFR Calc  Starting 12/18/2018, serum creatinine based estimated GFR (eGFR) will be   calculated using the  Chronic Kidney Disease Epidemiology Collaboration   (CKD-EPI) equation.       Calcium   Date Value Ref Range Status   04/24/2023 9.3 8.6 - 10.0 mg/dL Final   08/18/2020 9.0 8.5 - 10.1 mg/dL Final     Bilirubin Total   Date Value Ref Range Status   04/24/2023 0.2 <=1.2 mg/dL Final   08/18/2020 0.3 0.2 - 1.3 mg/dL Final     Alkaline Phosphatase   Date Value Ref Range Status   04/24/2023 68 35 - 104 U/L Final   08/18/2020 65 40 - 150 U/L Final     ALT   Date Value Ref Range Status   04/24/2023 16 10 - 35 U/L Final   08/18/2020 42 0 - 50 U/L Final     AST   Date Value Ref Range Status   04/24/2023 23 10 - 35 U/L Final   08/18/2020 30 0 - 45 U/L Final     Cholesterol   Date Value Ref Range Status   04/24/2023 247 (H) <200 mg/dL Final   10/30/2019 204 (H) <200 mg/dL Final     Comment:     Desirable:       <200 mg/dl     HDL Cholesterol   Date Value Ref Range Status   10/30/2019 63 >49 mg/dL Final     Direct Measure HDL   Date Value Ref Range Status   04/24/2023 41 (L) >=50 mg/dL Final     LDL Cholesterol Calculated   Date Value Ref Range Status   04/24/2023 172 (H) <=100 mg/dL Final   10/30/2019 122 (H) <100 mg/dL Final     Comment:     Above desirable:  100-129 mg/dl  Borderline High:  130-159 mg/dL  High:             160-189 mg/dL  Very high:       >189 mg/dl       Triglycerides   Date Value Ref Range Status   04/24/2023 170 (H) <150 mg/dL Final   10/30/2019 93 <150 mg/dL Final     Comment:     Fasting specimen     WBC   Date Value Ref Range Status   08/18/2020 5.7 4.0 - 11.0 10e9/L Final     WBC Count   Date Value Ref Range Status   08/13/2021 9.5 4.0 - 11.0 10e3/uL Final     Hemoglobin   Date Value Ref Range Status   11/22/2021 10.2 (L) 11.7 - 15.7 g/dL Final   08/18/2020 13.4 11.7 - 15.7 g/dL Final     Hematocrit   Date Value Ref Range Status   08/13/2021 35.4 35.0 - 47.0 % Final   08/18/2020 38.9 35.0 - 47.0 % Final     MCV   Date Value Ref Range Status   08/13/2021 89 78 - 100 fL Final   08/18/2020 91 78 - 100 fl  "Final     Platelet Count   Date Value Ref Range Status   08/13/2021 265 150 - 450 10e3/uL Final   08/18/2020 263 150 - 450 10e9/L Final         BP Readings from Last 6 Encounters:   12/15/22 94/68   03/14/22 104/72   01/14/22 119/76   11/22/21 112/70   10/29/21 110/62   09/24/21 108/72       Pulse Readings from Last 6 Encounters:   12/15/22 77   10/19/20 64   12/10/19 69   10/30/19 74   06/26/19 70   11/06/18 64       PHYSICAL EXAM:  Ht 5' 3\" (1.6 m)   Wt 163 lb (73.9 kg)   BMI 28.87 kg/m    GENERAL: Healthy, alert and no distress  EYES: Eyes grossly normal to inspection.  No discharge or erythema, or obvious scleral/conjunctival abnormalities.  RESP: No audible wheeze, cough, or visible cyanosis.  No visible retractions or increased work of breathing.    SKIN: Visible skin clear. No significant rash, abnormal pigmentation or lesions.  NEURO: Cranial nerves grossly intact.  Mentation and speech appropriate for age.  PSYCH: Mentation appears normal, affect normal/bright, judgement and insight intact, normal speech and appearance well-groomed.    COUNSELING:   Reviewed obesity as a chronic disease and comprehensive management stratagies.      We discussed Bariatric Basics including:  -eating 3 meals daily  -eating protein first  -eating slowly, chewing food well  -avoiding/limiting calorie containing beverages  -limiting carbohydrates and changing to whole grains  -limiting restaurant or cafeteria eating to twice a week or less    We discussed the importance of restorative sleep and stress management in maintaining a healthy weight.  We discussed insulin resistance and glycemic index as it relates to appetite and weight control.   We discussed the importance of physical activity including cardiovascular and strength training in maintaining a healthier weight and explored viable options.  Patient education of above written in AVS.      Sincerely,    Marquita Crowe PA-C      "

## 2023-05-22 ENCOUNTER — MYC MEDICAL ADVICE (OUTPATIENT)
Dept: FAMILY MEDICINE | Facility: OTHER | Age: 38
End: 2023-05-22
Payer: COMMERCIAL

## 2023-06-06 ASSESSMENT — PATIENT HEALTH QUESTIONNAIRE - PHQ9
10. IF YOU CHECKED OFF ANY PROBLEMS, HOW DIFFICULT HAVE THESE PROBLEMS MADE IT FOR YOU TO DO YOUR WORK, TAKE CARE OF THINGS AT HOME, OR GET ALONG WITH OTHER PEOPLE: SOMEWHAT DIFFICULT
SUM OF ALL RESPONSES TO PHQ QUESTIONS 1-9: 4
SUM OF ALL RESPONSES TO PHQ QUESTIONS 1-9: 4

## 2023-06-06 ASSESSMENT — ANXIETY QUESTIONNAIRES
IF YOU CHECKED OFF ANY PROBLEMS ON THIS QUESTIONNAIRE, HOW DIFFICULT HAVE THESE PROBLEMS MADE IT FOR YOU TO DO YOUR WORK, TAKE CARE OF THINGS AT HOME, OR GET ALONG WITH OTHER PEOPLE: SOMEWHAT DIFFICULT
GAD7 TOTAL SCORE: 6
4. TROUBLE RELAXING: SEVERAL DAYS
3. WORRYING TOO MUCH ABOUT DIFFERENT THINGS: SEVERAL DAYS
5. BEING SO RESTLESS THAT IT IS HARD TO SIT STILL: NOT AT ALL
GAD7 TOTAL SCORE: 6
1. FEELING NERVOUS, ANXIOUS, OR ON EDGE: SEVERAL DAYS
8. IF YOU CHECKED OFF ANY PROBLEMS, HOW DIFFICULT HAVE THESE MADE IT FOR YOU TO DO YOUR WORK, TAKE CARE OF THINGS AT HOME, OR GET ALONG WITH OTHER PEOPLE?: SOMEWHAT DIFFICULT
6. BECOMING EASILY ANNOYED OR IRRITABLE: SEVERAL DAYS
7. FEELING AFRAID AS IF SOMETHING AWFUL MIGHT HAPPEN: SEVERAL DAYS
2. NOT BEING ABLE TO STOP OR CONTROL WORRYING: SEVERAL DAYS
GAD7 TOTAL SCORE: 6
7. FEELING AFRAID AS IF SOMETHING AWFUL MIGHT HAPPEN: SEVERAL DAYS

## 2023-06-06 NOTE — PROGRESS NOTES
Inessa is a 37 year old who is being evaluated via a billable video visit.      How would you like to obtain your AVS? MyChart  If the video visit is dropped, the invitation should be resent by: Text to cell phone: [phone]  Will anyone else be joining your video visit? No    Patient completed E-check in. Questionnaires were blown in and Patient checked in without being called. Any additional information will need to be completed by the provider.    Assessment & Plan     ICD-10-CM    1. Attention deficit hyperactivity disorder (ADHD), predominantly inattentive type - diagnosed in college  F90.0 lisdexamfetamine (VYVANSE) 30 MG capsule     DISCONTINUED: lisdexamfetamine (VYVANSE) 30 MG capsule     DISCONTINUED: lisdexamfetamine (VYVANSE) 30 MG capsule      2. JOEL (generalized anxiety disorder)  F41.1       3. Moderate episode of recurrent major depressive disorder (H)  F33.1       4. Overweight (BMI 25.0-29.9)  E66.3 topiramate (TOPAMAX) 25 MG tablet      5. Gastroesophageal reflux disease, unspecified whether esophagitis present  K21.9 Adult GI  Referral - Consult Only     Adult GI  Referral - Procedure Only        1. ADHD   - Stable on Vyvanse 30 mg   -  reviewed, no concerns   - CSA 12/15/22  - Utox 12/15/22 - no concerns      2 & 3. Mood   - Finds always needs less after time and in summer, gets numb  - Decreased to 1/2 tablet   - Instructed to do this for 2-4 weeks, then if wants to can stop or continue, refills are on file     4. Weight   - Saw weight management, was good but then some disappointments, see discussion below   - Started and tapered up on Naltrexone   - Topamax increased to 50 mg about 3 weeks ago, still no change on weight   - Will increase to 75 mg      Reviewed use and side effects   - Recheck 1 month   - Will also refer to nutrition      5. GERD   - Did not improve with 12 weeks of PPI therapy (Protonix 40 mg once daily)   - Runs in her family, reports very intense    - Will refer for EGD and GI specialist         Review of the result(s) of each unique test - See list       PHQ9 & GAD7        12/15/22 - Utox   Diagnosis or treatment significantly limited by social determinants of health - None     30 minutes spent on the date of the encounter doing chart review, history and exam, documentation and further activities as noted above    The patient indicates understanding of these issues and agrees with the plan.    Follow up: 1 month     Sadia Reagan PA-C  [facility] RYLEE Wylie is a 37 year old, presenting for the following health issues:  MH Follow Up (ADHD & Zoloft) and Weight Check (Management)        6/6/2023     2:43 PM   Additional Questions   Roomed by Wilton RODRIGUEZ   Accompanied by Self         6/6/2023     2:43 PM   Patient Reported Additional Medications   Patient reports taking the following new medications None     History of Present Illness       Mental Health Follow-up:  Patient presents to follow-up on Depression & Anxiety.Patient's depression since last visit has been:  Better  The patient is not having other symptoms associated with depression.  Patient's anxiety since last visit has been:  Better  The patient is not having other symptoms associated with anxiety.  Any significant life events: No  Patient is not feeling anxious or having panic attacks.  Patient has no concerns about alcohol or drug use.    Reason for visit:  Adhd and weight    She eats 2-3 servings of fruits and vegetables daily.She consumes 1 sweetened beverage(s) daily.She exercises with enough effort to increase her heart rate 20 to 29 minutes per day.  She exercises with enough effort to increase her heart rate 4 days per week.   She is taking medications regularly.    Today's PHQ-9         PHQ-9 Total Score: 4    PHQ-9 Q9 Thoughts of better off dead/self-harm past 2 weeks :   Not at all    How difficult have these problems made it for you  to do your work, take care of things at home, or get along with other people: Somewhat difficult  Today's JOEL-7 Score: 6     Inessa is here today to recheck ADHD/ADD.    Updates since last visit: fine     Routine for taking medicine, including time: AM around 9-10 am   Time medicine wears off: 6-8 hours   Issues at school: NA   Issues at home: none  Control of symptoms: good     Side effects:  Headaches: No  Stomach aches: No  Irritability/mood swings: No  Difficulties with sleep: No  Social withdrawal: No  Unusual movements/tics: No  Decreased appetite: No     Other concerns: mood and weight     - Mood      Started doing 1/2 dose of Sertraline, in summer finds always needs this      Was feeling numb, so decreased and feeling better, about 1 week ago      Always happens after being on medication for awhile      Thinks will take bottle then stop     - Weight management      Went well, good conversation, increased Topamax and start new medication (Naltrexone), told to PCP to increase Topamax but did increase to 50 mg at night (3 weeks ago)           Feels really great on the Topamax right away but then evened out      Just did this 1 week ago      Has to go more labs      Then nutrition got cancelled, they told her needs to go to eating disorder clinic       Did this 15 years ago, won't go back at first when told this, but then did look up places that were recommended, they are too intensive and to much time commitment        Said it was do to how much she obsessively thinks about food        Now back to 2.5 month wait for nutrition        Has follow up in 3 months       Weight 166 lbs     - GERD      Did not improve with Protonix once daily for 12 weeks      Had since HS, runs in family      It is very intense             12/14/2022     9:48 PM 2/22/2023    12:14 PM 6/6/2023    10:44 AM   PHQ   PHQ-9 Total Score 8 7 4   Q9: Thoughts of better off dead/self-harm past 2 weeks Not at all Not at all Not at all          12/14/2022     9:48 PM 2/22/2023    12:17 PM 6/6/2023    10:47 AM   JOEL-7 SCORE   Total Score 7 (mild anxiety) 12 (moderate anxiety) 6 (mild anxiety)   Total Score 7 12 6         Review of Systems   Constitutional, HEENT, cardiovascular, pulmonary, gi and gu systems are negative, except as otherwise noted.      Objective       Vitals:  No vitals were obtained today due to virtual visit.    Physical Exam   GENERAL: Healthy, alert and no distress  EYES: Eyes grossly normal to inspection.  No discharge or erythema, or obvious scleral/conjunctival abnormalities.  RESP: No audible wheeze, cough, or visible cyanosis.  No visible retractions or increased work of breathing.    SKIN: Visible skin clear. No significant rash, abnormal pigmentation or lesions.  NEURO: Cranial nerves grossly intact.  Mentation and speech appropriate for age.  PSYCH: Mentation appears normal, affect normal/bright, judgement and insight intact, normal speech and appearance well-groomed.    Diagnostics: none         Video-Visit Details    Type of service:  Video Visit   Originating Location (pt. Location): Home  Distant Location (provider location):  Off-site  Platform used for Video Visit: [employer]

## 2023-06-07 ENCOUNTER — VIRTUAL VISIT (OUTPATIENT)
Dept: FAMILY MEDICINE | Facility: OTHER | Age: 38
End: 2023-06-07
Payer: COMMERCIAL

## 2023-06-07 DIAGNOSIS — F33.1 MODERATE EPISODE OF RECURRENT MAJOR DEPRESSIVE DISORDER (H): ICD-10-CM

## 2023-06-07 DIAGNOSIS — F41.1 GAD (GENERALIZED ANXIETY DISORDER): ICD-10-CM

## 2023-06-07 DIAGNOSIS — F90.0 ATTENTION DEFICIT HYPERACTIVITY DISORDER (ADHD), PREDOMINANTLY INATTENTIVE TYPE: Primary | ICD-10-CM

## 2023-06-07 DIAGNOSIS — K21.9 GASTROESOPHAGEAL REFLUX DISEASE, UNSPECIFIED WHETHER ESOPHAGITIS PRESENT: ICD-10-CM

## 2023-06-07 DIAGNOSIS — E66.3 OVERWEIGHT (BMI 25.0-29.9): ICD-10-CM

## 2023-06-07 PROCEDURE — 99214 OFFICE O/P EST MOD 30 MIN: CPT | Mod: VID | Performed by: PHYSICIAN ASSISTANT

## 2023-06-07 RX ORDER — LISDEXAMFETAMINE DIMESYLATE 30 MG/1
30 CAPSULE ORAL EVERY MORNING
Qty: 30 CAPSULE | Refills: 0 | Status: SHIPPED | OUTPATIENT
Start: 2023-07-29 | End: 2023-06-07

## 2023-06-07 RX ORDER — TOPIRAMATE 25 MG/1
75 TABLET, FILM COATED ORAL DAILY
Qty: 90 TABLET | Refills: 1 | Status: SHIPPED | OUTPATIENT
Start: 2023-06-07 | End: 2023-08-25

## 2023-06-07 RX ORDER — LISDEXAMFETAMINE DIMESYLATE 30 MG/1
30 CAPSULE ORAL EVERY MORNING
Qty: 30 CAPSULE | Refills: 0 | Status: SHIPPED | OUTPATIENT
Start: 2023-08-28 | End: 2023-08-17

## 2023-06-07 RX ORDER — LISDEXAMFETAMINE DIMESYLATE 30 MG/1
30 CAPSULE ORAL EVERY MORNING
Qty: 30 CAPSULE | Refills: 0 | Status: SHIPPED | OUTPATIENT
Start: 2023-06-29 | End: 2023-06-07

## 2023-06-12 ENCOUNTER — TELEPHONE (OUTPATIENT)
Dept: GASTROENTEROLOGY | Facility: CLINIC | Age: 38
End: 2023-06-12
Payer: COMMERCIAL

## 2023-06-12 NOTE — TELEPHONE ENCOUNTER
"Endoscopy Scheduling Screen    Have you had a positive Covid test in the last 14 days?  No    Are you active on MyChart?   Yes    What insurance is in the chart?  BC/BS: Schedule in [facility] unless patient meets exclusion criteria.     Ordering/Referring Provider: VIRI    (If ordering provider performs procedure, schedule with ordering provider unless otherwise instructed. )    BMI: Estimated body mass index is 28.87 kg/m  as calculated from the following:    Height as of 5/12/23: 1.6 m (5' 3\").    Weight as of 5/12/23: 73.9 kg (163 lb).     Sedation Ordered  moderate sedation.   If patient BMI > 50 do not schedule in [facility].    Are you taking any prescription medications for pain?   No    Are you taking methadone or Suboxone?  No    Do you have a history of malignant hyperthermia or adverse reaction to anesthesia?  No    (Females) Are you currently pregnant?   No     Have you been diagnosed or told you have pulmonary hypertension?   No    Do you have an LVAD?  No    Have you been told you have moderate to severe sleep apnea?  No    Have you been told you have COPD, asthma, or any other lung disease?  No    Do you have any heart conditions?  No     Have you ever had or are you awaiting a heart or lung transplant?   No    Have you had a stroke or transient ischemic attack (TIA aka \"mini stroke\" in the last 6 months?   No    Have you been diagnosed with or been told you have cirrhosis of the liver?   No    Are you currently on dialysis?   No    Do you need assistance transferring?   No    BMI: Estimated body mass index is 28.87 kg/m  as calculated from the following:    Height as of 5/12/23: 1.6 m (5' 3\").    Weight as of 5/12/23: 73.9 kg (163 lb).     Is patients BMI > 40 and scheduling location [facility]?  No    Do you take the medication Phentermine, Ozempic or Wegovy?  No    Do you take the medication Naltrexone?  No    Do you take blood thinners?  No    Preferred Pharmacy:    [facility] [address] [facility] [place] [address] [address] " [address] [place] [address]  [address] [facility]  [place] [address]  Phone: [phone] Fax: [phone]      Prep   Are you currently on dialysis or do you have chronic kidney disease?  No (standard prep)    Do you have a diagnosis of diabetes?  No    Do you have a diagnosis of cystic fibrosis (CF)?  No    On a regular basis do you go 3 -5 days between bowel movements?  No    BMI > 40?  No    Final Scheduling Details   Colonoscopy prep sent?      Procedure scheduled  EGD     Surgeon:  SAUL     Date of procedure:  07/24/2023     Schedule PAC:   No    Location  PH    Sedation   MAC/Deep Sedation    Patient Reminders:    You will receive a call from a Nurse to review instructions and health history.  This assessment must be completed prior to your procedure.  Failure to complete the Nurse assessment may result in the procedure being cancelled.       On the day of your procedure, please designate an adult(s) who can drive you home stay with you for the next 24 hours. The medicines used in the exam will make you sleepy. You will not be able to drive.       You cannot take public transportation, ride share services, or non-medical taxi service without a responsible caregiver.  Medical transport services are allowed with the requirement that a responsible caregiver will receive you at your destination.  We require that drivers and caregivers are confirmed prior to your procedure.

## 2023-06-15 ENCOUNTER — LAB (OUTPATIENT)
Dept: LAB | Facility: OTHER | Age: 38
End: 2023-06-15
Payer: COMMERCIAL

## 2023-06-15 DIAGNOSIS — D50.9 IRON DEFICIENCY ANEMIA, UNSPECIFIED IRON DEFICIENCY ANEMIA TYPE: ICD-10-CM

## 2023-06-15 DIAGNOSIS — E66.3 OVERWEIGHT (BMI 25.0-29.9): ICD-10-CM

## 2023-06-15 DIAGNOSIS — G25.81 RESTLESS LEG: ICD-10-CM

## 2023-06-15 LAB
DEPRECATED CALCIDIOL+CALCIFEROL SERPL-MC: 35 UG/L (ref 20–75)
FERRITIN SERPL-MCNC: 67 NG/ML (ref 6–175)

## 2023-06-15 PROCEDURE — 36415 COLL VENOUS BLD VENIPUNCTURE: CPT

## 2023-06-15 PROCEDURE — 82728 ASSAY OF FERRITIN: CPT

## 2023-06-15 PROCEDURE — 82306 VITAMIN D 25 HYDROXY: CPT

## 2023-07-24 ENCOUNTER — ANESTHESIA EVENT (OUTPATIENT)
Dept: GASTROENTEROLOGY | Facility: CLINIC | Age: 38
End: 2023-07-24
Payer: COMMERCIAL

## 2023-07-24 ENCOUNTER — HOSPITAL ENCOUNTER (OUTPATIENT)
Facility: CLINIC | Age: 38
Discharge: HOME | End: 2023-07-24
Attending: SURGERY | Admitting: SURGERY
Payer: COMMERCIAL

## 2023-07-24 ENCOUNTER — ANESTHESIA (OUTPATIENT)
Dept: GASTROENTEROLOGY | Facility: CLINIC | Age: 38
End: 2023-07-24
Payer: COMMERCIAL

## 2023-07-24 VITALS
RESPIRATION RATE: 16 BRPM | HEART RATE: 59 BPM | DIASTOLIC BLOOD PRESSURE: 88 MMHG | SYSTOLIC BLOOD PRESSURE: 135 MMHG | OXYGEN SATURATION: 98 %

## 2023-07-24 LAB — UPPER GI ENDOSCOPY: NORMAL

## 2023-07-24 PROCEDURE — 43239 EGD BIOPSY SINGLE/MULTIPLE: CPT | Performed by: SURGERY

## 2023-07-24 PROCEDURE — 370N000017 HC ANESTHESIA TECHNICAL FEE, PER MIN: Performed by: SURGERY

## 2023-07-24 PROCEDURE — 250N000011 HC RX IP 250 OP 636: Performed by: NURSE ANESTHETIST, CERTIFIED REGISTERED

## 2023-07-24 PROCEDURE — 88305 TISSUE EXAM BY PATHOLOGIST: CPT | Mod: TC | Performed by: SURGERY

## 2023-07-24 PROCEDURE — 88305 TISSUE EXAM BY PATHOLOGIST: CPT | Mod: 26 | Performed by: PATHOLOGY

## 2023-07-24 PROCEDURE — 258N000003 HC RX IP 258 OP 636: Performed by: NURSE ANESTHETIST, CERTIFIED REGISTERED

## 2023-07-24 PROCEDURE — 250N000009 HC RX 250: Performed by: NURSE ANESTHETIST, CERTIFIED REGISTERED

## 2023-07-24 RX ORDER — NALOXONE HYDROCHLORIDE 0.4 MG/ML
0.2 INJECTION, SOLUTION INTRAMUSCULAR; INTRAVENOUS; SUBCUTANEOUS
Status: CANCELLED | OUTPATIENT
Start: 2023-07-24

## 2023-07-24 RX ORDER — PROPOFOL 10 MG/ML
INJECTION, EMULSION INTRAVENOUS PRN
Status: DISCONTINUED | OUTPATIENT
Start: 2023-07-24 | End: 2023-07-24

## 2023-07-24 RX ORDER — ONDANSETRON 4 MG/1
4 TABLET, ORALLY DISINTEGRATING ORAL EVERY 30 MIN PRN
Status: CANCELLED | OUTPATIENT
Start: 2023-07-24

## 2023-07-24 RX ORDER — ACETAMINOPHEN 325 MG/1
975 TABLET ORAL EVERY 6 HOURS PRN
Status: CANCELLED | OUTPATIENT
Start: 2023-07-24

## 2023-07-24 RX ORDER — ONDANSETRON 2 MG/ML
4 INJECTION INTRAMUSCULAR; INTRAVENOUS EVERY 6 HOURS PRN
Status: CANCELLED | OUTPATIENT
Start: 2023-07-24

## 2023-07-24 RX ORDER — PROCHLORPERAZINE MALEATE 5 MG
10 TABLET ORAL EVERY 6 HOURS PRN
Status: CANCELLED | OUTPATIENT
Start: 2023-07-24

## 2023-07-24 RX ORDER — ONDANSETRON 2 MG/ML
4 INJECTION INTRAMUSCULAR; INTRAVENOUS
Status: DISCONTINUED | OUTPATIENT
Start: 2023-07-24 | End: 2023-07-24 | Stop reason: HOSPADM

## 2023-07-24 RX ORDER — ONDANSETRON 2 MG/ML
4 INJECTION INTRAMUSCULAR; INTRAVENOUS EVERY 30 MIN PRN
Status: CANCELLED | OUTPATIENT
Start: 2023-07-24

## 2023-07-24 RX ORDER — LIDOCAINE 40 MG/G
CREAM TOPICAL
Status: DISCONTINUED | OUTPATIENT
Start: 2023-07-24 | End: 2023-07-24 | Stop reason: HOSPADM

## 2023-07-24 RX ORDER — LIDOCAINE HYDROCHLORIDE 20 MG/ML
INJECTION, SOLUTION INFILTRATION; PERINEURAL PRN
Status: DISCONTINUED | OUTPATIENT
Start: 2023-07-24 | End: 2023-07-24

## 2023-07-24 RX ORDER — NALOXONE HYDROCHLORIDE 0.4 MG/ML
0.4 INJECTION, SOLUTION INTRAMUSCULAR; INTRAVENOUS; SUBCUTANEOUS
Status: CANCELLED | OUTPATIENT
Start: 2023-07-24

## 2023-07-24 RX ORDER — PROPOFOL 10 MG/ML
INJECTION, EMULSION INTRAVENOUS CONTINUOUS PRN
Status: DISCONTINUED | OUTPATIENT
Start: 2023-07-24 | End: 2023-07-24

## 2023-07-24 RX ORDER — ONDANSETRON 4 MG/1
4 TABLET, ORALLY DISINTEGRATING ORAL EVERY 6 HOURS PRN
Status: CANCELLED | OUTPATIENT
Start: 2023-07-24

## 2023-07-24 RX ORDER — SODIUM CHLORIDE, SODIUM LACTATE, POTASSIUM CHLORIDE, CALCIUM CHLORIDE 600; 310; 30; 20 MG/100ML; MG/100ML; MG/100ML; MG/100ML
INJECTION, SOLUTION INTRAVENOUS CONTINUOUS
Status: DISCONTINUED | OUTPATIENT
Start: 2023-07-24 | End: 2023-07-24 | Stop reason: HOSPADM

## 2023-07-24 RX ORDER — FLUMAZENIL 0.1 MG/ML
0.2 INJECTION, SOLUTION INTRAVENOUS
Status: CANCELLED | OUTPATIENT
Start: 2023-07-24 | End: 2023-07-25

## 2023-07-24 RX ADMIN — PROPOFOL 250 MCG/KG/MIN: 10 INJECTION, EMULSION INTRAVENOUS at 16:42

## 2023-07-24 RX ADMIN — PROPOFOL 100 MG: 10 INJECTION, EMULSION INTRAVENOUS at 16:44

## 2023-07-24 RX ADMIN — SODIUM CHLORIDE, POTASSIUM CHLORIDE, SODIUM LACTATE AND CALCIUM CHLORIDE: 600; 310; 30; 20 INJECTION, SOLUTION INTRAVENOUS at 16:39

## 2023-07-24 RX ADMIN — LIDOCAINE HYDROCHLORIDE 100 MG: 20 INJECTION, SOLUTION INFILTRATION; PERINEURAL at 16:42

## 2023-07-24 RX ADMIN — PROPOFOL 100 MG: 10 INJECTION, EMULSION INTRAVENOUS at 16:42

## 2023-07-24 ASSESSMENT — ACTIVITIES OF DAILY LIVING (ADL): ADLS_ACUITY_SCORE: 35

## 2023-07-24 NOTE — DISCHARGE INSTRUCTIONS
[facility]    Home Care Following Endoscopy          Activity:  You have just undergone an endoscopic procedure usually performed with conscious sedation.    Do not work or operate machinery (including a car) for at least 12 hours.      Diet:  Return to the diet you were on before your procedure but eat lightly for the first 12-24 hours.  Drink plenty of water.  Resume any regular medications unless otherwise advised by your physician.    If instructed to do so, please begin any new medication prescribed as a result of your procedure as directed by your physician.   If you had any biopsy or polyp removed please refrain from aspirin or aspirin products for 2 days.   Pain:  You may take Tylenol as needed for pain.  Expected Recovery:   It is  normal to have a mild sore throat after upper endoscopy.    Call Your Physician if You Have:  After Upper Endoscopy:  Shoulder, back or chest pain.  Difficulty breathing or swallowing.  Vomiting blood.    Any questions or concerns about your recovery, please call [phone] or after hours [phone] ([phone]) Nurse Advice Line.    Follow-up Care:  If  you did have polyps/biopsy tissue sample(s) removed.  The polyps/biopsy tissue sample(s) will be sent to pathology.    You should receive letter in your My Chart  with your results within 1-2 weeks. If you do not participate in My Chart a physical letter will come in the mail in 2-3 weeks.  Please call if you have not received a notification of your results.  If asked to return to clinic please make an appointment 1 week after your procedure.  Call [phone].

## 2023-07-24 NOTE — ANESTHESIA PREPROCEDURE EVALUATION
Anesthesia Pre-Procedure Evaluation    Patient: Inessa Cardenas   MRN: [id number] : 1985        Procedure : Procedure(s):  Esophagoscopy, gastroscopy, duodenoscopy (EGD), combined          Past Medical History:   Diagnosis Date    History of colposcopy with cervical biopsy 12/10/2019    see problem list    Papanicolaou smear of cervix with low grade squamous intraepithelial lesion (LGSIL) 10/30/2013      Past Surgical History:   Procedure Laterality Date    LEEP TX, CERVICAL  2014      Allergies   Allergen Reactions    Sudafed [Pseudoephedrine Hcl]       Social History     Tobacco Use    Smoking status: Never    Smokeless tobacco: Never   Substance Use Topics    Alcohol use: Not Currently     Comment: Occasionally      Wt Readings from Last 1 Encounters:   23 73.9 kg (163 lb)        Anesthesia Evaluation   Pt has had prior anesthetic. Type: Regional.    No history of anesthetic complications       ROS/MED HX  ENT/Pulmonary:  - neg pulmonary ROS     Neurologic:     (+)      migraines,                          Cardiovascular:  - neg cardiovascular ROS     METS/Exercise Tolerance:     Hematologic:  - neg hematologic  ROS     Musculoskeletal:  - neg musculoskeletal ROS     GI/Hepatic:     (+) GERD, Symptomatic,                  Renal/Genitourinary:       Endo:       Psychiatric/Substance Use:     (+) psychiatric history 1 and depression       Infectious Disease:       Malignancy:       Other:            Physical Exam    Airway        Mallampati: I   TM distance: > 3 FB   Neck ROM: full   Mouth opening: > 3 cm    Respiratory Devices and Support         Dental       (+) Minor Abnormalities - some fillings, tiny chips      Cardiovascular   cardiovascular exam normal          Pulmonary   pulmonary exam normal                OUTSIDE LABS:  CBC:   Lab Results   Component Value Date    WBC 9.5 2021    WBC 5.7 2020    HGB 10.2 (L) 2021    HGB 12.3 2021    HCT 35.4 2021    HCT  38.9 08/18/2020     08/13/2021     08/18/2020     BMP:   Lab Results   Component Value Date     04/24/2023     08/18/2020    POTASSIUM 3.9 04/24/2023    POTASSIUM 3.7 08/18/2020    CHLORIDE 105 04/24/2023    CHLORIDE 109 08/18/2020    CO2 25 04/24/2023    CO2 27 08/18/2020    BUN 12.2 04/24/2023    BUN 18 08/18/2020    CR 0.94 04/24/2023    CR 0.94 08/18/2020     (H) 04/24/2023    GLC 92 08/18/2020     COAGS: No results found for: PTT, INR, FIBR  POC:   Lab Results   Component Value Date    HCG Negative 12/10/2019     HEPATIC:   Lab Results   Component Value Date    ALBUMIN 4.1 04/24/2023    PROTTOTAL 7.1 04/24/2023    ALT 16 04/24/2023    AST 23 04/24/2023    ALKPHOS 68 04/24/2023    BILITOTAL 0.2 04/24/2023     OTHER:   Lab Results   Component Value Date    WOO 9.3 04/24/2023    MAG 1.9 08/18/2020    TSH 2.12 04/24/2023       Anesthesia Plan    ASA Status:  2    NPO Status:  NPO Appropriate    Anesthesia Type: MAC.     - Reason for MAC: immobility needed   Induction: Propofol, Intravenous.   Maintenance: TIVA.        Consents    Anesthesia Plan(s) and associated risks, benefits, and realistic alternatives discussed. Questions answered and patient/representative(s) expressed understanding.     - Discussed: Risks, Benefits and Alternatives for BOTH SEDATION and the PROCEDURE were discussed     - Discussed with:  Patient      - Extended Intubation/Ventilatory Support Discussed: No.      - Patient is DNR/DNI Status: No     Use of blood products discussed: No .     Postoperative Care            Comments:    Other Comments: The risks and benefits of anesthesia, and the alternatives where applicable, have been discussed with the patient, and they wish to proceed.               WALTER Mendoza CRNA

## 2023-07-24 NOTE — ANESTHESIA POSTPROCEDURE EVALUATION
Patient: Inessa Cardenas    Procedure: Procedure(s):  ESOPHAGOGASTRODUODENOSCOPY, WITH BIOPSY       Anesthesia Type:  MAC    Note:  Disposition: Outpatient   Postop Pain Control: Uneventful            Sign Out: Well controlled pain   PONV: No   Neuro/Psych: Uneventful            Sign Out: Acceptable/Baseline neuro status   Airway/Respiratory: Uneventful            Sign Out: Acceptable/Baseline resp. status   CV/Hemodynamics: Uneventful            Sign Out: Acceptable CV status   Other NRE: NONE   DID A NON-ROUTINE EVENT OCCUR? No    Event details/Postop Comments:  Pt was happy with anesthesia care.  No complications.  I will follow up with the pt if needed.           Last vitals:  There were no vitals filed for this visit.    Electronically Signed By: WALTER Mendoza CRNA  July 24, 2023  5:18 PM

## 2023-07-24 NOTE — H&P
"Patient seen for Endoscopy    HPI:  Patient is a 37 year old female with GERD here for endoscopy. Not taking blood thinning medications. No MI or CVA history. No issues with previous sedation. No recent acute illness.    Review Of Systems    Skin: negative  Ears/Nose/Throat: negative  Respiratory: No shortness of breath, dyspnea on exertion, cough, or hemoptysis  Cardiovascular: negative  Gastrointestinal: negative  Genitourinary: negative  Musculoskeletal: negative  Neurologic: negative  Hematologic/Lymphatic/Immunologic: negative  Endocrine: negative      Past Medical History:   Diagnosis Date    History of colposcopy with cervical biopsy 12/10/2019    see problem list    Papanicolaou smear of cervix with low grade squamous intraepithelial lesion (LGSIL) 10/30/2013       Past Surgical History:   Procedure Laterality Date    LEEP TX, CERVICAL  07/22/2014       Family History   Problem Relation Age of Onset    Myocardial Infarction Mother     Heart Disease Father 38        Heart Attack    Cerebrovascular Disease Father         \"mild\" X2    Hypertension Father     Hyperlipidemia Father     No Known Problems Maternal Grandmother     Diabetes Maternal Grandfather     Cerebrovascular Disease Paternal Grandmother     Coronary Artery Disease Paternal Grandmother     Myocardial Infarction Paternal Grandfather     No Known Problems Sister     Cancer Maternal Uncle        Social History     Socioeconomic History    Marital status:      Spouse name: Not on file    Number of children: Not on file    Years of education: Not on file    Highest education level: Not on file   Occupational History    Not on file   Tobacco Use    Smoking status: Never    Smokeless tobacco: Never   Vaping Use    Vaping Use: Never used   Substance and Sexual Activity    Alcohol use: Not Currently     Comment: Occasionally    Drug use: No    Sexual activity: Yes     Partners: Male     Birth control/protection: Condom   Other Topics Concern    " Parent/sibling w/ CABG, MI or angioplasty before 65F 55M? Yes     Comment: Father 38   Social History Narrative    10/2020  Lives  in [place] with S.Robert BELCHER.  Neither of them smokes cigarettes.  No concerns about domestic violence.  No indoor cats/kittens.  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     Social Determinants of Health     Financial Resource Strain: Not on file   Food Insecurity: Not on file   Transportation Needs: Not on file   Physical Activity: Not on file   Stress: Not on file   Social Connections: Not on file   Intimate Partner Violence: Not on file   Housing Stability: Not on file       No current outpatient medications on file.       Medications and history reviewed    Physical exam:  Vitals: LMP  (LMP Unknown)   BMI= There is no height or weight on file to calculate BMI.    Constitutional: Healthy, alert, non-distressed   Head: Normo-cephalic, atraumatic, no lesions, masses or tenderness   Cardiovascular: RRR, no new murmurs, +S1, +S2 heart sounds, no clicks, rubs or gallops   Respiratory: CTAB, no rales, rhonchi or wheezing, equal chest rise, good respiratory effort   Gastrointestinal: Soft, non-tender, non distended, no rebound rigidity or guarding, no masses or hernias palpated   : Deferred  Musculoskeletal: Moves all extremities, normal  strength, no deformities noted   Skin: No suspicious lesions or rashes   Psychiatric: Mentation appears normal, affect appropriate   Hematologic/Lymphatic/Immunologic: Normal cervical and supraclavicular lymph nodes   Patient able to get up on table without difficulty.    Labs show:  No results found for this or any previous visit (from the past 24 hour(s)).    Assessment: Endoscopy  Plan: Pt cleared for anesthesia for proposed procedure.    Jamse Cook,

## 2023-07-24 NOTE — ANESTHESIA CARE TRANSFER NOTE
Patient: Inessa Cardenas    Procedure: Procedure(s):  ESOPHAGOGASTRODUODENOSCOPY, WITH BIOPSY       Diagnosis: Gastroesophageal reflux disease, unspecified whether esophagitis present [K21.9]  Diagnosis Additional Information: No value filed.    Anesthesia Type:   MAC     Note:    Oropharynx: oropharynx clear of all foreign objects and spontaneously breathing  Level of Consciousness: awake  Oxygen Supplementation: room air    Independent Airway: airway patency satisfactory and stable  Dentition: dentition unchanged  Vital Signs Stable: post-procedure vital signs reviewed and stable  Report to RN Given: handoff report given  Patient transferred to: Phase II    Handoff Report: Identifed the Patient, Identified the Reponsible Provider, Reviewed the pertinent medical history, Discussed the surgical course, Reviewed Intra-OP anesthesia mangement and issues during anesthesia, Set expectations for post-procedure period and Allowed opportunity for questions and acknowledgement of understanding      Vitals:  Vitals Value Taken Time   /64 07/24/23 1710   Temp     Pulse 60 07/24/23 1710   Resp     SpO2 98 % 07/24/23 1708   Vitals shown include unvalidated device data.    Electronically Signed By: WALTER Mendoza CRNA  July 24, 2023  5:18 PM

## 2023-07-24 NOTE — LETTER
Inessa Cardenas  [address] [place] [address]  August 2, 2023    Dear Inessa,  This letter is to inform you of the results of your pathology report from your endoscopy.  Your pathology report was:  Final Diagnosis   Gastroesophageal junction, biopsies:   --Squamocolumnar mucosa with acute and chronic inflammation and mild changes suggestive of reflux esophagitis.   --Negative for dysplasia or specialized intestinal metaplasia.   These findings are suggestive of reflux but no other concerning features were found.  No additional treatment or surveillance is necessary at this time.  If you have further questions please don t hesitate to call our clinic at [phone].   Sincerely,     James Cook, DO

## 2023-07-26 LAB
PATH REPORT.COMMENTS IMP SPEC: NORMAL
PATH REPORT.COMMENTS IMP SPEC: NORMAL
PATH REPORT.FINAL DX SPEC: NORMAL
PATH REPORT.GROSS SPEC: NORMAL
PATH REPORT.MICROSCOPIC SPEC OTHER STN: NORMAL
PATH REPORT.RELEVANT HX SPEC: NORMAL
PHOTO IMAGE: NORMAL

## 2023-08-17 ENCOUNTER — VIRTUAL VISIT (OUTPATIENT)
Dept: FAMILY MEDICINE | Facility: OTHER | Age: 38
End: 2023-08-17
Payer: COMMERCIAL

## 2023-08-17 DIAGNOSIS — F41.1 GAD (GENERALIZED ANXIETY DISORDER): ICD-10-CM

## 2023-08-17 DIAGNOSIS — E66.3 OVERWEIGHT (BMI 25.0-29.9): ICD-10-CM

## 2023-08-17 DIAGNOSIS — F33.1 MODERATE EPISODE OF RECURRENT MAJOR DEPRESSIVE DISORDER (H): ICD-10-CM

## 2023-08-17 DIAGNOSIS — F90.0 ATTENTION DEFICIT HYPERACTIVITY DISORDER (ADHD), PREDOMINANTLY INATTENTIVE TYPE: Primary | ICD-10-CM

## 2023-08-17 PROCEDURE — 99214 OFFICE O/P EST MOD 30 MIN: CPT | Mod: VID | Performed by: PHYSICIAN ASSISTANT

## 2023-08-17 RX ORDER — LISDEXAMFETAMINE DIMESYLATE 20 MG/1
20 CAPSULE ORAL EVERY MORNING
Qty: 30 CAPSULE | Refills: 0 | Status: SHIPPED | OUTPATIENT
Start: 2023-08-28 | End: 2023-09-05

## 2023-08-17 RX ORDER — LISDEXAMFETAMINE DIMESYLATE 30 MG/1
30 CAPSULE ORAL EVERY MORNING
Qty: 30 CAPSULE | Refills: 0 | Status: CANCELLED | OUTPATIENT
Start: 2023-08-28

## 2023-08-17 ASSESSMENT — ANXIETY QUESTIONNAIRES
GAD7 TOTAL SCORE: 5
5. BEING SO RESTLESS THAT IT IS HARD TO SIT STILL: NOT AT ALL
2. NOT BEING ABLE TO STOP OR CONTROL WORRYING: NOT AT ALL
IF YOU CHECKED OFF ANY PROBLEMS ON THIS QUESTIONNAIRE, HOW DIFFICULT HAVE THESE PROBLEMS MADE IT FOR YOU TO DO YOUR WORK, TAKE CARE OF THINGS AT HOME, OR GET ALONG WITH OTHER PEOPLE: NOT DIFFICULT AT ALL
6. BECOMING EASILY ANNOYED OR IRRITABLE: SEVERAL DAYS
4. TROUBLE RELAXING: SEVERAL DAYS
7. FEELING AFRAID AS IF SOMETHING AWFUL MIGHT HAPPEN: SEVERAL DAYS
GAD7 TOTAL SCORE: 5
1. FEELING NERVOUS, ANXIOUS, OR ON EDGE: SEVERAL DAYS
3. WORRYING TOO MUCH ABOUT DIFFERENT THINGS: SEVERAL DAYS

## 2023-08-17 ASSESSMENT — PATIENT HEALTH QUESTIONNAIRE - PHQ9
SUM OF ALL RESPONSES TO PHQ QUESTIONS 1-9: 5
10. IF YOU CHECKED OFF ANY PROBLEMS, HOW DIFFICULT HAVE THESE PROBLEMS MADE IT FOR YOU TO DO YOUR WORK, TAKE CARE OF THINGS AT HOME, OR GET ALONG WITH OTHER PEOPLE: SOMEWHAT DIFFICULT
SUM OF ALL RESPONSES TO PHQ QUESTIONS 1-9: 5

## 2023-08-17 NOTE — PROGRESS NOTES
Inessa is a 37 year old who is being evaluated via a billable video visit.      How would you like to obtain your AVS? MyChart  If the video visit is dropped, the invitation should be resent by: Text to cell phone: [phone]  Will anyone else be joining your video visit? No      Assessment & Plan     ICD-10-CM    1. Attention deficit hyperactivity disorder (ADHD), predominantly inattentive type - diagnosed in college  F90.0 lisdexamfetamine (VYVANSE) 20 MG capsule      2. Overweight (BMI 25.0-29.9)  E66.3       3. JOEL (generalized anxiety disorder)  F41.1       4. Moderate episode of recurrent major depressive disorder (H)  F33.1         ADHD   - Hasn't been taking because doesn't like way it makes her feel     Will decrease to 20 mg      Also discussed trying something different, patient would like to wait on this as might be trying for another baby   -  reviewed, no concerns   - CSA 12/15/22  - Utox 12/15/22 - no concerns   - Recheck when needs refill     2.   - On Topamax and Naltrexone   - Discussed stopping before trying for pregnancy   - Reviewed both use and side effects     3. & 4.   - Tapered off mediation because was feeling good, was good for awhile, but now just very blah feeling, no happy   - Sleeping better though   - Just struggled for years, on and off medications because they stop working   - Discussed genesight testing, patient open to this, scheduled time to discuss in clinic         Review of the result(s) of each unique test - See list         Utox - 12/15/22   Diagnosis or treatment significantly limited by social determinants of health - none     30 minutes spent on the date of the encounter doing chart review, history and exam, documentation and further activities as noted above    The patient indicates understanding of these issues and agrees with the plan.    GORDON Sampson [facility]    Ekaterina Wylie is a 37 year old,  presenting for the following health issues:  Recheck Medication        8/17/2023     2:21 PM   Additional Questions   Roomed by Wilton RODRIGUEZ   Accompanied by Self         8/17/2023     2:21 PM   Patient Reported Additional Medications   Patient reports taking the following new medications None       History of Present Illness       Reason for visit:  Med Check    She eats 2-3 servings of fruits and vegetables daily.She consumes 1 sweetened beverage(s) daily.She exercises with enough effort to increase her heart rate 20 to 29 minutes per day.  She exercises with enough effort to increase her heart rate 3 or less days per week.   She is taking medications regularly.     Inessa is here today to recheck ADHD/ADD.    Updates since last visit: Sleeping better   - But hasn't really been taking, yulisa 2x/week   - Doesn't like the way makes her feel   - Mood changes when comes off it - crabby, irritable   - Weird energy   - Unclear if this started when tapered off medication (mood)      Felt really good for awhile, but now feels not a happy person   - Adderall was only other one tried, would get sick and also forget to eat     Routine for taking medicine, including time: am  Time medicine wears off: 6 hours   Issues at school: NA   Issues at home: none   Control of symptoms: Fair     Side effects:  Headaches: No  Stomach aches: No  Irritability/mood swings: Yes, didn't used to   Difficulties with sleep: No  Social withdrawal: No  Unusual movements/tics: No  Decreased appetite: No    Other concerns: none     - Joined weight watchers   - Not working with weight management anymore         Review of Systems   Constitutional, HEENT, cardiovascular, pulmonary, gi and gu systems are negative, except as otherwise noted.      Objective       Vitals:  No vitals were obtained today due to virtual visit.    Physical Exam   GENERAL: Healthy, alert and no distress  EYES: Eyes grossly normal to inspection.  No discharge or erythema, or  obvious scleral/conjunctival abnormalities.  RESP: No audible wheeze, cough, or visible cyanosis.  No visible retractions or increased work of breathing.    SKIN: Visible skin clear. No significant rash, abnormal pigmentation or lesions.  NEURO: Cranial nerves grossly intact.  Mentation and speech appropriate for age.  PSYCH: Mentation appears normal, affect normal/bright, judgement and insight intact, normal speech and appearance well-groomed.    Diagnostics: none         Video-Visit Details    Type of service:  Video Visit   Originating Location (pt. Location): Home  Distant Location (provider location):  Off-site  Platform used for Video Visit: [employer]

## 2023-08-21 ENCOUNTER — MEDICAL CORRESPONDENCE (OUTPATIENT)
Dept: HEALTH INFORMATION MANAGEMENT | Facility: CLINIC | Age: 38
End: 2023-08-21

## 2023-08-21 ENCOUNTER — OFFICE VISIT (OUTPATIENT)
Dept: FAMILY MEDICINE | Facility: OTHER | Age: 38
End: 2023-08-21
Payer: COMMERCIAL

## 2023-08-21 VITALS
SYSTOLIC BLOOD PRESSURE: 102 MMHG | RESPIRATION RATE: 20 BRPM | HEIGHT: 63 IN | TEMPERATURE: 98.5 F | OXYGEN SATURATION: 97 % | BODY MASS INDEX: 28.88 KG/M2 | DIASTOLIC BLOOD PRESSURE: 68 MMHG | WEIGHT: 163 LBS | HEART RATE: 58 BPM

## 2023-08-21 DIAGNOSIS — F33.1 MODERATE EPISODE OF RECURRENT MAJOR DEPRESSIVE DISORDER (H): ICD-10-CM

## 2023-08-21 DIAGNOSIS — F41.1 GAD (GENERALIZED ANXIETY DISORDER): Primary | ICD-10-CM

## 2023-08-21 PROCEDURE — 99213 OFFICE O/P EST LOW 20 MIN: CPT | Performed by: PHYSICIAN ASSISTANT

## 2023-08-21 PROCEDURE — 96127 BRIEF EMOTIONAL/BEHAV ASSMT: CPT | Performed by: PHYSICIAN ASSISTANT

## 2023-08-21 ASSESSMENT — PATIENT HEALTH QUESTIONNAIRE - PHQ9
10. IF YOU CHECKED OFF ANY PROBLEMS, HOW DIFFICULT HAVE THESE PROBLEMS MADE IT FOR YOU TO DO YOUR WORK, TAKE CARE OF THINGS AT HOME, OR GET ALONG WITH OTHER PEOPLE: SOMEWHAT DIFFICULT
SUM OF ALL RESPONSES TO PHQ QUESTIONS 1-9: 6
SUM OF ALL RESPONSES TO PHQ QUESTIONS 1-9: 6

## 2023-08-21 ASSESSMENT — PAIN SCALES - GENERAL: PAINLEVEL: NO PAIN (0)

## 2023-08-21 NOTE — PROGRESS NOTES
Assessment & Plan     ICD-10-CM    1. JOEL (generalized anxiety disorder)  F41.1       2. Moderate episode of recurrent major depressive disorder (H)  F33.1         - Patient here for Genesight testing   - Had previously discussed as patient has tried and failed many medications, often feels they help in the beginning and then stop working   - Reviewed testing and results, patient consented and was collected   - Await results       Review of the result(s) of each unique test - PHQ9 & GAD7    Diagnosis or treatment significantly limited by social determinants of health - none     15 minutes spent on the date of the encounter doing chart review, history and exam, documentation and further activities as noted above    The patient indicates understanding of these issues and agrees with the plan.    Sadia Reagan PA-C  [facility] RYLEE Wylie is a 37 year old, presenting for the following health issues:  Recheck Medication      8/21/2023    11:33 AM   Additional Questions   Roomed by Nya   Accompanied by Self         8/21/2023    11:33 AM   Patient Reported Additional Medications   Patient reports taking the following new medications NA       HPI     Depression and Anxiety Follow-Up  How are you doing with your depression since your last visit? Improved   How are you doing with your anxiety since your last visit?  Improved   Are you having other symptoms that might be associated with depression or anxiety? No  Have you had a significant life event? No   Do you have any concerns with your use of alcohol or other drugs? No    Social History     Tobacco Use    Smoking status: Never    Smokeless tobacco: Never   Vaping Use    Vaping Use: Never used   Substance Use Topics    Alcohol use: Not Currently     Comment: Occasionally    Drug use: No         6/6/2023    10:44 AM 8/17/2023     2:25 PM 8/21/2023    11:25 AM   PHQ   PHQ-9 Total Score 4 5 6   Q9: Thoughts of better  "off dead/self-harm past 2 weeks Not at all Not at all Not at all         2/22/2023    12:17 PM 6/6/2023    10:47 AM 8/17/2023     2:24 PM   JOEL-7 SCORE   Total Score 12 (moderate anxiety) 6 (mild anxiety) 5 (mild anxiety)   Total Score 12 6 5         8/21/2023    11:25 AM   Last PHQ-9   1.  Little interest or pleasure in doing things 1   2.  Feeling down, depressed, or hopeless 0   3.  Trouble falling or staying asleep, or sleeping too much 1   4.  Feeling tired or having little energy 2   5.  Poor appetite or overeating 1   6.  Feeling bad about yourself 0   7.  Trouble concentrating 1   8.  Moving slowly or restless 0   Q9: Thoughts of better off dead/self-harm past 2 weeks 0   PHQ-9 Total Score 6         8/17/2023     2:24 PM   JOEL-7    1. Feeling nervous, anxious, or on edge 1   2. Not being able to stop or control worrying 0   3. Worrying too much about different things 1   4. Trouble relaxing 1   5. Being so restless that it is hard to sit still 0   6. Becoming easily annoyed or irritable 1   7. Feeling afraid, as if something awful might happen 1   JOEL-7 Total Score 5   If you checked any problems, how difficult have they made it for you to do your work, take care of things at home, or get along with other people? Not difficult at all       Review of Systems   Constitutional, HEENT, cardiovascular, pulmonary, gi and gu systems are negative, except as otherwise noted.      Objective    /68   Pulse 58   Temp 98.5  F (36.9  C) (Temporal)   Resp 20   Ht 1.6 m (5' 2.99\")   Wt 73.9 kg (163 lb)   LMP 08/20/2023 (Exact Date)   SpO2 97%   BMI 28.88 kg/m    Body mass index is 28.88 kg/m .  Physical Exam   GENERAL APPEARANCE: healthy, alert and no distress  EYES: Eyes grossly normal to inspection, PERRLA, conjunctivae and sclerae without injection or discharge, EOM intact   MS: No musculoskeletal defects are noted and gait is age appropriate without ataxia   SKIN: No suspicious lesions or rashes, hydration " status appears adeuqate with normal skin turgor   PSYCH: Alert and oriented x3; speech- coherent , normal rate and volume; able to articulate logical thoughts, able to abstract reason, no tangential thoughts, no hallucinations or delusions, mentation appears normal, Mood is euthymic. Affect is appropriate for this mood state and bright. Thought content is free of suicidal ideation, hallucinations, and delusions. Dress is adequate and upkept. Eye contact is good during conversation.       Diagnostics: none

## 2023-08-23 ENCOUNTER — TRANSFERRED RECORDS (OUTPATIENT)
Dept: HEALTH INFORMATION MANAGEMENT | Facility: CLINIC | Age: 38
End: 2023-08-23
Payer: COMMERCIAL

## 2023-08-25 DIAGNOSIS — E66.3 OVERWEIGHT (BMI 25.0-29.9): ICD-10-CM

## 2023-08-25 RX ORDER — TOPIRAMATE 25 MG/1
75 TABLET, FILM COATED ORAL DAILY
Qty: 90 TABLET | Refills: 0 | Status: SHIPPED | OUTPATIENT
Start: 2023-08-25 | End: 2023-09-26

## 2023-09-05 ENCOUNTER — MYC REFILL (OUTPATIENT)
Dept: FAMILY MEDICINE | Facility: OTHER | Age: 38
End: 2023-09-05
Payer: COMMERCIAL

## 2023-09-05 DIAGNOSIS — F90.0 ATTENTION DEFICIT HYPERACTIVITY DISORDER (ADHD), PREDOMINANTLY INATTENTIVE TYPE: ICD-10-CM

## 2023-09-05 RX ORDER — LISDEXAMFETAMINE DIMESYLATE 20 MG/1
20 CAPSULE ORAL EVERY MORNING
Qty: 30 CAPSULE | Refills: 0 | Status: SHIPPED | OUTPATIENT
Start: 2023-09-05 | End: 2023-10-09

## 2023-09-05 NOTE — TELEPHONE ENCOUNTER
reviewed. Due for routine fill. E-prescribed.     Darrell Reagan PA-C  [facility]

## 2023-09-06 ENCOUNTER — VIRTUAL VISIT (OUTPATIENT)
Dept: FAMILY MEDICINE | Facility: OTHER | Age: 38
End: 2023-09-06
Payer: COMMERCIAL

## 2023-09-06 DIAGNOSIS — G43.009 MIGRAINE WITHOUT AURA AND WITHOUT STATUS MIGRAINOSUS, NOT INTRACTABLE: ICD-10-CM

## 2023-09-06 DIAGNOSIS — F41.1 GAD (GENERALIZED ANXIETY DISORDER): Primary | ICD-10-CM

## 2023-09-06 DIAGNOSIS — F33.1 MODERATE EPISODE OF RECURRENT MAJOR DEPRESSIVE DISORDER (H): ICD-10-CM

## 2023-09-06 PROCEDURE — 99214 OFFICE O/P EST MOD 30 MIN: CPT | Mod: VID | Performed by: PHYSICIAN ASSISTANT

## 2023-09-06 RX ORDER — ALPRAZOLAM 0.25 MG
0.25 TABLET ORAL 3 TIMES DAILY PRN
Qty: 20 TABLET | Refills: 0 | Status: SHIPPED | OUTPATIENT
Start: 2023-09-06 | End: 2024-09-26

## 2023-09-06 RX ORDER — RIZATRIPTAN BENZOATE 10 MG/1
10 TABLET ORAL
Status: CANCELLED | OUTPATIENT
Start: 2023-09-06

## 2023-09-06 RX ORDER — ALPRAZOLAM 0.25 MG
0.25 TABLET ORAL 3 TIMES DAILY PRN
Status: CANCELLED | OUTPATIENT
Start: 2023-09-06

## 2023-09-06 RX ORDER — VILAZODONE HYDROCHLORIDE 10 MG/1
10 TABLET ORAL DAILY
Qty: 30 TABLET | Refills: 1 | Status: SHIPPED | OUTPATIENT
Start: 2023-09-06 | End: 2023-10-09

## 2023-09-06 RX ORDER — RIZATRIPTAN BENZOATE 10 MG/1
10 TABLET ORAL
Qty: 18 TABLET | Refills: 3 | Status: SHIPPED | OUTPATIENT
Start: 2023-09-06

## 2023-09-06 NOTE — PROGRESS NOTES
"Inessa is a 37 year old who is being evaluated via a billable video visit.      How would you like to obtain your AVS? MyChart  If the video visit is dropped, the invitation should be resent by: Text to cell phone: [phone]  Will anyone else be joining your video visit? No  {If patient encounters technical issues they should call [phone] :952369}    {PROVIDER CHARTING PREFERENCE:080910}    Subjective   Inessa is a 37 year old, presenting for the following health issues:  Results (Genesight)        9/6/2023     1:15 PM   Additional Questions   Roomed by Wilton RODRIGUEZ   Accompanied by Self         9/6/2023     1:15 PM   Patient Reported Additional Medications   Patient reports taking the following new medications None       History of Present Illness       Reason for visit:  Genesight Results    She eats 4 or more servings of fruits and vegetables daily.She consumes 1 sweetened beverage(s) daily.She exercises with enough effort to increase her heart rate 20 to 29 minutes per day.  She exercises with enough effort to increase her heart rate 3 or less days per week.   She is taking medications regularly.       {SUPERLIST (Optional):814503}  {additonal problems for provider to add (Optional):445826}      Review of Systems   {ROS COMP (Optional):671521}      Objective           Vitals:  No vitals were obtained today due to virtual visit.    Physical Exam   {video visit exam brief selected:452940}    {Diagnostic Test Results (Optional):593340}    {AMBULATORY ATTESTATION (Optional):986759}        Video-Visit Details    Type of service:  Video Visit     Originating Location (pt. Location): {video visit patient location:245245::\"Home\"}  {PROVIDER LOCATION On-site should be selected for visits conducted from your clinic location or adjoining [facility] hospital, academic office, or other nearby [facility] building. Off-site should be selected for all other provider locations, including home:141436}  Distant Location (provider " "location):  {virtual location provider:912679}  Platform used for Video Visit: {Virtual Visit Platforms:471194::\"[facility]\"}      "

## 2023-09-06 NOTE — PROGRESS NOTES
Inessa is a 37 year old who is being evaluated via a billable video visit.      How would you like to obtain your AVS? MyChart  If the video visit is dropped, the invitation should be resent by: Text to cell phone: [phone]  Will anyone else be joining your video visit? No    Assessment & Plan     ICD-10-CM    1. JOEL (generalized anxiety disorder)  F41.1 vilazodone (VIIBRYD) 10 MG TABS tablet     ALPRAZolam (XANAX) 0.25 MG tablet      2. Moderate episode of recurrent major depressive disorder (H)  F33.1 vilazodone (VIIBRYD) 10 MG TABS tablet      3. Migraine without aura and without status migrainosus, not intractable  G43.009 rizatriptan (MAXALT) 10 MG tablet        1. Depression and anxiety  - As previous, patient tried and failed many medications, genesight testing was recommended, here to discuss results   - Several with no interactions     However is still thinking about pregnancy, so will stick to just SSRIs    - We discussed the option of SSRIs - Viibryd and Trintellix and their side effects, risks, and benefits of SSRIs. We will start her on a low dose of 10 mg of Viibryd   - Recheck 4-6 weeks   - Also asks for refill of Xanax, last fill >1 year ago, takes only when flying   -  reviewed, no fills in last year   - Reviewed use and side effects including sedation and addiction     2. Migraine.  - Stable, rare   - Reviewed medication use and side effects, refilled     Review of the result(s) of each unique test - Genesight testing     Diagnosis or treatment significantly limited by social determinants of health - None     30 minutes spent on the date of the encounter doing chart review, history and exam, documentation and further activities as noted above    The patient indicates understanding of these issues and agrees with the plan.    PA student as below was present and participated in shadow capacity only    HEBER Pryor   [facility]     GORDON Sampson  [facility] RYLEE Wylie is a 37 year old, presenting for the following health issues:  Recheck Medication        8/21/2023    11:33 AM   Additional Questions   Roomed by Nya   Accompanied by Self       HPI   Patient here to review Genesight testing     The patient is a 37-year-old female who presents via virtual visit to discuss lab results.    She did see the report from [facility] and found that Zoloft did not hit her well years ago. She has tried a lot of medications. She was disappointed that there was not more stimulants like Adderall and Vyvanse that she wanted to learn more about. She does not remember if she has tried Wellbutrin in the past. She has tried Celexa, Lexapro, and Effexor in the past. She has not tried Cymbalta. She is wondering if there is anything safer with pregnancy than not. She is willing to try something else. Her goal is to come off of the naltrexone and Topamax for the sake of family planning. She has not noticed any big changes on the Topamax. She was depressed because she was so sick all this time. Her anxiety has improved. She has low energy and is not a happy person. She is open to trying something new for the time being.    She is requesting a refill of her Maxalt and Xanax. She takes her Xanax when she flies.        Review of Systems   Constitutional, HEENT, cardiovascular, pulmonary, gi and gu systems are negative, except as otherwise noted.      Objective       Vitals:  No vitals were obtained today due to virtual visit.    Physical Exam   GENERAL: Healthy, alert and no distress  EYES: Eyes grossly normal to inspection.  No discharge or erythema, or obvious scleral/conjunctival abnormalities.  RESP: No audible wheeze, cough, or visible cyanosis.  No visible retractions or increased work of breathing.    SKIN: Visible skin clear. No significant rash, abnormal pigmentation or lesions.  NEURO: Cranial nerves grossly intact.  Mentation and speech  appropriate for age.  PSYCH: Mentation appears normal, affect normal/bright, judgement and insight intact, normal speech and appearance well-groomed.    Diagnostics: Genesight testing, sent to scanning             Video-Visit Details    Type of service:  Video Visit   Originating Location (pt. Location): Home  Distant Location (provider location):  Off-site  Platform used for Video Visit: [employer]

## 2023-09-26 DIAGNOSIS — E66.3 OVERWEIGHT (BMI 25.0-29.9): ICD-10-CM

## 2023-09-26 RX ORDER — TOPIRAMATE 25 MG/1
75 TABLET, FILM COATED ORAL DAILY
Qty: 90 TABLET | Refills: 0 | Status: SHIPPED | OUTPATIENT
Start: 2023-09-26 | End: 2023-10-09

## 2023-10-09 ENCOUNTER — OFFICE VISIT (OUTPATIENT)
Dept: FAMILY MEDICINE | Facility: OTHER | Age: 38
End: 2023-10-09
Payer: COMMERCIAL

## 2023-10-09 VITALS
WEIGHT: 163 LBS | SYSTOLIC BLOOD PRESSURE: 100 MMHG | HEART RATE: 74 BPM | OXYGEN SATURATION: 98 % | BODY MASS INDEX: 28.88 KG/M2 | RESPIRATION RATE: 20 BRPM | TEMPERATURE: 98.8 F | DIASTOLIC BLOOD PRESSURE: 68 MMHG | HEIGHT: 63 IN

## 2023-10-09 DIAGNOSIS — Z23 ENCOUNTER FOR IMMUNIZATION: ICD-10-CM

## 2023-10-09 DIAGNOSIS — F33.1 MODERATE EPISODE OF RECURRENT MAJOR DEPRESSIVE DISORDER (H): ICD-10-CM

## 2023-10-09 DIAGNOSIS — Z12.4 CERVICAL CANCER SCREENING: ICD-10-CM

## 2023-10-09 DIAGNOSIS — F41.1 GAD (GENERALIZED ANXIETY DISORDER): ICD-10-CM

## 2023-10-09 DIAGNOSIS — Z00.00 ROUTINE GENERAL MEDICAL EXAMINATION AT A HEALTH CARE FACILITY: ICD-10-CM

## 2023-10-09 DIAGNOSIS — N92.6 MISSED PERIOD: ICD-10-CM

## 2023-10-09 DIAGNOSIS — F90.0 ATTENTION DEFICIT HYPERACTIVITY DISORDER (ADHD), PREDOMINANTLY INATTENTIVE TYPE: Primary | ICD-10-CM

## 2023-10-09 LAB
CREAT UR-MCNC: 108 MG/DL
HCG UR QL: NEGATIVE

## 2023-10-09 PROCEDURE — 87624 HPV HI-RISK TYP POOLED RSLT: CPT | Performed by: PHYSICIAN ASSISTANT

## 2023-10-09 PROCEDURE — 81025 URINE PREGNANCY TEST: CPT | Performed by: PHYSICIAN ASSISTANT

## 2023-10-09 PROCEDURE — 96127 BRIEF EMOTIONAL/BEHAV ASSMT: CPT | Mod: 59 | Performed by: PHYSICIAN ASSISTANT

## 2023-10-09 PROCEDURE — 99214 OFFICE O/P EST MOD 30 MIN: CPT | Mod: 25 | Performed by: PHYSICIAN ASSISTANT

## 2023-10-09 PROCEDURE — 90471 IMMUNIZATION ADMIN: CPT | Performed by: PHYSICIAN ASSISTANT

## 2023-10-09 PROCEDURE — 90686 IIV4 VACC NO PRSV 0.5 ML IM: CPT | Performed by: PHYSICIAN ASSISTANT

## 2023-10-09 PROCEDURE — G0480 DRUG TEST DEF 1-7 CLASSES: HCPCS | Performed by: PHYSICIAN ASSISTANT

## 2023-10-09 PROCEDURE — 99395 PREV VISIT EST AGE 18-39: CPT | Mod: 25 | Performed by: PHYSICIAN ASSISTANT

## 2023-10-09 PROCEDURE — G0145 SCR C/V CYTO,THINLAYER,RESCR: HCPCS | Performed by: PHYSICIAN ASSISTANT

## 2023-10-09 RX ORDER — VILAZODONE HYDROCHLORIDE 10 MG/1
10 TABLET ORAL DAILY
Qty: 90 TABLET | Refills: 3 | Status: SHIPPED | OUTPATIENT
Start: 2023-10-09 | End: 2024-04-17

## 2023-10-09 RX ORDER — LEVOMEFOLATE CALCIUM 15 MG
15 TABLET ORAL DAILY
Qty: 90 TABLET | Refills: 3 | Status: SHIPPED | OUTPATIENT
Start: 2023-10-09 | End: 2024-09-26

## 2023-10-09 RX ORDER — LISDEXAMFETAMINE DIMESYLATE 20 MG/1
20 CAPSULE ORAL EVERY MORNING
Qty: 30 CAPSULE | Refills: 0 | Status: SHIPPED | OUTPATIENT
Start: 2023-10-09 | End: 2023-11-22

## 2023-10-09 RX ORDER — LISDEXAMFETAMINE DIMESYLATE 20 MG/1
20 CAPSULE ORAL EVERY MORNING
Qty: 30 CAPSULE | Refills: 0 | Status: SHIPPED | OUTPATIENT
Start: 2023-12-08 | End: 2024-04-17

## 2023-10-09 RX ORDER — LISDEXAMFETAMINE DIMESYLATE 20 MG/1
20 CAPSULE ORAL EVERY MORNING
Qty: 30 CAPSULE | Refills: 0 | Status: SHIPPED | OUTPATIENT
Start: 2023-11-08 | End: 2024-04-17

## 2023-10-09 ASSESSMENT — ENCOUNTER SYMPTOMS
EYE PAIN: 0
SORE THROAT: 0
ARTHRALGIAS: 0
HEMATURIA: 0
BREAST MASS: 0
CHILLS: 0
NERVOUS/ANXIOUS: 0
DIARRHEA: 0
WEAKNESS: 0
COUGH: 0
NAUSEA: 0
HEMATOCHEZIA: 0
JOINT SWELLING: 0
ABDOMINAL PAIN: 0
HEADACHES: 1
PARESTHESIAS: 0
FEVER: 0
DIZZINESS: 0
SHORTNESS OF BREATH: 0
HEARTBURN: 1
DYSURIA: 0
MYALGIAS: 0
FREQUENCY: 0
CONSTIPATION: 0
PALPITATIONS: 0

## 2023-10-09 ASSESSMENT — ANXIETY QUESTIONNAIRES
GAD7 TOTAL SCORE: 12
2. NOT BEING ABLE TO STOP OR CONTROL WORRYING: MORE THAN HALF THE DAYS
5. BEING SO RESTLESS THAT IT IS HARD TO SIT STILL: NOT AT ALL
4. TROUBLE RELAXING: MORE THAN HALF THE DAYS
3. WORRYING TOO MUCH ABOUT DIFFERENT THINGS: MORE THAN HALF THE DAYS
7. FEELING AFRAID AS IF SOMETHING AWFUL MIGHT HAPPEN: MORE THAN HALF THE DAYS
IF YOU CHECKED OFF ANY PROBLEMS ON THIS QUESTIONNAIRE, HOW DIFFICULT HAVE THESE PROBLEMS MADE IT FOR YOU TO DO YOUR WORK, TAKE CARE OF THINGS AT HOME, OR GET ALONG WITH OTHER PEOPLE: SOMEWHAT DIFFICULT
GAD7 TOTAL SCORE: 12
1. FEELING NERVOUS, ANXIOUS, OR ON EDGE: MORE THAN HALF THE DAYS
6. BECOMING EASILY ANNOYED OR IRRITABLE: MORE THAN HALF THE DAYS

## 2023-10-09 ASSESSMENT — PATIENT HEALTH QUESTIONNAIRE - PHQ9
SUM OF ALL RESPONSES TO PHQ QUESTIONS 1-9: 10
10. IF YOU CHECKED OFF ANY PROBLEMS, HOW DIFFICULT HAVE THESE PROBLEMS MADE IT FOR YOU TO DO YOUR WORK, TAKE CARE OF THINGS AT HOME, OR GET ALONG WITH OTHER PEOPLE: SOMEWHAT DIFFICULT
SUM OF ALL RESPONSES TO PHQ QUESTIONS 1-9: 10

## 2023-10-09 ASSESSMENT — PAIN SCALES - GENERAL: PAINLEVEL: NO PAIN (0)

## 2023-10-09 NOTE — RESULT ENCOUNTER NOTE
Test results discussed with patient during office visit.     Sadia Reagan PA-C  [FreeTextEntry1] : Rash [de-identified] : Follow-up visit for 70-year-old white male last seen by me on November 30, 2022, with a long history of a rash diagnosed by me as a psoriasiform eruption.  Rash has been biopsied twice.\par First biopsy read as a "psoriasiform dermatitis with eosinophils"\par Second biopsy done on November 30, 2022: Read as\par "Psoriasiform dermatitis with intracorneal neutrophils and numerous dermal eosinophils . . . A psoriasiform drug eruption is favored.\par Differential diagnosis includes psoriasis with superimposed dermal hypersensitivity reaction.\par \par Patient has seen an allergist with an IgE of 2455 (6–495).\par Had positive reaction to several allergens.\par \par Treated with:\par Resume clobetasol foam 0.05% to affected areas once or twice a day -helps while he uses this but ran out.\par Continue triamcinolone cream 0.1% to affected areas once or twice a day\par \par Lab work from December 20, 2022:\par \par Hemoglobin–15.0\par Hematocrit–45.3\par WBC–10.37 -no eosinophilia present\par Platelets–231\par \par Comprehensive metabolic panel:\par \par Total bilirubin 1.8 (0.2–1.2\par AST–269 (10–40)\par ALT–108 (10–45)\par Alkaline phosphatase–79 (40–1 20)\par \par Nonfasting glucose–102 (70–99)\par \par Elevated liver function test may be secondary to alcohol

## 2023-10-09 NOTE — PROGRESS NOTES
SUBJECTIVE:   CC: Inessa is an 38 year old who presents for preventive health visit.       10/9/2023    11:15 AM   Additional Questions   Roomed by Nya   Accompanied by Self         10/9/2023    11:15 AM   Patient Reported Additional Medications   Patient reports taking the following new medications NA       Healthy Habits:     Getting at least 3 servings of Calcium per day:  Yes    Bi-annual eye exam:  Yes    Dental care twice a year:  Yes    Sleep apnea or symptoms of sleep apnea:  Daytime drowsiness    Diet:  Regular (no restrictions)    Frequency of exercise:  2-3 days/week    Duration of exercise:  15-30 minutes    Taking medications regularly:  Yes    Barriers to taking medications:  None    Medication side effects:  None    Additional concerns today:  No    The patient is a 38-year-old female who is here for medication recheck and physical.    She is doing well on the Viibryd except for some headaches right away. Her symptoms are better than before since her last visit.    She is taking prenatal gummies. She has not noticed any changes on the Topamax or naltrexone. She likes the Vyvanse dosage change. She is sleeping well. She has decreased the naltrexone to 20 mg every day for about a month. She takes it on the weekends. She does not drink 3 energy drinks. She is due for her period tomorrow. She took a home pregnancy test 5 to 6 days prior and it was negative.      Today's PHQ-9 Score:       10/9/2023     8:43 AM   PHQ-9 SCORE   PHQ-9 Total Score MyChart 10 (Moderate depression)   PHQ-9 Total Score 10       Depression and Anxiety Follow-Up  How are you doing with your depression since your last visit? Improved   How are you doing with your anxiety since your last visit?  Improved   Are you having other symptoms that might be associated with depression or anxiety? No  Have you had a significant life event? No   Do you have any concerns with your use of alcohol or other drugs? No    Social History      Tobacco Use    Smoking status: Never    Smokeless tobacco: Never   Vaping Use    Vaping Use: Never used   Substance Use Topics    Alcohol use: Yes     Comment: Occasionally    Drug use: Not Currently     Types: Cocaine, Marijuana, Methamphetamines     Comment: [id number] 8/17/2023     2:25 PM 8/21/2023    11:25 AM 10/9/2023     8:43 AM   PHQ   PHQ-9 Total Score 5 6 10   Q9: Thoughts of better off dead/self-harm past 2 weeks Not at all Not at all Not at all         6/6/2023    10:47 AM 8/17/2023     2:24 PM 10/9/2023     8:43 AM   JOEL-7 SCORE   Total Score 6 (mild anxiety) 5 (mild anxiety) 12 (moderate anxiety)   Total Score 6 5 12         10/9/2023     8:43 AM   Last PHQ-9   1.  Little interest or pleasure in doing things 2   2.  Feeling down, depressed, or hopeless 2   3.  Trouble falling or staying asleep, or sleeping too much 2   4.  Feeling tired or having little energy 2   5.  Poor appetite or overeating 0   6.  Feeling bad about yourself 0   7.  Trouble concentrating 2   8.  Moving slowly or restless 0   Q9: Thoughts of better off dead/self-harm past 2 weeks 0   PHQ-9 Total Score 10         10/9/2023     8:43 AM   JOEL-7    1. Feeling nervous, anxious, or on edge 2   2. Not being able to stop or control worrying 2   3. Worrying too much about different things 2   4. Trouble relaxing 2   5. Being so restless that it is hard to sit still 0   6. Becoming easily annoyed or irritable 2   7. Feeling afraid, as if something awful might happen 2   JOEL-7 Total Score 12   If you checked any problems, how difficult have they made it for you to do your work, take care of things at home, or get along with other people? Somewhat difficult       Migraine   Since your last clinic visit, how have your headaches changed?  No change  How often are you getting headaches or migraines? Migraines: 1 time every other month, Headache: 1 time per week   Are you able to do normal daily activities when you have a migraine?  No  Are you taking rescue/relief medications? (Select all that apply) No  How helpful is your rescue/relief medication?  I get total relief  Are you taking any medications to prevent migraines? (Select all that apply)  No  In the past 4 weeks, how often have you gone to urgent care or the emergency room because of your headaches?  0  Have you ever done Advance Care Planning? (For example, a Health Directive, POLST, or a discussion with a medical provider or your loved ones about your wishes): No, advance care planning information given to patient to review.  Patient plans to discuss their wishes with loved ones or provider.      Social History     Tobacco Use    Smoking status: Never    Smokeless tobacco: Never   Substance Use Topics    Alcohol use: Yes     Comment: Occasionally           10/9/2023     8:45 AM   Alcohol Use   Prescreen: >3 drinks/day or >7 drinks/week? No     Reviewed orders with patient.  Reviewed health maintenance and updated orders accordingly - Yes  Lab work is in process  Labs reviewed in EPIC  BP Readings from Last 3 Encounters:   10/09/23 100/68   08/21/23 102/68   07/24/23 135/88    Wt Readings from Last 3 Encounters:   10/09/23 73.9 kg (163 lb)   08/21/23 73.9 kg (163 lb)   05/12/23 73.9 kg (163 lb)                    Breast Cancer Screening:        10/9/2023     8:47 AM   Breast CA Risk Assessment (FHS-7)   Do you have a family history of breast, colon, or ovarian cancer? No / Unknown       Patient under 40 years of age: Routine Mammogram Screening not recommended.   Pertinent mammograms are reviewed under the imaging tab.    History of abnormal Pap smear: NO - age 30-65 PAP every 5 years with negative HPV co-testing recommended      Latest Ref Rng & Units 3/14/2022     9:27 AM 10/19/2020     5:20 PM 10/19/2020    10:11 AM   PAP / HPV   PAP  Negative for Intraepithelial Lesion or Malignancy (NILM)      PAP (Historical)    NIL    HPV 16 DNA Negative Negative  Negative     HPV 18 DNA  Negative Negative  Negative     Other HR HPV Negative Negative  Negative       Reviewed and updated as needed this visit by clinical staff   Tobacco  Allergies  Meds  Problems  Med Hx  Surg Hx  Fam Hx          Reviewed and updated as needed this visit by Provider   Tobacco  Allergies  Meds  Problems  Med Hx  Surg Hx  Fam Hx         Past Medical History:   Diagnosis Date    Depressive disorder     History of colposcopy with cervical biopsy 12/10/2019    see problem list    Papanicolaou smear of cervix with low grade squamous intraepithelial lesion (LGSIL) 10/30/2013    Thyroid disease       Past Surgical History:   Procedure Laterality Date    ABDOMEN SURGERY  1/30/2022    C section    ESOPHAGOSCOPY, GASTROSCOPY, DUODENOSCOPY (EGD), COMBINED N/A 07/24/2023    Procedure: ESOPHAGOGASTRODUODENOSCOPY, WITH BIOPSY;  Surgeon: James Cook DO;  Location: [facility]    LEEP TX, CERVICAL  07/22/2014       Review of Systems   Constitutional:  Negative for chills and fever.   HENT:  Negative for congestion, ear pain, hearing loss and sore throat.    Eyes:  Negative for pain and visual disturbance.   Respiratory:  Negative for cough and shortness of breath.    Cardiovascular:  Negative for chest pain, palpitations and peripheral edema.   Gastrointestinal:  Positive for heartburn. Negative for abdominal pain, constipation, diarrhea, hematochezia and nausea.   Breasts:  Negative for tenderness, breast mass and discharge.   Genitourinary:  Negative for dysuria, frequency, genital sores, hematuria, pelvic pain, urgency, vaginal bleeding and vaginal discharge.   Musculoskeletal:  Negative for arthralgias, joint swelling and myalgias.   Skin:  Negative for rash.   Neurological:  Positive for headaches. Negative for dizziness, weakness and paresthesias.   Psychiatric/Behavioral:  Negative for mood changes. The patient is not nervous/anxious.           OBJECTIVE:   /68   Pulse 74   Temp 98.8  F (37.1  C)  "(Temporal)   Resp 20   Ht 1.6 m (5' 2.99\")   Wt 73.9 kg (163 lb)   LMP 09/14/2023 (Approximate)   SpO2 98%   BMI 28.88 kg/m    Physical Exam  Constitutional:       General: She is not in acute distress.     Appearance: She is well-developed.   HENT:      Right Ear: External ear normal. No swelling or tenderness. No middle ear effusion. There is no impacted cerumen. Tympanic membrane is not injected, erythematous or bulging.      Left Ear: External ear normal. No swelling or tenderness.  No middle ear effusion. There is no impacted cerumen. Tympanic membrane is not injected, erythematous or bulging.      Nose: Nose normal. No nasal tenderness, mucosal edema, congestion or rhinorrhea.      Right Turbinates: Not enlarged or swollen.      Left Turbinates: Not enlarged or swollen.      Mouth/Throat:      Lips: Pink. No lesions.      Mouth: Mucous membranes are moist. No oral lesions.      Dentition: Normal dentition.      Tongue: No lesions. Tongue does not deviate from midline.      Pharynx: Oropharynx is clear. No pharyngeal swelling, oropharyngeal exudate or posterior oropharyngeal erythema.      Tonsils: No tonsillar exudate.   Eyes:      General: Lids are normal.         Right eye: No discharge.         Left eye: No discharge.      Conjunctiva/sclera: Conjunctivae normal.      Pupils: Pupils are equal, round, and reactive to light.   Neck:      Thyroid: No thyroid mass or thyromegaly.      Vascular: No JVD.      Trachea: No tracheal deviation.   Cardiovascular:      Rate and Rhythm: Normal rate and regular rhythm.      Pulses: Normal pulses.      Heart sounds: Normal heart sounds. No murmur heard.     No friction rub. No gallop.   Pulmonary:      Effort: Pulmonary effort is normal. No respiratory distress.      Breath sounds: Normal breath sounds. No stridor. No wheezing or rales.   Chest:   Breasts:     Breasts are symmetrical.      Right: No inverted nipple, mass, nipple discharge or skin change.      Left: " No inverted nipple, mass, nipple discharge or skin change.   Abdominal:      General: Bowel sounds are normal. There is no distension.      Palpations: Abdomen is soft. There is no hepatomegaly, splenomegaly or mass.      Tenderness: There is no abdominal tenderness. There is no guarding or rebound.      Hernia: No hernia is present.   Genitourinary:     General: Normal vulva.      Vagina: Normal. No vaginal discharge, erythema or tenderness.      Cervix: No cervical motion tenderness, discharge or lesion.      Uterus: Normal. Not enlarged and not tender.       Adnexa: Right adnexa normal and left adnexa normal.        Right: No mass, tenderness or fullness.          Left: No mass, tenderness or fullness.     Musculoskeletal:         General: Normal range of motion.      Cervical back: Normal range of motion and neck supple.      Right lower leg: No edema.      Left lower leg: No edema.   Lymphadenopathy:      Cervical: No cervical adenopathy.      Upper Body:      Right upper body: No supraclavicular or axillary adenopathy.      Left upper body: No supraclavicular or axillary adenopathy.   Skin:     General: Skin is warm and dry.   Neurological:      Mental Status: She is alert and oriented to person, place, and time.   Psychiatric:         Behavior: Behavior normal.         Thought Content: Thought content normal.         Judgment: Judgment normal.           Diagnostic Test Results:  Labs reviewed in Epic  See orders pending in Epic     ASSESSMENT/PLAN:       ICD-10-CM    1. Attention deficit hyperactivity disorder (ADHD), predominantly inattentive type - diagnosed in college  F90.0 lisdexamfetamine (VYVANSE) 20 MG capsule     lisdexamfetamine (VYVANSE) 20 MG capsule      2. Routine general medical examination at a health care facility  Z00.00       3. Cervical cancer screening  Z12.4 Pap screen with HPV - recommended age 30 - 65 years      4. Moderate episode of recurrent major depressive disorder (H)  F33.1  "methylfolate (DEPLIN) 15 MG TABS tablet     vilazodone (VIIBRYD) 10 MG TABS tablet     OFFICE/OUTPT VISIT,EST,LEVL IV      5. JOEL (generalized anxiety disorder)  F41.1 methylfolate (DEPLIN) 15 MG TABS tablet     vilazodone (VIIBRYD) 10 MG TABS tablet     OFFICE/OUTPT VISIT,EST,LEVL IV      6. Missed period  N92.6 HCG qualitative urine     HCG qualitative urine     OFFICE/OUTPT VISIT,EST,LEVL IV      7. Encounter for immunization  Z23 INFLUENZA VACCINE >6 MONTHS (AFLURIA/FLUZONE)          - Patient and  trying to have another baby     Due for period in next few days, will get Beta HCG prior to doing exam and immunization      This was negative     - Reports mood markedly improved with start of Viibryd after [facility] testing      Reviewed testing again      Recommend starting methylated folate      Will leave dose of Viibryd alone for now       Consider stable      Reviewed use and side effects     - Previously, hadn't been taking because doesn't like way it makes her feel, decrease to 20 mg   - Feels this has gone very well, able to take it every day   -  reviewed, no concerns   - CSA 12/15/22, discussed and re-signed today 10/9/23   - Utox 12/15/22 - no concerns, will update today, await results   - Recheck 3 months       Patient has been advised of split billing requirements and indicates understanding: Yes      COUNSELING:  Reviewed preventive health counseling, as reflected in patient instructions  Special attention given to:        Regular exercise       Healthy diet/nutrition       Immunizations  Vaccinated for: Influenza    BMI:  Estimated body mass index is 28.88 kg/m  as calculated from the following:    Height as of this encounter: 1.6 m (5' 2.99\").    Weight as of this encounter: 73.9 kg (163 lb).   Weight management plan: Discussed healthy diet and exercise guidelines      She reports that she has never smoked. She has never used smokeless tobacco.    Review of the result(s) of each unique test " - See list       Diagnosis or treatment significantly limited by social determinants of health - None     36 minutes spent on the date of the encounter doing chart review, history and exam, documentation and further activities as noted above      Sadia Reagan PA-C  [facility]

## 2023-10-09 NOTE — PATIENT INSTRUCTIONS
- Add in Folic acid     - Topamax - decrease by 1 tablet every week until gone     - Stop Naltrexone     - Recheck 3 months         Preventive Health Recommendations  Female Ages 26 - 39  Yearly exam:   See your health care provider every year in order to  Review health changes.   Discuss preventive care.    Review your medicines if you your doctor has prescribed any.    Until age 30: Get a Pap test every three years (more often if you have had an abnormal result).    After age 30: Talk to your doctor about whether you should have a Pap test every 3 years or have a Pap test with HPV screening every 5 years.   You do not need a Pap test if your uterus was removed (hysterectomy) and you have not had cancer.  You should be tested each year for STDs (sexually transmitted diseases), if you're at risk.   Talk to your provider about how often to have your cholesterol checked.  If you are at risk for diabetes, you should have a diabetes test (fasting glucose).  Shots: Get a flu shot each year. Get a tetanus shot every 10 years.   Nutrition:   Eat at least 5 servings of fruits and vegetables each day.  Eat whole-grain bread, whole-wheat pasta and brown rice instead of white grains and rice.  Get adequate Calcium and Vitamin D.     Lifestyle  Exercise at least 150 minutes a week (30 minutes a day, 5 days of the week). This will help you control your weight and prevent disease.  Limit alcohol to one drink per day.  No smoking.   Wear sunscreen to prevent skin cancer.  See your dentist every six months for an exam and cleaning.

## 2023-10-09 NOTE — LETTER
[employer] RYLEE [place]  10/09/23  Patient: Inessa Cardenas  YOB: 1985  Medical Record Number: [id number]                                                                                  Non-Opioid Controlled Substance Agreement    This is an agreement between you and your provider regarding safe and appropriate use of controlled substances prescribed by your care team. Controlled substances are?medicines that can cause physical and mental dependence (abuse).     There are strict laws about having and using these medicines. We here at [facility] are  committed to working with you in your efforts to get better. To support you in this work, we'll help you schedule regular office appointments for medicine refills. If we must cancel or change your appointment for any reason, we'll make sure you have enough medicine to last until your next appointment.     As a Provider, I will:   Listen carefully to your concerns while treating you with respect.   Recommend a treatment plan that I believe is in your best interest and may involve therapies other than medicine.    Talk with you often about the possible benefits and the risk of harm of any medicine that we prescribe for you.  Assess the safety of this medicine and check how well it works.    Provide a plan on how to taper (discontinue or go off) using this medicine if the decision is made to stop its use.      ::  As a Patient, I understand controlled substances:     Are prescribed by my care provider to help me function or work and manage my condition(s).?  Are strong medicines and can cause serious side effects.     Need to be taken exactly as prescribed.?Combining controlled substances with certain medicines or chemicals (such as illegal drugs, alcohol, sedatives, sleeping pills, and benzodiazepines) can be dangerous or even fatal.? If I stop taking my medicines suddenly, I may have severe withdrawal symptoms.     The risks, benefits,  and side effects of these medicine(s) were explained to me. I agree that:    I will take part in other treatments as advised by my care team. This may be psychiatry or counseling, physical therapy, behavioral therapy, group treatment or a referral to specialist.    I will keep all my appointments and understand this is part of the monitoring of controlled substances.?My care team may require an office visit for EVERY controlled substance refill. If I miss appointments or don t follow instructions, my care team may stop my medicine    I will take my medicines as prescribed. I will not change the dose or schedule unless my care team tells me to. There will be no refills if I run out early.      I may be asked to come to the clinic and complete a urine drug test or complete a pill count. If I don t give a urine sample or participate in a pill count, the care team may stop my medicine.    I will only receive controlled substance prescriptions from this clinic. If I am treated by another provider, I will tell them that I am taking controlled substances and that I have a treatment agreement with this provider. I will inform my [facility] care team within one business day if I am given a prescription for any controlled substance by another healthcare provider. My [facility] care team can contact other providers and pharmacists about my use of any medicines.    It is up to me to make sure that I don't run out of my medicines on weekends or holidays.?If my care team is willing to refill my prescription without a visit, I must request refills only during office hours. Refills may take up to 3 business days to process. I will use one pharmacy to fill all my controlled substance prescriptions. I will notify the clinic about any changes to my insurance or medicine availability.    I am responsible for my prescriptions. If the medicine/prescription is lost, stolen or destroyed, it will not be replaced.?I also agree  not to share controlled substance medicines with anyone.     I am aware I should not use any illegal or recreational drugs. I agree not to drink alcohol unless my care team says I can.     If I enroll in the [place] Medical Cannabis program, I will tell my care team before my next refill.    I will tell my care team right away if I become pregnant, have a new medical problem treated outside of my regular clinic, or have a change in my medicines.     I understand that this medicine can affect my thinking, judgment and reaction time.? Alcohol and drugs affect the brain and body, which can affect the safety of my driving. Being under the influence of alcohol or drugs can affect my decision-making, behaviors, personal safety and the safety of others. Driving while impaired (DWI) can occur if a person is driving, operating or in physical control of a car, motorcycle, boat, snowmobile, ATV, motorbike, off-road vehicle or any other motor vehicle ([place] Statute 169A.20). I understand the risk if I choose to drive or operate any vehicle or machinery.    I understand that if I do not follow any of the conditions above, my prescriptions or treatment may be stopped or changed.   I agree that my provider, clinic care team and pharmacy may work with any city, state or federal law enforcement agency that investigates the misuse, sale or other diversion of my controlled medicine. I will allow my provider to discuss my care with, or share a copy of, this agreement with any other treating provider, pharmacy or emergency room where I receive care.     I have read this agreement and have asked questions about anything I did not understand.    ________________________________________________________  Patient Signature - Inessa Cardenas     ___________________                   Date     ________________________________________________________  Provider Signature - Sadia Reagan PA-C       ___________________                    Date     ________________________________________________________  Witness Signature (required if provider not present while patient signing)          ___________________                   Date

## 2023-10-11 LAB
AMPHET UR CFM-MCNC: 862 NG/ML
AMPHET/CREAT UR: 798 NG/MG {CREAT}
BKR LAB AP GYN ADEQUACY: NORMAL
BKR LAB AP GYN INTERPRETATION: NORMAL
BKR LAB AP HPV REFLEX: NORMAL
BKR LAB AP LMP: NORMAL
BKR LAB AP PREVIOUS ABNORMAL: NORMAL
PATH REPORT.COMMENTS IMP SPEC: NORMAL
PATH REPORT.COMMENTS IMP SPEC: NORMAL
PATH REPORT.RELEVANT HX SPEC: NORMAL

## 2023-10-11 NOTE — RESULT ENCOUNTER NOTE
Fidelina Wylie    Your results were normal/as expected.     The results are attached for your review.       Darrell Reagan PA-C

## 2023-10-19 ENCOUNTER — MYC MEDICAL ADVICE (OUTPATIENT)
Dept: FAMILY MEDICINE | Facility: OTHER | Age: 38
End: 2023-10-19
Payer: COMMERCIAL

## 2023-10-19 DIAGNOSIS — B36.0 TINEA VERSICOLOR: Primary | ICD-10-CM

## 2023-10-23 RX ORDER — KETOCONAZOLE 20 MG/G
CREAM TOPICAL 2 TIMES DAILY
Qty: 60 G | Refills: 1 | Status: SHIPPED | OUTPATIENT
Start: 2023-10-23 | End: 2024-04-17

## 2023-10-25 ENCOUNTER — MYC MEDICAL ADVICE (OUTPATIENT)
Dept: FAMILY MEDICINE | Facility: OTHER | Age: 38
End: 2023-10-25
Payer: COMMERCIAL

## 2023-10-26 NOTE — TELEPHONE ENCOUNTER
Pt needs to sign. Asked via [employer] how she would like to receive them. Placed in TC hold bin for now.

## 2023-10-26 NOTE — TELEPHONE ENCOUNTER
Form completed and placed in MA task. Looks like needs patient signature before faxing.     Sadia Reagan PA-C

## 2023-11-22 ENCOUNTER — VIRTUAL VISIT (OUTPATIENT)
Dept: FAMILY MEDICINE | Facility: OTHER | Age: 38
End: 2023-11-22
Payer: COMMERCIAL

## 2023-11-22 DIAGNOSIS — E78.5 HYPERLIPIDEMIA LDL GOAL <100: ICD-10-CM

## 2023-11-22 DIAGNOSIS — E66.3 OVERWEIGHT (BMI 25.0-29.9): ICD-10-CM

## 2023-11-22 DIAGNOSIS — F41.1 GAD (GENERALIZED ANXIETY DISORDER): Primary | ICD-10-CM

## 2023-11-22 DIAGNOSIS — F33.1 MODERATE EPISODE OF RECURRENT MAJOR DEPRESSIVE DISORDER (H): ICD-10-CM

## 2023-11-22 DIAGNOSIS — R73.09 ELEVATED GLUCOSE: ICD-10-CM

## 2023-11-22 DIAGNOSIS — F90.0 ATTENTION DEFICIT HYPERACTIVITY DISORDER (ADHD), PREDOMINANTLY INATTENTIVE TYPE: ICD-10-CM

## 2023-11-22 PROCEDURE — 99214 OFFICE O/P EST MOD 30 MIN: CPT | Mod: VID | Performed by: PHYSICIAN ASSISTANT

## 2023-11-22 RX ORDER — LISDEXAMFETAMINE DIMESYLATE 20 MG/1
20 CAPSULE ORAL EVERY MORNING
Qty: 30 CAPSULE | Refills: 0 | Status: SHIPPED | OUTPATIENT
Start: 2024-01-07 | End: 2024-04-17

## 2023-11-22 RX ORDER — METFORMIN HCL 500 MG
500 TABLET, EXTENDED RELEASE 24 HR ORAL
Qty: 90 TABLET | Refills: 1 | Status: SHIPPED | OUTPATIENT
Start: 2023-11-22 | End: 2024-04-17

## 2023-11-22 ASSESSMENT — PATIENT HEALTH QUESTIONNAIRE - PHQ9
SUM OF ALL RESPONSES TO PHQ QUESTIONS 1-9: 4
10. IF YOU CHECKED OFF ANY PROBLEMS, HOW DIFFICULT HAVE THESE PROBLEMS MADE IT FOR YOU TO DO YOUR WORK, TAKE CARE OF THINGS AT HOME, OR GET ALONG WITH OTHER PEOPLE: SOMEWHAT DIFFICULT
SUM OF ALL RESPONSES TO PHQ QUESTIONS 1-9: 4

## 2023-11-22 ASSESSMENT — ANXIETY QUESTIONNAIRES
GAD7 TOTAL SCORE: 6
6. BECOMING EASILY ANNOYED OR IRRITABLE: SEVERAL DAYS
3. WORRYING TOO MUCH ABOUT DIFFERENT THINGS: SEVERAL DAYS
2. NOT BEING ABLE TO STOP OR CONTROL WORRYING: SEVERAL DAYS
4. TROUBLE RELAXING: SEVERAL DAYS
IF YOU CHECKED OFF ANY PROBLEMS ON THIS QUESTIONNAIRE, HOW DIFFICULT HAVE THESE PROBLEMS MADE IT FOR YOU TO DO YOUR WORK, TAKE CARE OF THINGS AT HOME, OR GET ALONG WITH OTHER PEOPLE: SOMEWHAT DIFFICULT
1. FEELING NERVOUS, ANXIOUS, OR ON EDGE: SEVERAL DAYS
5. BEING SO RESTLESS THAT IT IS HARD TO SIT STILL: NOT AT ALL
7. FEELING AFRAID AS IF SOMETHING AWFUL MIGHT HAPPEN: SEVERAL DAYS
GAD7 TOTAL SCORE: 6

## 2023-11-22 NOTE — PROGRESS NOTES
Inessa is a 38 year old who is being evaluated via a billable video visit.      How would you like to obtain your AVS? MyChart  If the video visit is dropped, the invitation should be resent by: Text to cell phone: [phone]  Will anyone else be joining your video visit? No      Assessment & Plan     ICD-10-CM    1. JOEL (generalized anxiety disorder)  F41.1       2. Moderate episode of recurrent major depressive disorder (H)  F33.1       3. Attention deficit hyperactivity disorder (ADHD), predominantly inattentive type - diagnosed in college  F90.0 lisdexamfetamine (VYVANSE) 20 MG capsule      4. Hyperlipidemia LDL goal <100  E78.5 metFORMIN (GLUCOPHAGE XR) 500 MG 24 hr tablet     Lipid panel reflex to direct LDL Fasting     Comprehensive metabolic panel (BMP + Alb, Alk Phos, ALT, AST, Total. Bili, TP)      5. Overweight (BMI 25.0-29.9)  E66.3 metFORMIN (GLUCOPHAGE XR) 500 MG 24 hr tablet     Comprehensive metabolic panel (BMP + Alb, Alk Phos, ALT, AST, Total. Bili, TP)      6. Elevated glucose  R73.09 metFORMIN (GLUCOPHAGE XR) 500 MG 24 hr tablet     Comprehensive metabolic panel (BMP + Alb, Alk Phos, ALT, AST, Total. Bili, TP)     Hemoglobin A1c        1 & 2. Mood   - Stable on Viibryd and very rare use of xanax      Reviewed use and side effects including sedation and addiction on Xanax     3. ADHD  - Reports decrease to Vyvanse 20 mg went really well   - Stable   -  reviewed, no concerns   - CSA 10/9/23   - Utox 10/9/23 - no concerns  - Recheck 3 months     4-6.   - Had biometric screening with work, showed slightly elevated glucose and very high cholesterol   - Both run in her family   - Would like to try Metformin to reduce her risks      Already working on diet and exercise   - Will start low dose 500 mg once a day     Reviewed use and side effects   - Recheck 2-3 months with fasting labs prior     Review of the result(s) of each unique test - See list        4/24/23 - Lipid    Diagnosis or treatment  significantly limited by social determinants of health - None     25 minutes spent on the date of the encounter doing chart review, history and exam, documentation and further activities as noted above    The patient indicates understanding of these issues and agrees with the plan.    Follow up: as above     GORDON Sampson [facility] RYLEE Wylie is a 38 year old, presenting for the following health issues:  Recheck Medication      History of Present Illness       Hyperlipidemia:  She presents for follow up of hyperlipidemia.   She is not taking medication to lower cholesterol. She is not having myalgia or other side effects to statin medications.    She eats 2-3 servings of fruits and vegetables daily.She consumes 0 sweetened beverage(s) daily.She exercises with enough effort to increase her heart rate 20 to 29 minutes per day.    She is taking medications regularly.     - Did biometrics through work      A1C 5.3      High cholesterol and diabetes in family, dad MI at 38      Stephen 10 lbs from last summer      Working on diet and exercise      Cholesterol done in June through work was higher than in our chart   - Friend is on Metformin      Would like to try it   - Rest feels stable      Mood stable   - Trying to get pregnant       Inessa is here today to recheck ADHD/ADD.    Updates since last visit: Evenings are better now on the lower dose     Routine for taking medicine, including time: 10 am   Time medicine wears off: 4 pm   Issues at school: NA   Issues at home: None   Control of symptoms: good     Side effects:  Headaches: No  Stomach aches: No  Irritability/mood swings: No  Difficulties with sleep: No  Social withdrawal: No  Unusual movements/tics: No  Decreased appetite: No    Other concerns: none           Review of Systems   Constitutional, HEENT, cardiovascular, pulmonary, gi and gu systems are negative, except as otherwise noted.      Objective        Vitals:  No vitals were obtained today due to virtual visit.    Physical Exam   GENERAL: Healthy, alert and no distress  EYES: Eyes grossly normal to inspection.  No discharge or erythema, or obvious scleral/conjunctival abnormalities.  RESP: No audible wheeze, cough, or visible cyanosis.  No visible retractions or increased work of breathing.    SKIN: Visible skin clear. No significant rash, abnormal pigmentation or lesions.  NEURO: Cranial nerves grossly intact.  Mentation and speech appropriate for age.  PSYCH: Mentation appears normal, affect normal/bright, judgement and insight intact, normal speech and appearance well-groomed.    Diagnostics: reviewed in Epic             Video-Visit Details    Type of service:  Video Visit   Originating Location (pt. Location): Home  Distant Location (provider location):  Off-site  Platform used for Video Visit: [employer]

## 2024-02-15 ENCOUNTER — MYC MEDICAL ADVICE (OUTPATIENT)
Dept: FAMILY MEDICINE | Facility: OTHER | Age: 39
End: 2024-02-15
Payer: COMMERCIAL

## 2024-04-16 ASSESSMENT — ANXIETY QUESTIONNAIRES
1. FEELING NERVOUS, ANXIOUS, OR ON EDGE: SEVERAL DAYS
7. FEELING AFRAID AS IF SOMETHING AWFUL MIGHT HAPPEN: NOT AT ALL
7. FEELING AFRAID AS IF SOMETHING AWFUL MIGHT HAPPEN: NOT AT ALL
3. WORRYING TOO MUCH ABOUT DIFFERENT THINGS: SEVERAL DAYS
5. BEING SO RESTLESS THAT IT IS HARD TO SIT STILL: SEVERAL DAYS
2. NOT BEING ABLE TO STOP OR CONTROL WORRYING: SEVERAL DAYS
GAD7 TOTAL SCORE: 7
GAD7 TOTAL SCORE: 7
4. TROUBLE RELAXING: SEVERAL DAYS
GAD7 TOTAL SCORE: 7
6. BECOMING EASILY ANNOYED OR IRRITABLE: MORE THAN HALF THE DAYS

## 2024-04-16 ASSESSMENT — PATIENT HEALTH QUESTIONNAIRE - PHQ9
SUM OF ALL RESPONSES TO PHQ QUESTIONS 1-9: 7
SUM OF ALL RESPONSES TO PHQ QUESTIONS 1-9: 7
10. IF YOU CHECKED OFF ANY PROBLEMS, HOW DIFFICULT HAVE THESE PROBLEMS MADE IT FOR YOU TO DO YOUR WORK, TAKE CARE OF THINGS AT HOME, OR GET ALONG WITH OTHER PEOPLE: SOMEWHAT DIFFICULT

## 2024-04-17 ENCOUNTER — VIRTUAL VISIT (OUTPATIENT)
Dept: FAMILY MEDICINE | Facility: OTHER | Age: 39
End: 2024-04-17
Payer: COMMERCIAL

## 2024-04-17 DIAGNOSIS — F41.1 GAD (GENERALIZED ANXIETY DISORDER): Primary | ICD-10-CM

## 2024-04-17 DIAGNOSIS — F33.1 MODERATE EPISODE OF RECURRENT MAJOR DEPRESSIVE DISORDER (H): ICD-10-CM

## 2024-04-17 PROBLEM — F40.243 FEAR OF FLYING: Status: RESOLVED | Noted: 2020-10-19 | Resolved: 2024-04-17

## 2024-04-17 PROBLEM — F19.21 DRUG DEPENDENCE, IN REMISSION (H): Status: RESOLVED | Noted: 2018-04-09 | Resolved: 2024-04-17

## 2024-04-17 PROCEDURE — 99214 OFFICE O/P EST MOD 30 MIN: CPT | Mod: 95 | Performed by: PHYSICIAN ASSISTANT

## 2024-04-17 RX ORDER — LEVOTHYROXINE SODIUM 25 UG/1
25 TABLET ORAL DAILY
COMMUNITY
End: 2024-09-26

## 2024-04-17 RX ORDER — VILAZODONE HYDROCHLORIDE 20 MG/1
20 TABLET ORAL DAILY
Qty: 90 TABLET | Refills: 1 | Status: SHIPPED | OUTPATIENT
Start: 2024-04-17 | End: 2024-09-26

## 2024-04-17 ASSESSMENT — ENCOUNTER SYMPTOMS: NERVOUS/ANXIOUS: 1

## 2024-04-17 NOTE — PROGRESS NOTES
"Instructions Relayed to Patient by Virtual Roomer:     Patient is active on [employer]:   Relayed following to patient: \"It looks like you are active on [employer], are you able to join the visit this way? If not, do you need us to send you a link now or would you like your provider to send a link via text or email when they are ready to initiate the visit?\"    Reminded patient to ensure they were logged on to virtual visit by arrival time listed. Documented in appointment notes if patient had flexibility to initiate visit sooner than arrival time. If pediatric virtual visit, ensured pediatric patient along with parent/guardian will be present for video visit.     Patient offered the website [email or web address] and/or phone number to [employer] Help line: [phone]      Inessa is a 38 year old who is being evaluated via a billable video visit.    How would you like to obtain your AVS? [employer]  If the video visit is dropped, the invitation should be resent by: Text to cell phone: [phone]  Will anyone else be joining your video visit? No      Assessment & Plan     ICD-10-CM    1. JOEL (generalized anxiety disorder)  F41.1 vilazodone (VIIBRYD) 20 MG TABS tablet      2. Moderate episode of recurrent major depressive disorder (H)  F33.1 vilazodone (VIIBRYD) 20 MG TABS tablet        - Patient is 19 weeks pregnant, due in Sept     Recently lost her job, but feeling more emotional even before that   - OB suggested she reach out and consider increase in Viibryd   - Will increase to 20 mg   - Reviewed use and side effects   - Recheck 3-6 months, as patient might be changing insurance will want to make sure she has enough medication until after birth of baby boy       Review of the result(s) of each unique test - PHQ9 & GAD7    Diagnosis or treatment significantly limited by social determinants of health - None     16 minutes spent on the date of the encounter doing chart review, history and exam, " documentation and further activities as noted above    The patient indicates understanding of these issues and agrees with the plan.    Follow up: 3-6 months     Darrell Lee-GORDON Machado  [facility] Harlan Wylie is a 38 year old, presenting for the following health issues:  Recheck Medication (Discuss mg increase for my antidepressant/anxiety from 10 to 20) and Anxiety      4/17/2024     8:21 AM   Additional Questions   Roomed by Cadence GRIFFITH   Accompanied by Self     Anxiety    History of Present Illness       Mental Health Follow-up:  Patient presents to follow-up on Depression & Anxiety.Patient's depression since last visit has been:  Worse  The patient is not having other symptoms associated with depression.  Patient's anxiety since last visit has been:  Medium  The patient is not having other symptoms associated with anxiety.  Any significant life events: job concerns and financial concerns  Patient is feeling anxious or having panic attacks.  Patient has no concerns about alcohol or drug use.    She eats 2-3 servings of fruits and vegetables daily.She consumes 1 sweetened beverage(s) daily.She exercises with enough effort to increase her heart rate 10 to 19 minutes per day.  She exercises with enough effort to increase her heart rate 7 days per week.   She is taking medications regularly.     - 19 weeks pregnant      Having boy - Sept 10th   - Lost job, lay offs due to money   - A little more crying   - OB ok with it      Restarted her Levothyroxine as well   - Just a lot of stress with trying to figure out insurance             10/9/2023     8:43 AM 11/22/2023     8:39 AM 4/16/2024     2:17 PM   PHQ   PHQ-9 Total Score 10 4 7   Q9: Thoughts of better off dead/self-harm past 2 weeks Not at all Not at all Not at all         10/9/2023     8:43 AM 11/22/2023     8:40 AM 4/16/2024     2:19 PM   JOEL-7 SCORE   Total Score 12 (moderate anxiety) 6 (mild anxiety) 7 (mild  anxiety)   Total Score 12 6 7         Review of Systems  Constitutional, neuro, ENT, endocrine, pulmonary, cardiac, gastrointestinal, genitourinary, musculoskeletal, integument and psychiatric systems are negative, except as otherwise noted.      Objective       Vitals:  No vitals were obtained today due to virtual visit.    Physical Exam   GENERAL: alert and no distress  EYES: Eyes grossly normal to inspection.  No discharge or erythema, or obvious scleral/conjunctival abnormalities.  RESP: No audible wheeze, cough, or visible cyanosis.    SKIN: Visible skin clear. No significant rash, abnormal pigmentation or lesions.  NEURO: Cranial nerves grossly intact.  Mentation and speech appropriate for age.  PSYCH: Appropriate affect, tone, and pace of words    Diagnostics: none       Video-Visit Details    Type of service:  Video Visit   Originating Location (pt. Location): Home  Distant Location (provider location):  Off-site  Platform used for Video Visit: [employer]  Signed Electronically by: Sadia Reagan PA-C

## 2024-09-25 ASSESSMENT — ANXIETY QUESTIONNAIRES
GAD7 TOTAL SCORE: 9
GAD7 TOTAL SCORE: 9
8. IF YOU CHECKED OFF ANY PROBLEMS, HOW DIFFICULT HAVE THESE MADE IT FOR YOU TO DO YOUR WORK, TAKE CARE OF THINGS AT HOME, OR GET ALONG WITH OTHER PEOPLE?: SOMEWHAT DIFFICULT
7. FEELING AFRAID AS IF SOMETHING AWFUL MIGHT HAPPEN: MORE THAN HALF THE DAYS
GAD7 TOTAL SCORE: 9

## 2024-09-25 ASSESSMENT — PATIENT HEALTH QUESTIONNAIRE - PHQ9
10. IF YOU CHECKED OFF ANY PROBLEMS, HOW DIFFICULT HAVE THESE PROBLEMS MADE IT FOR YOU TO DO YOUR WORK, TAKE CARE OF THINGS AT HOME, OR GET ALONG WITH OTHER PEOPLE: SOMEWHAT DIFFICULT
SUM OF ALL RESPONSES TO PHQ QUESTIONS 1-9: 3
SUM OF ALL RESPONSES TO PHQ QUESTIONS 1-9: 3

## 2024-09-26 ENCOUNTER — VIRTUAL VISIT (OUTPATIENT)
Dept: FAMILY MEDICINE | Facility: OTHER | Age: 39
End: 2024-09-26
Payer: COMMERCIAL

## 2024-09-26 DIAGNOSIS — E03.9 ACQUIRED HYPOTHYROIDISM: ICD-10-CM

## 2024-09-26 DIAGNOSIS — F41.1 GAD (GENERALIZED ANXIETY DISORDER): Primary | ICD-10-CM

## 2024-09-26 DIAGNOSIS — Z13.220 SCREENING FOR LIPOID DISORDERS: ICD-10-CM

## 2024-09-26 DIAGNOSIS — F90.0 ATTENTION DEFICIT HYPERACTIVITY DISORDER (ADHD), PREDOMINANTLY INATTENTIVE TYPE: ICD-10-CM

## 2024-09-26 DIAGNOSIS — F33.1 MODERATE EPISODE OF RECURRENT MAJOR DEPRESSIVE DISORDER (H): ICD-10-CM

## 2024-09-26 DIAGNOSIS — Z86.32 HX GESTATIONAL DIABETES: ICD-10-CM

## 2024-09-26 PROCEDURE — 99214 OFFICE O/P EST MOD 30 MIN: CPT | Mod: 95 | Performed by: PHYSICIAN ASSISTANT

## 2024-09-26 RX ORDER — LEVOTHYROXINE SODIUM 25 UG/1
25 TABLET ORAL DAILY
Qty: 90 TABLET | Refills: 1 | Status: SHIPPED | OUTPATIENT
Start: 2024-09-26

## 2024-09-26 RX ORDER — VILAZODONE HYDROCHLORIDE 20 MG/1
20 TABLET ORAL DAILY
Qty: 90 TABLET | Refills: 3 | Status: SHIPPED | OUTPATIENT
Start: 2024-09-26

## 2024-09-26 RX ORDER — LISDEXAMFETAMINE DIMESYLATE 20 MG/1
20 CAPSULE ORAL EVERY MORNING
Qty: 30 CAPSULE | Refills: 0 | Status: SHIPPED | OUTPATIENT
Start: 2024-09-26

## 2024-09-26 ASSESSMENT — ENCOUNTER SYMPTOMS: NERVOUS/ANXIOUS: 1

## 2024-09-26 NOTE — PROGRESS NOTES
Inessa is a 39 year old who is being evaluated via a billable video visit.    How would you like to obtain your AVS? MyChart  If the video visit is dropped, the invitation should be resent by: Text to cell phone: [phone]  Will anyone else be joining your video visit? No    Assessment & Plan     ICD-10-CM    1. JOEL (generalized anxiety disorder)  F41.1 vilazodone (VIIBRYD) 20 MG TABS tablet      2. Moderate episode of recurrent major depressive disorder (H)  F33.1 vilazodone (VIIBRYD) 20 MG TABS tablet      3. Acquired hypothyroidism  E03.9 TSH with free T4 reflex     levothyroxine (SYNTHROID/LEVOTHROID) 25 MCG tablet      4. Attention deficit hyperactivity disorder (ADHD), predominantly inattentive type - diagnosed in college  F90.0 lisdexamfetamine (VYVANSE) 20 MG capsule        1 & 2. Mood   - 4 weeks postpartum, doing very well, stayed on Viibryd 20 mg through pregnacy   - Consider stable   - Reviewed medication use and side effects, refilled     3. Thyroid   - Wasn't on medication, but was restarted during pregnancy Levothyroxine 25 mcg   - I am not able to see outside records for last lab, only January   - Recommend updating labs   - Results will be communicated to patient when available and appropriate medication changes made if necessary   - Reviewed medication use and side effects, refilled     4. Adhd  - Previously on Vyvanse 20 mg, was off for entire pregnancy   - Don't recommend restarting until after done monitoring BP for another few weeks, had pre-eclampsia    -  reviewed, no concerns   - Will send medication to restart once ready  - Reviewed use and side effects  - Should schedule in person once back on it for CSA and Utox     5.   - No medications, still checking BGs and have been good   - Wants her A1C rechecked, this was ordered     6. Discussed screening lab, await results         Review of the result(s) of each unique test - PHQ9 & GAD7       1/30/24 - TSH   Diagnosis or treatment  significantly limited by social determinants of health - none    20 minutes spent on the date of the encounter doing chart review, history and exam, documentation and further activities as noted above    The patient indicates understanding of these issues and agrees with the plan.    Follow up: 2-3 weeks or so after starting Vyvanse     Darrell Lee-GRODON Machado  [facility] - Harlan Wylie is a 39 year old, presenting for the following health issues:  Anxiety and Depression        2024    11:29 AM   Additional Questions   Roomed by shasha   Accompanied by self     History of Present Illness       Mental Health Follow-up:  Patient presents to follow-up on Depression & Anxiety.Patient's depression since last visit has been:  Good  The patient is not having other symptoms associated with depression.  Patient's anxiety since last visit has been:  Worse  The patient is not having other symptoms associated with anxiety.  Any significant life events: job concerns and other  Patient is feeling anxious or having panic attacks.  Patient has no concerns about alcohol or drug use.    She eats 2-3 servings of fruits and vegetables daily.She consumes 1 sweetened beverage(s) daily.She exercises with enough effort to increase her heart rate 10 to 19 minutes per day.  She exercises with enough effort to increase her heart rate 3 or less days per week.   She is taking medications regularly.     - Had baby, 1 month old tomorrow,  at 37 weeks   - Had gestational diabetes, HTN, pre-eclampsia       - Put back on thyroid medication in May     - Still checking BG's   - Just got off BP medications, still monitoring at home     - Still taking Viibryd      Some anxiety, but still doesn't have a job      Not breastfeeding     - Restarting Vyvanse in a few weeks? Once done monitoring BP             2023     8:39 AM 2024     2:17 PM 2024     7:48 PM   PHQ   PHQ-9 Total Score  4 7 3   Q9: Thoughts of better off dead/self-harm past 2 weeks Not at all Not at all Not at all          11/22/2023     8:40 AM 4/16/2024     2:19 PM 9/25/2024     7:50 PM   JOEL-7 SCORE   Total Score 6 (mild anxiety) 7 (mild anxiety) 9 (mild anxiety)   Total Score 6 7 9           Review of Systems  Constitutional, neuro, ENT, endocrine, pulmonary, cardiac, gastrointestinal, genitourinary, musculoskeletal, integument and psychiatric systems are negative, except as otherwise noted.      Objective       Vitals:  No vitals were obtained today due to virtual visit.    Physical Exam   GENERAL: alert and no distress  EYES: Eyes grossly normal to inspection.  No discharge or erythema, or obvious scleral/conjunctival abnormalities.  RESP: No audible wheeze, cough, or visible cyanosis.    SKIN: Visible skin clear. No significant rash, abnormal pigmentation or lesions.  NEURO: Cranial nerves grossly intact.  Mentation and speech appropriate for age.  PSYCH: Appropriate affect, tone, and pace of words    Diagnostics: reviewed in Epic, see orders pending in Epic           Video-Visit Details    Type of service:  Video Visit   Originating Location (pt. Location): Home  Distant Location (provider location):  On-site  Platform used for Video Visit: [employer]  Signed Electronically by: Sadia Lee-GORDON Machado

## 2024-10-01 ENCOUNTER — LAB (OUTPATIENT)
Dept: LAB | Facility: OTHER | Age: 39
End: 2024-10-01
Payer: COMMERCIAL

## 2024-10-01 DIAGNOSIS — E03.9 ACQUIRED HYPOTHYROIDISM: ICD-10-CM

## 2024-10-01 DIAGNOSIS — Z86.32 HX GESTATIONAL DIABETES: ICD-10-CM

## 2024-10-01 DIAGNOSIS — Z13.220 SCREENING FOR LIPOID DISORDERS: ICD-10-CM

## 2024-10-01 LAB
CHOLEST SERPL-MCNC: 283 MG/DL
EST. AVERAGE GLUCOSE BLD GHB EST-MCNC: 114 MG/DL
FASTING STATUS PATIENT QL REPORTED: YES
HBA1C MFR BLD: 5.6 % (ref 0–5.6)
HDLC SERPL-MCNC: 40 MG/DL
LDLC SERPL CALC-MCNC: 207 MG/DL
NONHDLC SERPL-MCNC: 243 MG/DL
T4 FREE SERPL-MCNC: 1.38 NG/DL (ref 0.9–1.7)
TRIGL SERPL-MCNC: 181 MG/DL
TSH SERPL DL<=0.005 MIU/L-ACNC: 0.06 UIU/ML (ref 0.3–4.2)

## 2024-10-01 PROCEDURE — 36415 COLL VENOUS BLD VENIPUNCTURE: CPT

## 2024-10-01 PROCEDURE — 83036 HEMOGLOBIN GLYCOSYLATED A1C: CPT

## 2024-10-01 PROCEDURE — 84443 ASSAY THYROID STIM HORMONE: CPT

## 2024-10-01 PROCEDURE — 80061 LIPID PANEL: CPT

## 2024-10-01 PROCEDURE — 84439 ASSAY OF FREE THYROXINE: CPT

## 2024-10-01 NOTE — RESULT ENCOUNTER NOTE
Fidelina Wylie    Your A1C results were normal. No signs of diabetes/prediabetes.    The results are attached for your review.       Darrell Reagan PA-C

## 2024-10-02 ENCOUNTER — VIRTUAL VISIT (OUTPATIENT)
Dept: FAMILY MEDICINE | Facility: OTHER | Age: 39
End: 2024-10-02
Payer: COMMERCIAL

## 2024-10-02 DIAGNOSIS — E66.3 OVERWEIGHT (BMI 25.0-29.9): ICD-10-CM

## 2024-10-02 DIAGNOSIS — E78.5 HYPERLIPIDEMIA LDL GOAL <100: ICD-10-CM

## 2024-10-02 DIAGNOSIS — E03.9 ACQUIRED HYPOTHYROIDISM: Primary | ICD-10-CM

## 2024-10-02 DIAGNOSIS — R73.09 ELEVATED GLUCOSE: ICD-10-CM

## 2024-10-02 PROCEDURE — 99214 OFFICE O/P EST MOD 30 MIN: CPT | Mod: 95 | Performed by: PHYSICIAN ASSISTANT

## 2024-10-02 RX ORDER — METFORMIN HYDROCHLORIDE 500 MG/1
500 TABLET, EXTENDED RELEASE ORAL
Qty: 90 TABLET | Refills: 1 | Status: SHIPPED | OUTPATIENT
Start: 2024-10-02

## 2024-10-02 NOTE — RESULT ENCOUNTER NOTE
Fidelina Wylie    Your results show TSH is low. So I don't think you need to be on the Levothyroxine at all. Let's recheck this in about 2 months. Cholesterol is high, will need to continue to monitor this. Work on a low fat diet, no medication at this time.     The results are attached for your review.       Darrell Reagan PA-C

## 2024-10-02 NOTE — PROGRESS NOTES
Inessa is a 39 year old who is being evaluated via a billable video visit.    How would you like to obtain your AVS? MyChart  If the video visit is dropped, the invitation should be resent by: Text to cell phone: [phone]  Will anyone else be joining your video visit? No    Assessment & Plan     ICD-10-CM    1. Acquired hypothyroidism  E03.9       2. Overweight (BMI 25.0-29.9)  E66.3 metFORMIN (GLUCOPHAGE XR) 500 MG 24 hr tablet      3. Hyperlipidemia LDL goal <100  E78.5 metFORMIN (GLUCOPHAGE XR) 500 MG 24 hr tablet      4. Elevated glucose  R73.09 metFORMIN (GLUCOPHAGE XR) 500 MG 24 hr tablet        Thyroid   - See previous visit note, was not on medication but restarted on Levothyroxine 25 mg during recent pregnancy  - Labs reviewed, TSH low but T4 normal   - As my result note stated, recommend discontinue Levothyroxine  - Will plan recheck of lab in 2 months     2. Weight   - Worried about this but post partum (not breastfeeding)   - Recommend waiting on restarting Topamax or Naltrexone for now     3. Lipids  - Highest that it has ever been, but eating a lot of fats due to avoiding carbs/sugar due to gestational diabetes   - Recommend no medication at this time  - Discussed low fat diet and monitoring     4. Elevated glucose  - Rechecked A1C due to gestational diabetes, not currently on medication but does have CGM in place   - A1C in normal range at 5.6   - Would like to restart something to help with glucose and weight      Was on Metformin, will restart this at 500 mg once daily     Reviewed use and side effects     - All patient's questions answered to the best of my ability     Review of the result(s) of each unique test - See list        Yesterday - TSH, T4, Lipid, A1C   Diagnosis or treatment significantly limited by social determinants of health - None    22 minutes spent on the date of the encounter doing chart review, history and exam, documentation and further activities as noted above    The  patient indicates understanding of these issues and agrees with the plan.    Follow up: 6 weeks     Darrell Reagan PA-C  [facility] - Harlan Wylie is a 39 year old, presenting for the following health issues:  labs    HPI     Go over labs   - Panicked when saw thyroid   - Never had that low   - Cholesterol high      Always been high, runs in the family      But never this high      Had to cut out sugar because gestational diabetes so lots of dairy     - Restart Naltrexone, Metformin, or T one (topiramate)     - Not breastfeeding, worries about weight and now cholesterol       Review of Systems  Constitutional, neuro, ENT, endocrine, pulmonary, cardiac, gastrointestinal, genitourinary, musculoskeletal, integument and psychiatric systems are negative, except as otherwise noted.      Objective       Vitals:  No vitals were obtained today due to virtual visit.    Physical Exam   GENERAL: alert and no distress  EYES: Eyes grossly normal to inspection.  No discharge or erythema, or obvious scleral/conjunctival abnormalities.  RESP: No audible wheeze, cough, or visible cyanosis.    SKIN: Visible skin clear. No significant rash, abnormal pigmentation or lesions.  NEURO: Cranial nerves grossly intact.  Mentation and speech appropriate for age.  PSYCH: Appropriate affect, tone, and pace of words    Diagnostics: reviewed in Epic         Video-Visit Details    Type of service:  Video Visit   Originating Location (pt. Location): Home  Distant Location (provider location):  Off-site  Platform used for Video Visit: [employer]  Signed Electronically by: Sadia Reagan PA-C

## 2024-11-14 ENCOUNTER — VIRTUAL VISIT (OUTPATIENT)
Dept: FAMILY MEDICINE | Facility: OTHER | Age: 39
End: 2024-11-14
Payer: COMMERCIAL

## 2024-11-14 DIAGNOSIS — Z86.32 HX GESTATIONAL DIABETES: ICD-10-CM

## 2024-11-14 DIAGNOSIS — F90.0 ATTENTION DEFICIT HYPERACTIVITY DISORDER (ADHD), PREDOMINANTLY INATTENTIVE TYPE: Primary | ICD-10-CM

## 2024-11-14 DIAGNOSIS — E66.3 OVERWEIGHT (BMI 25.0-29.9): ICD-10-CM

## 2024-11-14 PROCEDURE — 99214 OFFICE O/P EST MOD 30 MIN: CPT | Mod: 95 | Performed by: PHYSICIAN ASSISTANT

## 2024-11-14 RX ORDER — LISDEXAMFETAMINE DIMESYLATE 30 MG/1
30 CAPSULE ORAL EVERY MORNING
Qty: 30 CAPSULE | Refills: 0 | Status: SHIPPED | OUTPATIENT
Start: 2024-11-14

## 2024-11-14 ASSESSMENT — ANXIETY QUESTIONNAIRES
1. FEELING NERVOUS, ANXIOUS, OR ON EDGE: SEVERAL DAYS
7. FEELING AFRAID AS IF SOMETHING AWFUL MIGHT HAPPEN: SEVERAL DAYS
4. TROUBLE RELAXING: SEVERAL DAYS
3. WORRYING TOO MUCH ABOUT DIFFERENT THINGS: SEVERAL DAYS
7. FEELING AFRAID AS IF SOMETHING AWFUL MIGHT HAPPEN: SEVERAL DAYS
5. BEING SO RESTLESS THAT IT IS HARD TO SIT STILL: SEVERAL DAYS
2. NOT BEING ABLE TO STOP OR CONTROL WORRYING: SEVERAL DAYS
GAD7 TOTAL SCORE: 7
IF YOU CHECKED OFF ANY PROBLEMS ON THIS QUESTIONNAIRE, HOW DIFFICULT HAVE THESE PROBLEMS MADE IT FOR YOU TO DO YOUR WORK, TAKE CARE OF THINGS AT HOME, OR GET ALONG WITH OTHER PEOPLE: SOMEWHAT DIFFICULT
GAD7 TOTAL SCORE: 7
6. BECOMING EASILY ANNOYED OR IRRITABLE: SEVERAL DAYS
GAD7 TOTAL SCORE: 7
8. IF YOU CHECKED OFF ANY PROBLEMS, HOW DIFFICULT HAVE THESE MADE IT FOR YOU TO DO YOUR WORK, TAKE CARE OF THINGS AT HOME, OR GET ALONG WITH OTHER PEOPLE?: SOMEWHAT DIFFICULT

## 2024-11-14 NOTE — PROGRESS NOTES
Inessa is a 39 year old who is being evaluated via a billable video visit.    How would you like to obtain your AVS? MyChart  If the video visit is dropped, the invitation should be resent by: Text to cell phone: [phone]  Will anyone else be joining your video visit? No      Assessment & Plan     ICD-10-CM    1. Attention deficit hyperactivity disorder (ADHD), predominantly inattentive type - diagnosed in college  F90.0 lisdexamfetamine (VYVANSE) 30 MG capsule      2. Overweight (BMI 25.0-29.9)  E66.3       3. Hx gestational diabetes  Z86.32         1. ADHD  As the patient is unsure 20 mg of vyvanse is appropriate for her current symptoms, dose will be increased to 30 mg. Patient felt comfortable moving to this dose as she has been on it prior to her pregnancy. She was educated that this may also help with her weight loss goals.  Reviewed medication use and side effects    reviewed no concerns   Will need CSA and Utox once stable, this was scheduled for next month     2/3. Overweight/Gestation Diabetes History  As the patient has had normal fasting glucose levels in the morning along with symptoms of dizziness, metformin will be discontinued. This is appropriate as it was not helping with weight loss and patient has labs within normal range. Labs will be re-checked at next visit.       Review of the result(s) of each unique test - See list        10/1/24 - all labs   Diagnosis or treatment significantly limited by social determinants of health - None     20 minutes spent on the date of the encounter doing chart review, history and exam, documentation and further activities as noted above    The patient indicates understanding of these issues and agrees with the plan.    Follow up: 1 month     I, Darrell Reagan PA-C,  was present with the PA student who participated in the service and in the documentation of the note.  I have verified the history and personally performed the physical exam and  medical decision making.  I agree with the assessment and plan of care as documented in the note.     ZEFERINO BlakeS   [facility]     Darrell Reagan PA-C  [facility]   Inessa is a 39 year old, presenting for the following health issues:  Recheck Medication    History of Present Illness       Reason for visit:  Med follow up and lab    She eats 2-3 servings of fruits and vegetables daily.She consumes 1 sweetened beverage(s) daily.She exercises with enough effort to increase her heart rate 30 to 60 minutes per day.  She exercises with enough effort to increase her heart rate 5 days per week.   She is taking medications regularly.     Inessa is a pleasant 39-year-old presenting today for recheck of metformin and weight loss. Patient has not noticed too many changes on metformin. She is wondering if her blood sugars are low at night as she is feeling dizzy the last few days at night. Fasting glucose usually in the 80s in the mornings but she is only checking some days. She has slight stomach issues when she started metformin but that has subsided. Weight has stayed the same at 192 lbs. She started a strength program 5 days per week three weeks ago. She is getting 8000+ steps in per day. She has a personal goal to eat healthy with one salad a day and a lot of protein. She also started her vyvanse again at 20 mg and will need a refill in the next few days. She is unsure if the current dose is doing enough for her symptoms.     Review of Systems  Constitutional, neuro, ENT, endocrine, pulmonary, cardiac, gastrointestinal, genitourinary, musculoskeletal, integument and psychiatric systems are negative, except as otherwise noted.      Objective       Vitals:  No vitals were obtained today due to virtual visit.    Physical Exam   GENERAL: alert and no distress  EYES: Eyes grossly normal to inspection.  No discharge or erythema, or obvious  scleral/conjunctival abnormalities.  RESP: No audible wheeze, cough, or visible cyanosis.    SKIN: Visible skin clear. No significant rash, abnormal pigmentation or lesions.  NEURO: Cranial nerves grossly intact.  Mentation and speech appropriate for age.  PSYCH: Appropriate affect, tone, and pace of words    Diagnostics; reviewed in Epic, see orders pending in Epic           Video-Visit Details    Type of service:  Video Visit   Originating Location (pt. Location): Home  Distant Location (provider location):  On-site  Platform used for Video Visit: [employer]  Signed Electronically by: Sadia Reagan PA-C

## 2024-12-02 ENCOUNTER — MYC REFILL (OUTPATIENT)
Dept: FAMILY MEDICINE | Facility: OTHER | Age: 39
End: 2024-12-02
Payer: COMMERCIAL

## 2024-12-02 DIAGNOSIS — F33.1 MODERATE EPISODE OF RECURRENT MAJOR DEPRESSIVE DISORDER (H): ICD-10-CM

## 2024-12-02 DIAGNOSIS — F41.1 GAD (GENERALIZED ANXIETY DISORDER): ICD-10-CM

## 2024-12-02 RX ORDER — VILAZODONE HYDROCHLORIDE 20 MG/1
20 TABLET ORAL DAILY
Qty: 90 TABLET | Refills: 3 | OUTPATIENT
Start: 2024-12-02

## 2024-12-16 SDOH — HEALTH STABILITY: PHYSICAL HEALTH: ON AVERAGE, HOW MANY DAYS PER WEEK DO YOU ENGAGE IN MODERATE TO STRENUOUS EXERCISE (LIKE A BRISK WALK)?: 5 DAYS

## 2024-12-16 SDOH — HEALTH STABILITY: PHYSICAL HEALTH: ON AVERAGE, HOW MANY MINUTES DO YOU ENGAGE IN EXERCISE AT THIS LEVEL?: 30 MIN

## 2024-12-16 ASSESSMENT — PATIENT HEALTH QUESTIONNAIRE - PHQ9
SUM OF ALL RESPONSES TO PHQ QUESTIONS 1-9: 3
SUM OF ALL RESPONSES TO PHQ QUESTIONS 1-9: 3
10. IF YOU CHECKED OFF ANY PROBLEMS, HOW DIFFICULT HAVE THESE PROBLEMS MADE IT FOR YOU TO DO YOUR WORK, TAKE CARE OF THINGS AT HOME, OR GET ALONG WITH OTHER PEOPLE: SOMEWHAT DIFFICULT

## 2024-12-16 ASSESSMENT — ANXIETY QUESTIONNAIRES
GAD7 TOTAL SCORE: 8
8. IF YOU CHECKED OFF ANY PROBLEMS, HOW DIFFICULT HAVE THESE MADE IT FOR YOU TO DO YOUR WORK, TAKE CARE OF THINGS AT HOME, OR GET ALONG WITH OTHER PEOPLE?: SOMEWHAT DIFFICULT
7. FEELING AFRAID AS IF SOMETHING AWFUL MIGHT HAPPEN: SEVERAL DAYS
GAD7 TOTAL SCORE: 8
5. BEING SO RESTLESS THAT IT IS HARD TO SIT STILL: NOT AT ALL
6. BECOMING EASILY ANNOYED OR IRRITABLE: SEVERAL DAYS
1. FEELING NERVOUS, ANXIOUS, OR ON EDGE: SEVERAL DAYS
2. NOT BEING ABLE TO STOP OR CONTROL WORRYING: MORE THAN HALF THE DAYS
4. TROUBLE RELAXING: SEVERAL DAYS
7. FEELING AFRAID AS IF SOMETHING AWFUL MIGHT HAPPEN: SEVERAL DAYS
IF YOU CHECKED OFF ANY PROBLEMS ON THIS QUESTIONNAIRE, HOW DIFFICULT HAVE THESE PROBLEMS MADE IT FOR YOU TO DO YOUR WORK, TAKE CARE OF THINGS AT HOME, OR GET ALONG WITH OTHER PEOPLE: SOMEWHAT DIFFICULT
GAD7 TOTAL SCORE: 8
3. WORRYING TOO MUCH ABOUT DIFFERENT THINGS: MORE THAN HALF THE DAYS

## 2024-12-16 ASSESSMENT — SOCIAL DETERMINANTS OF HEALTH (SDOH): HOW OFTEN DO YOU GET TOGETHER WITH FRIENDS OR RELATIVES?: PATIENT DECLINED

## 2024-12-17 ENCOUNTER — OFFICE VISIT (OUTPATIENT)
Dept: FAMILY MEDICINE | Facility: OTHER | Age: 39
End: 2024-12-17
Payer: COMMERCIAL

## 2024-12-17 VITALS
WEIGHT: 198 LBS | RESPIRATION RATE: 14 BRPM | OXYGEN SATURATION: 97 % | SYSTOLIC BLOOD PRESSURE: 119 MMHG | BODY MASS INDEX: 35.08 KG/M2 | DIASTOLIC BLOOD PRESSURE: 83 MMHG | HEART RATE: 74 BPM | TEMPERATURE: 97.6 F

## 2024-12-17 DIAGNOSIS — E03.9 ACQUIRED HYPOTHYROIDISM: ICD-10-CM

## 2024-12-17 DIAGNOSIS — F90.0 ATTENTION DEFICIT HYPERACTIVITY DISORDER (ADHD), PREDOMINANTLY INATTENTIVE TYPE: ICD-10-CM

## 2024-12-17 DIAGNOSIS — Z00.00 ROUTINE GENERAL MEDICAL EXAMINATION AT A HEALTH CARE FACILITY: Primary | ICD-10-CM

## 2024-12-17 LAB
CREAT UR-MCNC: 90 MG/DL
T4 FREE SERPL-MCNC: 0.69 NG/DL (ref 0.9–1.7)
TSH SERPL DL<=0.005 MIU/L-ACNC: 5.95 UIU/ML (ref 0.3–4.2)

## 2024-12-17 PROCEDURE — 36415 COLL VENOUS BLD VENIPUNCTURE: CPT | Performed by: PHYSICIAN ASSISTANT

## 2024-12-17 PROCEDURE — 84439 ASSAY OF FREE THYROXINE: CPT | Performed by: PHYSICIAN ASSISTANT

## 2024-12-17 PROCEDURE — 84443 ASSAY THYROID STIM HORMONE: CPT | Performed by: PHYSICIAN ASSISTANT

## 2024-12-17 PROCEDURE — 99395 PREV VISIT EST AGE 18-39: CPT | Performed by: PHYSICIAN ASSISTANT

## 2024-12-17 RX ORDER — LISDEXAMFETAMINE DIMESYLATE 30 MG/1
30 CAPSULE ORAL EVERY MORNING
Qty: 30 CAPSULE | Refills: 0 | Status: SHIPPED | OUTPATIENT
Start: 2024-12-21

## 2024-12-17 ASSESSMENT — PAIN SCALES - GENERAL: PAINLEVEL_OUTOF10: NO PAIN (0)

## 2024-12-17 NOTE — PROGRESS NOTES
Preventive Care Visit  [facility]  Sadia Lee-GORDON Machado, Family Medicine  Dec 17, 2024    Assessment & Plan     ICD-10-CM    1. Routine general medical examination at a health care facility  Z00.00       2. Acquired hypothyroidism  E03.9 TSH with free T4 reflex     TSH with free T4 reflex      3. Attention deficit hyperactivity disorder (ADHD), predominantly inattentive type - diagnosed in [facility]  F90.0 Drug Confirmation Panel Urine with Creat - lab collect     lisdexamfetamine (VYVANSE) 30 MG capsule     Drug Confirmation Panel Urine with Creat - lab collect          Patient has been advised of split billing requirements and indicates understanding: Yes    Counseling  Appropriate preventive services were addressed with this patient via screening, questionnaire, or discussion as appropriate for fall prevention, nutrition, physical activity, Tobacco-use cessation, social engagement, weight loss and cognition.  Checklist reviewing preventive services available has been given to the patient.  Reviewed patient's diet, addressing concerns and/or questions.   She is at risk for psychosocial distress and has been provided with information to reduce risk.     2. Thyroid   - Able to lose 1 lb recently   - Was on medication while pregnant but not any longer, last labs 10/1/24 shows low TSH   - Will update labs today  - Results will be communicated to patient when available and appropriate medication changes made if necessary     3. ADHD   - Only tried the increased dose of Vyvanse once as she has been sick   - Will make no dose adjustments and allow her to try the 30 mg   - Recheck 1-2 months   - CSA discussed and signed today (12/17/24)   - Will update Utox, await results   -  reviewed, no concerns, patient often doesn't take on weekends       In addition to the preventive visit, 20  minutes of the appointment were spent evaluating and developing a treatment plan for her additional  "concern(s) - .      The patient indicates understanding of these issues and agrees with the plan.    Follow up: 1-2 months for ADHD recheck      Darrell Lee-GORDON Machado  [facility]           Ekaterina Wylie is a 39 year old, presenting for the following:  Physical        12/17/2024     9:55 AM   Additional Questions   Roomed by moshe   Accompanied by self          History of Present Illness       Headaches:   Since the patient's last clinic visit, headaches are: no change  The patient is getting headaches:  Headaches - 1/wk migraines 1/month  She is not able to do normal daily activities when she has a migraine.  The patient is taking the following rescue/relief medications:  Ibuprofen (Advil, Motrin), Tylenol and sumatriptan (Imitrex)   Patient states \"I get some relief\" from the rescue/relief medications.   The patient is taking the following medications to prevent migraines:  No medications to prevent migraines  In the past 4 weeks, the patient has gone to an Urgent Care or Emergency Room 0 times times due to headaches.     Vyvanse   - Only taken 1 dose of the increase dose because was sick     - Would like thyroid rechecked           Health Care Directive  Patient does not have a Health Care Directive: Discussed advance care planning with patient; however, patient declined at this time.      12/16/2024   General Health   How would you rate your overall physical health? (!) FAIR   Feel stress (tense, anxious, or unable to sleep) Rather much      (!) STRESS CONCERN      12/16/2024   Nutrition   Three or more servings of calcium each day? Yes   Diet: Regular (no restrictions)   How many servings of fruit and vegetables per day? (!) 2-3   How many sweetened beverages each day? 0-1            12/16/2024   Exercise   Days per week of moderate/strenous exercise 5 days   Average minutes spent exercising at this level 30 min            12/16/2024   Social Factors   Frequency of " gathering with friends or relatives Patient declined   Worry food won't last until get money to buy more No   Food not last or not have enough money for food? No   Do you have housing? (Housing is defined as stable permanent housing and does not include staying ouside in a car, in a tent, in an abandoned building, in an overnight shelter, or couch-surfing.) Yes   Are you worried about losing your housing? No   Lack of transportation? No   Unable to get utilities (heat,electricity)? No            12/16/2024   Dental   Dentist two times every year? Yes            12/16/2024   TB Screening   Were you born outside of the [place]? No          Today's PHQ-9 Score:       12/16/2024    10:02 PM   PHQ-9 SCORE   PHQ-9 Total Score MyChart 3 (Minimal depression)   PHQ-9 Total Score 3        Patient-reported         12/16/2024   Substance Use   Alcohol more than 3/day or more than 7/wk No   Do you use any other substances recreationally? No        Social History     Tobacco Use    Smoking status: Never    Smokeless tobacco: Never   Vaping Use    Vaping status: Never Used   Substance Use Topics    Alcohol use: Yes     Comment: Occasionally    Drug use: Not Currently     Types: Cocaine, Marijuana, Methamphetamines     Comment: [id number]     Mammogram Screening - Mammogram every 1-2 years updated in Health Maintenance based on mutual decision making        12/16/2024   STI Screening   New sexual partner(s) since last STI/HIV test? No        History of abnormal Pap smear: No - age 30- 64 PAP with HPV every 5 years recommended        Latest Ref Rng & Units 10/9/2023    12:16 PM 3/14/2022     9:27 AM 10/19/2020     5:20 PM   PAP / HPV   PAP  Negative for Intraepithelial Lesion or Malignancy (NILM)  Negative for Intraepithelial Lesion or Malignancy (NILM)     HPV 16 DNA Negative Negative  Negative  Negative    HPV 18 DNA Negative Negative  Negative  Negative    Other HR HPV Negative Negative  Negative  Negative            12/16/2024  "  Contraception/Family Planning   Questions about contraception or family planning No      Reviewed and updated as needed this visit by Provider   Tobacco  Allergies  Meds  Problems  Med Hx  Surg Hx  Fam Hx            Past Medical History:   Diagnosis Date    Depressive disorder     History of colposcopy with cervical biopsy 12/10/2019    see problem list    Hypertension 08/20/2024    Papanicolaou smear of cervix with low grade squamous intraepithelial lesion (LGSIL) 10/30/2013    Thyroid disease      Past Surgical History:   Procedure Laterality Date    ABDOMEN SURGERY  1/30/2022    C section    ESOPHAGOSCOPY, GASTROSCOPY, DUODENOSCOPY (EGD), COMBINED N/A 07/24/2023    Procedure: ESOPHAGOGASTRODUODENOSCOPY, WITH BIOPSY;  Surgeon: James Cook DO;  Location: [facility]    LEEP TX, CERVICAL  07/22/2014     Lab work is in process  Labs reviewed in EPIC  BP Readings from Last 3 Encounters:   12/17/24 119/83   10/09/23 100/68   08/21/23 102/68    Wt Readings from Last 3 Encounters:   12/17/24 89.8 kg (198 lb)   10/09/23 73.9 kg (163 lb)   08/21/23 73.9 kg (163 lb)                      Review of Systems  Constitutional, neuro, ENT, endocrine, pulmonary, cardiac, gastrointestinal, genitourinary, musculoskeletal, integument and psychiatric systems are negative, except as otherwise noted.     Objective    Exam  /83   Pulse 74   Temp 97.6  F (36.4  C) (Temporal)   Resp 14   Wt 89.8 kg (198 lb)   SpO2 97%   BMI 35.08 kg/m     Estimated body mass index is 35.08 kg/m  as calculated from the following:    Height as of 10/9/23: 1.6 m (5' 2.99\").    Weight as of this encounter: 89.8 kg (198 lb).    Physical Exam  Constitutional:       General: She is not in acute distress.     Appearance: She is well-developed.   HENT:      Right Ear: External ear normal. No middle ear effusion. There is no impacted cerumen. Tympanic membrane is not injected, perforated, erythematous or bulging.      Left Ear: External " ear normal.  No middle ear effusion. There is no impacted cerumen. Tympanic membrane is not injected, perforated, erythematous or bulging.      Nose: Nose normal. No mucosal edema or rhinorrhea.      Mouth/Throat:      Dentition: Normal dentition.      Tongue: No lesions. Tongue does not deviate from midline.      Palate: No lesions.      Pharynx: No pharyngeal swelling, oropharyngeal exudate or posterior oropharyngeal erythema.      Tonsils: No tonsillar exudate or tonsillar abscesses.   Eyes:      General: Lids are normal.         Right eye: No discharge.         Left eye: No discharge.      Conjunctiva/sclera: Conjunctivae normal.      Right eye: Right conjunctiva is not injected.      Left eye: Left conjunctiva is not injected.      Pupils: Pupils are equal, round, and reactive to light.   Neck:      Thyroid: No thyroid mass or thyromegaly.      Vascular: No JVD.      Trachea: Trachea normal. No tracheal deviation.   Cardiovascular:      Rate and Rhythm: Normal rate and regular rhythm.      Heart sounds: Normal heart sounds. No murmur heard.     No friction rub. No gallop.   Pulmonary:      Effort: Pulmonary effort is normal. No respiratory distress.      Breath sounds: Normal breath sounds. No stridor or decreased air movement. No wheezing, rhonchi or rales.   Abdominal:      General: Bowel sounds are normal. There is no distension.      Palpations: Abdomen is soft. There is no mass.      Tenderness: There is no abdominal tenderness. There is no guarding or rebound.      Hernia: No hernia is present.   Musculoskeletal:         General: Normal range of motion.      Cervical back: Normal range of motion and neck supple.   Lymphadenopathy:      Cervical: No cervical adenopathy.   Skin:     General: Skin is warm and dry.   Neurological:      Mental Status: She is alert and oriented to person, place, and time.   Psychiatric:         Behavior: Behavior normal.         Thought Content: Thought content normal.          Judgment: Judgment normal.         Diagnostics: Reviewed in Epic, see orders pending in Epic       Signed Electronically by: Sadia Reagan PA-C

## 2024-12-17 NOTE — PATIENT INSTRUCTIONS
Patient Education   Preventive Care Advice   This is general advice given by our system to help you stay healthy. However, your care team may have specific advice just for you. Please talk to your care team about your preventive care needs.  Nutrition  Eat 5 or more servings of fruits and vegetables each day.  Try wheat bread, brown rice and whole grain pasta (instead of white bread, rice, and pasta).  Get enough calcium and vitamin D. Check the label on foods and aim for 100% of the RDA (recommended daily allowance).  Lifestyle  Exercise at least 150 minutes each week  (30 minutes a day, 5 days a week).  Do muscle strengthening activities 2 days a week. These help control your weight and prevent disease.  No smoking.  Wear sunscreen to prevent skin cancer.  Have a dental exam and cleaning every 6 months.  Yearly exams  See your health care team every year to talk about:  Any changes in your health.  Any medicines your care team has prescribed.  Preventive care, family planning, and ways to prevent chronic diseases.  Shots (vaccines)   HPV shots (up to age 26), if you've never had them before.  Hepatitis B shots (up to age 59), if you've never had them before.  COVID-19 shot: Get this shot when it's due.  Flu shot: Get a flu shot every year.  Tetanus shot: Get a tetanus shot every 10 years.  Pneumococcal, hepatitis A, and RSV shots: Ask your care team if you need these based on your risk.  Shingles shot (for age 50 and up)  General health tests  Diabetes screening:  Starting at age 35, Get screened for diabetes at least every 3 years.  If you are younger than age 35, ask your care team if you should be screened for diabetes.  Cholesterol test: At age 39, start having a cholesterol test every 5 years, or more often if advised.  Bone density scan (DEXA): At age 50, ask your care team if you should have this scan for osteoporosis (brittle bones).  Hepatitis C: Get tested at least once in your life.  STIs (sexually  transmitted infections)  Before age 24: Ask your care team if you should be screened for STIs.  After age 24: Get screened for STIs if you're at risk. You are at risk for STIs (including HIV) if:  You are sexually active with more than one person.  You don't use condoms every time.  You or a partner was diagnosed with a sexually transmitted infection.  If you are at risk for HIV, ask about PrEP medicine to prevent HIV.  Get tested for HIV at least once in your life, whether you are at risk for HIV or not.  Cancer screening tests  Cervical cancer screening: If you have a cervix, begin getting regular cervical cancer screening tests starting at age 21.  Breast cancer scan (mammogram): If you've ever had breasts, begin having regular mammograms starting at age 40. This is a scan to check for breast cancer.  Colon cancer screening: It is important to start screening for colon cancer at age 45.  Have a colonoscopy test every 10 years (or more often if you're at risk) Or, ask your provider about stool tests like a FIT test every year or Cologuard test every 3 years.  To learn more about your testing options, visit:   .  For help making a decision, visit:   https://bit.ly/in77623.  Prostate cancer screening test: If you have a prostate, ask your care team if a prostate cancer screening test (PSA) at age 55 is right for you.  Lung cancer screening: If you are a current or former smoker ages 50 to 80, ask your care team if ongoing lung cancer screenings are right for you.  For informational purposes only. Not to replace the advice of your health care provider. Copyright   2023 [facility]. All rights reserved. Clinically reviewed by the [facility] Transitions Program. [employer] 264674 - REV 01/24.  Learning About Stress  What is stress?     Stress is your body's response to a hard situation. Your body can have a physical, emotional, or mental response. Stress is a fact of life for most people, and it  affects everyone differently. What causes stress for you may not be stressful for someone else.  A lot of things can cause stress. You may feel stress when you go on a job interview, take a test, or run a race. This kind of short-term stress is normal and even useful. It can help you if you need to work hard or react quickly. For example, stress can help you finish an important job on time.  Long-term stress is caused by ongoing stressful situations or events. Examples of long-term stress include long-term health problems, ongoing problems at work, or conflicts in your family. Long-term stress can harm your health.  How does stress affect your health?  When you are stressed, your body responds as though you are in danger. It makes hormones that speed up your heart, make you breathe faster, and give you a burst of energy. This is called the fight-or-flight stress response. If the stress is over quickly, your body goes back to normal and no harm is done.  But if stress happens too often or lasts too long, it can have bad effects. Long-term stress can make you more likely to get sick, and it can make symptoms of some diseases worse. If you tense up when you are stressed, you may develop neck, shoulder, or low back pain. Stress is linked to high blood pressure and heart disease.  Stress also harms your emotional health. It can make you sandoval, tense, or depressed. Your relationships may suffer, and you may not do well at work or school.  What can you do to manage stress?  You can try these things to help manage stress:   Do something active. Exercise or activity can help reduce stress. Walking is a great way to get started. Even everyday activities such as housecleaning or yard work can help.  Try yoga or wolf chi. These techniques combine exercise and meditation. You may need some training at first to learn them.  Do something you enjoy. For example, listen to music or go to a movie. Practice your hobby or do volunteer  "work.  Meditate. This can help you relax, because you are not worrying about what happened before or what may happen in the future.  Do guided imagery. Imagine yourself in any setting that helps you feel calm. You can use online videos, books, or a teacher to guide you.  Do breathing exercises. For example:  From a standing position, bend forward from the waist with your knees slightly bent. Let your arms dangle close to the floor.  Breathe in slowly and deeply as you return to a standing position. Roll up slowly and lift your head last.  Hold your breath for just a few seconds in the standing position.  Breathe out slowly and bend forward from the waist.  Let your feelings out. Talk, laugh, cry, and express anger when you need to. Talking with supportive friends or family, a counselor, or a levy leader about your feelings is a healthy way to relieve stress. Avoid discussing your feelings with people who make you feel worse.  Write. It may help to write about things that are bothering you. This helps you find out how much stress you feel and what is causing it. When you know this, you can find better ways to cope.  What can you do to prevent stress?  You might try some of these things to help prevent stress:  Manage your time. This helps you find time to do the things you want and need to do.  Get enough sleep. Your body recovers from the stresses of the day while you are sleeping.  Get support. Your family, friends, and community can make a difference in how you experience stress.  Limit your news feed. Avoid or limit time on social media or news that may make you feel stressed.  Do something active. Exercise or activity can help reduce stress. Walking is a great way to get started.  Where can you learn more?  Go to https://www.[employer].net/patiented  Enter N032 in the search box to learn more about \"Learning About Stress.\"  Current as of: October 24, 2023  Content Version: 14.2 2024 [employer]. "   Care instructions adapted under license by your healthcare professional. If you have questions about a medical condition or this instruction, always ask your healthcare professional. [employer] disclaims any warranty or liability for your use of this information.

## 2024-12-18 NOTE — RESULT ENCOUNTER NOTE
Fidelina Wylie    Your results look like maybe we should consider starting medication for your thyroid again. I would recommend we give it 6 weeks and check again. If you are still low, then we can start the medication.     The results are attached for your review.       Darrell Reagan PA-C

## 2025-03-05 ENCOUNTER — TELEPHONE (OUTPATIENT)
Dept: FAMILY MEDICINE | Facility: OTHER | Age: 40
End: 2025-03-05
Payer: COMMERCIAL

## 2025-03-05 DIAGNOSIS — E03.9 ACQUIRED HYPOTHYROIDISM: Primary | ICD-10-CM

## 2025-03-05 NOTE — TELEPHONE ENCOUNTER
"Per results from 12/17 for thyroid: \"Your results look like maybe we should consider starting medication for your thyroid again. I would recommend we give it 6 weeks and check again. If you are still low, then we can start the medication.\"    Would you like to order labs or have a visit?    ALY ChaviraN, RN     "

## 2025-03-05 NOTE — TELEPHONE ENCOUNTER
Order/Referral Request    Who is requesting: pt    Orders being requested: labs for thyroid    Reason service is needed/diagnosis: pt was at [facility] pediatric appt and provider noticed that her thyroid on left side looked enlarged and advised pt go get labs done    When are orders needed by: 3/10    Has this been discussed with Provider: No    Does patient have a preference on a Group/Provider/Facility? [facility]    Does patient have an appointment scheduled?: Yes: 3/10 @ 3pm     Where to send orders: Place orders within Epic    Could we send this information to you in [employer] or would you prefer to receive a phone call?:   Patient would like to be contacted via [employer]

## 2025-03-07 ENCOUNTER — MYC MEDICAL ADVICE (OUTPATIENT)
Dept: FAMILY MEDICINE | Facility: OTHER | Age: 40
End: 2025-03-07
Payer: COMMERCIAL

## 2025-03-07 DIAGNOSIS — E03.9 ACQUIRED HYPOTHYROIDISM: Primary | ICD-10-CM

## 2025-03-10 ENCOUNTER — LAB (OUTPATIENT)
Dept: LAB | Facility: OTHER | Age: 40
End: 2025-03-10
Payer: COMMERCIAL

## 2025-03-10 DIAGNOSIS — E03.9 ACQUIRED HYPOTHYROIDISM: ICD-10-CM

## 2025-03-10 LAB — TSH SERPL DL<=0.005 MIU/L-ACNC: 3.56 UIU/ML (ref 0.3–4.2)

## 2025-03-10 PROCEDURE — 84443 ASSAY THYROID STIM HORMONE: CPT

## 2025-03-10 PROCEDURE — 36415 COLL VENOUS BLD VENIPUNCTURE: CPT

## 2025-03-11 ENCOUNTER — ANCILLARY PROCEDURE (OUTPATIENT)
Dept: ULTRASOUND IMAGING | Facility: CLINIC | Age: 40
End: 2025-03-11
Attending: PHYSICIAN ASSISTANT
Payer: COMMERCIAL

## 2025-03-11 DIAGNOSIS — E03.9 ACQUIRED HYPOTHYROIDISM: ICD-10-CM

## 2025-03-11 PROCEDURE — 76536 US EXAM OF HEAD AND NECK: CPT | Performed by: RADIOLOGY

## 2025-03-11 NOTE — RESULT ENCOUNTER NOTE
Fidelina Wylie    Your results were normal. TSH back in normal range.     The results are attached for your review.       Darrell Reagan PA-C

## 2025-03-12 RX ORDER — LEVOTHYROXINE SODIUM 25 UG/1
25 TABLET ORAL DAILY
Qty: 90 TABLET | Refills: 0 | Status: SHIPPED | OUTPATIENT
Start: 2025-03-12

## 2025-03-12 NOTE — TELEPHONE ENCOUNTER
Ultrasound is completely normal. I don't have that she is currently on any medication.     Please clarify with patient. If needs medication (though lab normal), then really needs a visit to discuss.    Darrell Reagan PA-C  [facility]

## 2025-03-12 NOTE — TELEPHONE ENCOUNTER
RN Triage    Patient Contact    Attempt # 1    RN did attempt to reach patient . No answer. Message left for patient  to call the clinic back and ask to speak to a member of the care team. Wanting to review with patient message below.       Sybil Pope RN on 3/12/2025 at 2:57 PM

## 2025-03-12 NOTE — RESULT ENCOUNTER NOTE
Fidelina Wylie    Your results were normal.     The results are attached for your review.       Darrell Reagan PA-C

## 2025-03-12 NOTE — TELEPHONE ENCOUNTER
Patient calling back. She notes that she has been dealing with issues the last 15-20 years; but patient is confused as she feels she has been put on and taken off of thyroid meds for years and she's confused why.     She has been taking 1 tablet of 25mcg levothyroxine the past few months, but notes she is unsure it even helps with symptoms and just takes it to keep numbers good. This was last prescribed by OB. She is very curious if continuing symptoms would be resolved some but just getting on a medication an staying on it.     RN advised patient that a visit will be best and patient agreed. She is going to get on MyChart and schedule something in the next few weeks once she is back home.     Patient is wondering if PCP can refill medication until she sees her as she only has a few left. However, patient said she could go without if PCP thinks that is needed until she sees her too.     Inessa Hitchcock, ALYN, RN

## 2025-03-13 NOTE — TELEPHONE ENCOUNTER
Called and relayed that refill was sent and patient should schedule follow up with PCP. She is agreeable to this and [facility]     Noa Gaines, RN, BSN

## 2025-04-21 ENCOUNTER — OFFICE VISIT (OUTPATIENT)
Dept: FAMILY MEDICINE | Facility: OTHER | Age: 40
End: 2025-04-21
Payer: COMMERCIAL

## 2025-04-21 VITALS
OXYGEN SATURATION: 97 % | HEIGHT: 64 IN | TEMPERATURE: 98.1 F | WEIGHT: 176.5 LBS | DIASTOLIC BLOOD PRESSURE: 64 MMHG | BODY MASS INDEX: 30.13 KG/M2 | HEART RATE: 82 BPM | RESPIRATION RATE: 20 BRPM | SYSTOLIC BLOOD PRESSURE: 104 MMHG

## 2025-04-21 DIAGNOSIS — F90.0 ATTENTION DEFICIT HYPERACTIVITY DISORDER (ADHD), PREDOMINANTLY INATTENTIVE TYPE: Primary | ICD-10-CM

## 2025-04-21 DIAGNOSIS — F41.1 GAD (GENERALIZED ANXIETY DISORDER): ICD-10-CM

## 2025-04-21 DIAGNOSIS — E03.9 ACQUIRED HYPOTHYROIDISM: ICD-10-CM

## 2025-04-21 LAB — TSH SERPL DL<=0.005 MIU/L-ACNC: 1.75 UIU/ML (ref 0.3–4.2)

## 2025-04-21 PROCEDURE — 3078F DIAST BP <80 MM HG: CPT | Performed by: PHYSICIAN ASSISTANT

## 2025-04-21 PROCEDURE — 1126F AMNT PAIN NOTED NONE PRSNT: CPT | Performed by: PHYSICIAN ASSISTANT

## 2025-04-21 PROCEDURE — 36415 COLL VENOUS BLD VENIPUNCTURE: CPT | Performed by: PHYSICIAN ASSISTANT

## 2025-04-21 PROCEDURE — 99214 OFFICE O/P EST MOD 30 MIN: CPT | Performed by: PHYSICIAN ASSISTANT

## 2025-04-21 PROCEDURE — 3074F SYST BP LT 130 MM HG: CPT | Performed by: PHYSICIAN ASSISTANT

## 2025-04-21 PROCEDURE — 84443 ASSAY THYROID STIM HORMONE: CPT | Performed by: PHYSICIAN ASSISTANT

## 2025-04-21 PROCEDURE — G2211 COMPLEX E/M VISIT ADD ON: HCPCS | Performed by: PHYSICIAN ASSISTANT

## 2025-04-21 RX ORDER — LEVOTHYROXINE SODIUM 25 UG/1
25 TABLET ORAL DAILY
Qty: 90 TABLET | Refills: 3 | Status: SHIPPED | OUTPATIENT
Start: 2025-04-21

## 2025-04-21 RX ORDER — LISDEXAMFETAMINE DIMESYLATE 30 MG/1
30 CAPSULE ORAL EVERY MORNING
Qty: 30 CAPSULE | Refills: 0 | Status: SHIPPED | OUTPATIENT
Start: 2025-04-21

## 2025-04-21 ASSESSMENT — ANXIETY QUESTIONNAIRES
5. BEING SO RESTLESS THAT IT IS HARD TO SIT STILL: NOT AT ALL
2. NOT BEING ABLE TO STOP OR CONTROL WORRYING: SEVERAL DAYS
7. FEELING AFRAID AS IF SOMETHING AWFUL MIGHT HAPPEN: SEVERAL DAYS
7. FEELING AFRAID AS IF SOMETHING AWFUL MIGHT HAPPEN: SEVERAL DAYS
6. BECOMING EASILY ANNOYED OR IRRITABLE: SEVERAL DAYS
3. WORRYING TOO MUCH ABOUT DIFFERENT THINGS: SEVERAL DAYS
IF YOU CHECKED OFF ANY PROBLEMS ON THIS QUESTIONNAIRE, HOW DIFFICULT HAVE THESE PROBLEMS MADE IT FOR YOU TO DO YOUR WORK, TAKE CARE OF THINGS AT HOME, OR GET ALONG WITH OTHER PEOPLE: SOMEWHAT DIFFICULT
GAD7 TOTAL SCORE: 5
GAD7 TOTAL SCORE: 5
4. TROUBLE RELAXING: NOT AT ALL
1. FEELING NERVOUS, ANXIOUS, OR ON EDGE: SEVERAL DAYS
8. IF YOU CHECKED OFF ANY PROBLEMS, HOW DIFFICULT HAVE THESE MADE IT FOR YOU TO DO YOUR WORK, TAKE CARE OF THINGS AT HOME, OR GET ALONG WITH OTHER PEOPLE?: SOMEWHAT DIFFICULT
GAD7 TOTAL SCORE: 5

## 2025-04-21 ASSESSMENT — PAIN SCALES - GENERAL: PAINLEVEL_OUTOF10: NO PAIN (0)

## 2025-04-21 NOTE — PROGRESS NOTES
Assessment & Plan     ICD-10-CM    1. Attention deficit hyperactivity disorder (ADHD), predominantly inattentive type - diagnosed in college  F90.0 lisdexamfetamine (VYVANSE) 30 MG capsule      2. Acquired hypothyroidism  E03.9 levothyroxine (SYNTHROID/LEVOTHROID) 25 MCG tablet     TSH with free T4 reflex     TSH with free T4 reflex        1. ADHD   - On Vyanse 30 mg, going well, stable   - CSA 12/17/24   - Utox 12/17/24, no concerns   -  reviewed, no concerns, patient often doesn't take on weekends   - Recheck every 3 months     2. Thyroid   - Going to get established with endo in a few months   - Was on medications then not for awhile, recently restarted Levothyroxine 25 mcg in December   - Recommend lab update   - Results will be communicated to patient when available and appropriate medication changes made if necessary   - Reviewed medication use and side effects, refilled     3. JOEL  - Taking Viibryd and having good control of her depression, anxiety very situational  - Recommend counseling for this       The patient indicates understanding of these issues and agrees with the plan.    Follow up:    Darrell Lee-GORDON Machado  [facility]   Inessa is a 39 year old, presenting for the following health issues:  Thyroid Problem      4/21/2025     1:11 PM   Additional Questions   Roomed by Nya   Accompanied by Self         4/21/2025     1:11 PM   Patient Reported Additional Medications   Patient reports taking the following new medications Semiglutide     History of Present Illness       Hypothyroidism:     Since last visit, patient describes the following symptoms::  Anxiety and Fatigue    She eats 2-3 servings of fruits and vegetables daily.She consumes 1 sweetened beverage(s) daily.She exercises with enough effort to increase her heart rate 20 to 29 minutes per day.  She exercises with enough effort to increase her heart rate 4 days per week.   She is taking  medications regularly.          Hypothyroidism Follow-up    Since last visit, patient describes the following symptoms: anxiety and fatigue    - Endo consult in June       Inessa is here today to recheck ADHD/ADD.    Updates since last visit: Doesn't take when doesn't need to, can't take with allergy medications     Routine for taking medicine, including time: 10 am   Time medicine wears off: 4-5 pm   Issues at school: None   Issues at home: None   Control of symptoms: Good     Side effects:  Headaches: No  Stomach aches: No  Irritability/mood swings: No  Difficulties with sleep: Yes - unrelated, small child   Social withdrawal: No  Unusual movements/tics: No  Decreased appetite: No    Other concerns: None     - Increased anxiety since the birth of her child, Vlad, with specific worries about her other child, Sarah.  - New fatigue, waking up multiple times during the night, difficulty falling asleep.  - Vyvanse taken inconsistently due to counter effects and allergies, leading to feeling weird if takes while on antihistamines  - Allergy symptoms persistent for 2 weeks, affecting sleep and productivity.  - Started taking Semaglutide 4 months ago, resulting in weight loss of approximately 17 lbs.  - Thyroid issues with fluctuating levels over the past 15 years, currently on levothyroxine since December.  - Previous CT scan and normal thyroid tests, but neck appears swollen.        9/25/2024     7:48 PM 12/16/2024    10:02 PM 4/21/2025    12:05 PM   PHQ   PHQ-9 Total Score 3 3  5    Q9: Thoughts of better off dead/self-harm past 2 weeks Not at all Not at all Not at all       Patient-reported         11/14/2024    10:24 AM 12/16/2024    10:03 PM 4/21/2025    12:05 PM   JOEL-7 SCORE   Total Score 7 (mild anxiety) 8 (mild anxiety) 5 (mild anxiety)   Total Score 7  8  5        Patient-reported             Review of Systems  Constitutional, neuro, ENT, endocrine, pulmonary, cardiac, gastrointestinal, genitourinary,  "musculoskeletal, integument and psychiatric systems are negative, except as otherwise noted.      Objective    /64   Pulse 82   Temp 98.1  F (36.7  C) (Skin)   Resp 20   Ht 1.615 m (5' 3.58\")   Wt 80.1 kg (176 lb 8 oz)   LMP 04/11/2025 (Exact Date)   SpO2 97%   Breastfeeding No   BMI 30.69 kg/m    Body mass index is 30.69 kg/m .  Physical Exam   GENERAL APPEARANCE: healthy, alert and no distress  EYES: Eyes grossly normal to inspection, PERRLA, conjunctivae and sclerae without injection or discharge, EOM intact   RESP: Lungs clear to auscultation - no rales, rhonchi or wheezes    NECK: Regular appearance, thyroid feels slightly enlarged throughout without nodules   CV: Regular rates and rhythm, normal S1 S2, no S3 or S4, no murmur, click or rub, no peripheral edema and peripheral pulses strong and symmetric bilaterally   MS: No musculoskeletal defects are noted and gait is age appropriate without ataxia   SKIN: No suspicious lesions or rashes, hydration status appears adeuqate with normal skin turgor   PSYCH: Alert and oriented x3; speech- coherent , normal rate and volume; able to articulate logical thoughts, able to abstract reason, no tangential thoughts, no hallucinations or delusions, mentation appears normal, Mood is euthymic. Affect is appropriate for this mood state and bright. Thought content is free of suicidal ideation, hallucinations, and delusions. Dress is adequate and upkept. Eye contact is good during conversation.       Diagnostics: reviewed in Epic, see orders pending in Epic         Signed Electronically by: Sadia Reagan PA-C    "

## 2025-04-22 NOTE — RESULT ENCOUNTER NOTE
Fidelina Wylie    Your results were normal. TSH in a much better range. No change to medication at this time.     The results are attached for your review.       Darrell Reagan PA-C

## 2025-07-12 ENCOUNTER — MYC REFILL (OUTPATIENT)
Dept: FAMILY MEDICINE | Facility: OTHER | Age: 40
End: 2025-07-12
Payer: COMMERCIAL

## 2025-07-12 DIAGNOSIS — F90.0 ATTENTION DEFICIT HYPERACTIVITY DISORDER (ADHD), PREDOMINANTLY INATTENTIVE TYPE: ICD-10-CM

## 2025-07-14 RX ORDER — LISDEXAMFETAMINE DIMESYLATE 30 MG/1
30 CAPSULE ORAL EVERY MORNING
Qty: 30 CAPSULE | Refills: 0 | Status: SHIPPED | OUTPATIENT
Start: 2025-07-14

## 2025-07-14 NOTE — TELEPHONE ENCOUNTER
reviewed. Due for routine fill. E-prescribed.     Darrell Reagan PA-C  [facility]

## (undated) DEVICE — TUBING SUCTION 12"X1/4" N612

## (undated) DEVICE — LUBRICATING JELLY 4.25OZ

## (undated) DEVICE — KIT ENDO TURNOVER/PROCEDURE CARRY-ON 101822